# Patient Record
Sex: MALE | Race: WHITE | Employment: OTHER | URBAN - NONMETROPOLITAN AREA
[De-identification: names, ages, dates, MRNs, and addresses within clinical notes are randomized per-mention and may not be internally consistent; named-entity substitution may affect disease eponyms.]

---

## 2017-01-17 ENCOUNTER — OFFICE VISIT (OUTPATIENT)
Dept: FAMILY MEDICINE CLINIC | Age: 67
End: 2017-01-17

## 2017-01-17 VITALS
TEMPERATURE: 98 F | RESPIRATION RATE: 14 BRPM | WEIGHT: 193.2 LBS | DIASTOLIC BLOOD PRESSURE: 86 MMHG | HEART RATE: 60 BPM | BODY MASS INDEX: 28.62 KG/M2 | SYSTOLIC BLOOD PRESSURE: 150 MMHG

## 2017-01-17 DIAGNOSIS — M79.10 MYALGIA: Primary | ICD-10-CM

## 2017-01-17 DIAGNOSIS — B97.89 VIRAL SINUSITIS: ICD-10-CM

## 2017-01-17 DIAGNOSIS — D50.9 IRON DEFICIENCY ANEMIA, UNSPECIFIED IRON DEFICIENCY ANEMIA TYPE: ICD-10-CM

## 2017-01-17 DIAGNOSIS — J32.9 VIRAL SINUSITIS: ICD-10-CM

## 2017-01-17 DIAGNOSIS — M54.9 MID BACK PAIN: ICD-10-CM

## 2017-01-17 DIAGNOSIS — R53.83 OTHER FATIGUE: ICD-10-CM

## 2017-01-17 PROCEDURE — 93000 ELECTROCARDIOGRAM COMPLETE: CPT | Performed by: FAMILY MEDICINE

## 2017-01-17 PROCEDURE — G8427 DOCREV CUR MEDS BY ELIG CLIN: HCPCS | Performed by: FAMILY MEDICINE

## 2017-01-17 PROCEDURE — G8484 FLU IMMUNIZE NO ADMIN: HCPCS | Performed by: FAMILY MEDICINE

## 2017-01-17 PROCEDURE — 1036F TOBACCO NON-USER: CPT | Performed by: FAMILY MEDICINE

## 2017-01-17 PROCEDURE — G8420 CALC BMI NORM PARAMETERS: HCPCS | Performed by: FAMILY MEDICINE

## 2017-01-17 PROCEDURE — 1123F ACP DISCUSS/DSCN MKR DOCD: CPT | Performed by: FAMILY MEDICINE

## 2017-01-17 PROCEDURE — 99214 OFFICE O/P EST MOD 30 MIN: CPT | Performed by: FAMILY MEDICINE

## 2017-01-17 PROCEDURE — 4040F PNEUMOC VAC/ADMIN/RCVD: CPT | Performed by: FAMILY MEDICINE

## 2017-01-17 PROCEDURE — 3017F COLORECTAL CA SCREEN DOC REV: CPT | Performed by: FAMILY MEDICINE

## 2017-01-17 ASSESSMENT — PATIENT HEALTH QUESTIONNAIRE - PHQ9
SUM OF ALL RESPONSES TO PHQ9 QUESTIONS 1 & 2: 0
SUM OF ALL RESPONSES TO PHQ QUESTIONS 1-9: 0
1. LITTLE INTEREST OR PLEASURE IN DOING THINGS: 0
2. FEELING DOWN, DEPRESSED OR HOPELESS: 0

## 2017-01-19 ENCOUNTER — TELEPHONE (OUTPATIENT)
Dept: FAMILY MEDICINE CLINIC | Age: 67
End: 2017-01-19

## 2017-01-19 DIAGNOSIS — R19.5 HEME POSITIVE STOOL: Primary | ICD-10-CM

## 2017-01-19 DIAGNOSIS — D50.9 IRON DEFICIENCY ANEMIA, UNSPECIFIED IRON DEFICIENCY ANEMIA TYPE: ICD-10-CM

## 2017-01-19 ASSESSMENT — ENCOUNTER SYMPTOMS
ABDOMINAL PAIN: 0
NAUSEA: 0
SORE THROAT: 1
VOMITING: 0
BACK PAIN: 1
DIARRHEA: 0
RHINORRHEA: 1
SHORTNESS OF BREATH: 0
CONSTIPATION: 0
BLOOD IN STOOL: 0
STRIDOR: 0
WHEEZING: 0
COUGH: 0

## 2017-01-23 ENCOUNTER — OFFICE VISIT (OUTPATIENT)
Dept: FAMILY MEDICINE CLINIC | Age: 67
End: 2017-01-23

## 2017-01-23 VITALS
RESPIRATION RATE: 17 BRPM | BODY MASS INDEX: 28.23 KG/M2 | HEIGHT: 69 IN | OXYGEN SATURATION: 94 % | DIASTOLIC BLOOD PRESSURE: 72 MMHG | WEIGHT: 190.6 LBS | HEART RATE: 64 BPM | SYSTOLIC BLOOD PRESSURE: 129 MMHG

## 2017-01-23 DIAGNOSIS — D50.9 IRON DEFICIENCY ANEMIA, UNSPECIFIED IRON DEFICIENCY ANEMIA TYPE: Primary | ICD-10-CM

## 2017-01-23 DIAGNOSIS — R19.5 HEME + STOOL: ICD-10-CM

## 2017-01-23 DIAGNOSIS — E83.52 HYPERCALCEMIA: ICD-10-CM

## 2017-01-23 PROCEDURE — 1123F ACP DISCUSS/DSCN MKR DOCD: CPT | Performed by: FAMILY MEDICINE

## 2017-01-23 PROCEDURE — 3045F PR MOST RECENT HEMOGLOBIN A1C LEVEL 7.0-9.0%: CPT | Performed by: FAMILY MEDICINE

## 2017-01-23 PROCEDURE — 99214 OFFICE O/P EST MOD 30 MIN: CPT | Performed by: FAMILY MEDICINE

## 2017-01-23 PROCEDURE — G8484 FLU IMMUNIZE NO ADMIN: HCPCS | Performed by: FAMILY MEDICINE

## 2017-01-23 PROCEDURE — 1036F TOBACCO NON-USER: CPT | Performed by: FAMILY MEDICINE

## 2017-01-23 PROCEDURE — 3017F COLORECTAL CA SCREEN DOC REV: CPT | Performed by: FAMILY MEDICINE

## 2017-01-23 PROCEDURE — G8420 CALC BMI NORM PARAMETERS: HCPCS | Performed by: FAMILY MEDICINE

## 2017-01-23 PROCEDURE — G8427 DOCREV CUR MEDS BY ELIG CLIN: HCPCS | Performed by: FAMILY MEDICINE

## 2017-01-23 PROCEDURE — 4040F PNEUMOC VAC/ADMIN/RCVD: CPT | Performed by: FAMILY MEDICINE

## 2017-01-26 ENCOUNTER — TELEPHONE (OUTPATIENT)
Dept: FAMILY MEDICINE CLINIC | Age: 67
End: 2017-01-26

## 2017-01-26 RX ORDER — GLIMEPIRIDE 2 MG/1
2 TABLET ORAL EVERY MORNING
Qty: 30 TABLET | Refills: 3 | Status: SHIPPED | OUTPATIENT
Start: 2017-01-26 | End: 2017-01-27 | Stop reason: SDUPTHER

## 2017-01-26 ASSESSMENT — ENCOUNTER SYMPTOMS
ABDOMINAL PAIN: 0
STRIDOR: 0
SHORTNESS OF BREATH: 0
NAUSEA: 0
COUGH: 0
WHEEZING: 0
DIARRHEA: 0
CONSTIPATION: 0
BLOOD IN STOOL: 0
VOMITING: 0

## 2017-01-27 RX ORDER — GLIMEPIRIDE 2 MG/1
2 TABLET ORAL 2 TIMES DAILY
Qty: 180 TABLET | Refills: 2 | Status: SHIPPED | OUTPATIENT
Start: 2017-01-27 | End: 2017-10-31 | Stop reason: SDUPTHER

## 2017-02-02 ENCOUNTER — OFFICE VISIT (OUTPATIENT)
Dept: FAMILY MEDICINE CLINIC | Age: 67
End: 2017-02-02

## 2017-02-02 VITALS
HEART RATE: 80 BPM | SYSTOLIC BLOOD PRESSURE: 114 MMHG | OXYGEN SATURATION: 96 % | WEIGHT: 190 LBS | BODY MASS INDEX: 28.14 KG/M2 | DIASTOLIC BLOOD PRESSURE: 56 MMHG | RESPIRATION RATE: 8 BRPM | TEMPERATURE: 97.6 F

## 2017-02-02 DIAGNOSIS — J01.10 ACUTE NON-RECURRENT FRONTAL SINUSITIS: ICD-10-CM

## 2017-02-02 DIAGNOSIS — J02.0 STREP THROAT: Primary | ICD-10-CM

## 2017-02-02 LAB — S PYO AG THROAT QL: POSITIVE

## 2017-02-02 PROCEDURE — 1036F TOBACCO NON-USER: CPT | Performed by: FAMILY MEDICINE

## 2017-02-02 PROCEDURE — 1123F ACP DISCUSS/DSCN MKR DOCD: CPT | Performed by: FAMILY MEDICINE

## 2017-02-02 PROCEDURE — 4040F PNEUMOC VAC/ADMIN/RCVD: CPT | Performed by: FAMILY MEDICINE

## 2017-02-02 PROCEDURE — 87880 STREP A ASSAY W/OPTIC: CPT | Performed by: FAMILY MEDICINE

## 2017-02-02 PROCEDURE — 99213 OFFICE O/P EST LOW 20 MIN: CPT | Performed by: FAMILY MEDICINE

## 2017-02-02 PROCEDURE — G8484 FLU IMMUNIZE NO ADMIN: HCPCS | Performed by: FAMILY MEDICINE

## 2017-02-02 PROCEDURE — 3017F COLORECTAL CA SCREEN DOC REV: CPT | Performed by: FAMILY MEDICINE

## 2017-02-02 PROCEDURE — G8427 DOCREV CUR MEDS BY ELIG CLIN: HCPCS | Performed by: FAMILY MEDICINE

## 2017-02-02 PROCEDURE — G8420 CALC BMI NORM PARAMETERS: HCPCS | Performed by: FAMILY MEDICINE

## 2017-02-02 RX ORDER — AMOXICILLIN AND CLAVULANATE POTASSIUM 875; 125 MG/1; MG/1
1 TABLET, FILM COATED ORAL 2 TIMES DAILY
Qty: 20 TABLET | Refills: 0 | Status: SHIPPED | OUTPATIENT
Start: 2017-02-02 | End: 2017-02-12

## 2017-02-02 ASSESSMENT — ENCOUNTER SYMPTOMS
NAUSEA: 0
SORE THROAT: 1
DIARRHEA: 0
STRIDOR: 0
BLOOD IN STOOL: 0
VOMITING: 0
ABDOMINAL PAIN: 0
SHORTNESS OF BREATH: 0
WHEEZING: 0
RHINORRHEA: 1
SINUS PRESSURE: 1
CONSTIPATION: 0
COUGH: 1

## 2017-02-23 ENCOUNTER — TELEPHONE (OUTPATIENT)
Dept: FAMILY MEDICINE CLINIC | Age: 67
End: 2017-02-23

## 2017-04-03 ENCOUNTER — OFFICE VISIT (OUTPATIENT)
Dept: ENDOCRINOLOGY | Age: 67
End: 2017-04-03

## 2017-04-03 VITALS
RESPIRATION RATE: 16 BRPM | WEIGHT: 194 LBS | SYSTOLIC BLOOD PRESSURE: 136 MMHG | HEART RATE: 67 BPM | BODY MASS INDEX: 28.73 KG/M2 | DIASTOLIC BLOOD PRESSURE: 76 MMHG | HEIGHT: 69 IN

## 2017-04-03 DIAGNOSIS — I10 ESSENTIAL HYPERTENSION: ICD-10-CM

## 2017-04-03 DIAGNOSIS — E83.52 HYPERCALCEMIA: ICD-10-CM

## 2017-04-03 DIAGNOSIS — E78.00 PURE HYPERCHOLESTEROLEMIA: ICD-10-CM

## 2017-04-03 PROCEDURE — 1123F ACP DISCUSS/DSCN MKR DOCD: CPT | Performed by: INTERNAL MEDICINE

## 2017-04-03 PROCEDURE — 4040F PNEUMOC VAC/ADMIN/RCVD: CPT | Performed by: INTERNAL MEDICINE

## 2017-04-03 PROCEDURE — G8420 CALC BMI NORM PARAMETERS: HCPCS | Performed by: INTERNAL MEDICINE

## 2017-04-03 PROCEDURE — 3017F COLORECTAL CA SCREEN DOC REV: CPT | Performed by: INTERNAL MEDICINE

## 2017-04-03 PROCEDURE — 3045F PR MOST RECENT HEMOGLOBIN A1C LEVEL 7.0-9.0%: CPT | Performed by: INTERNAL MEDICINE

## 2017-04-03 PROCEDURE — G8427 DOCREV CUR MEDS BY ELIG CLIN: HCPCS | Performed by: INTERNAL MEDICINE

## 2017-04-03 PROCEDURE — 1036F TOBACCO NON-USER: CPT | Performed by: INTERNAL MEDICINE

## 2017-04-03 PROCEDURE — 99214 OFFICE O/P EST MOD 30 MIN: CPT | Performed by: INTERNAL MEDICINE

## 2017-04-03 RX ORDER — CARVEDILOL 12.5 MG/1
12.5 TABLET ORAL 2 TIMES DAILY
Qty: 180 TABLET | Refills: 2 | Status: SHIPPED | OUTPATIENT
Start: 2017-04-03 | End: 2017-12-04

## 2017-04-03 RX ORDER — RANITIDINE 150 MG/1
150 CAPSULE ORAL EVERY EVENING
COMMUNITY
End: 2019-10-31 | Stop reason: SDUPTHER

## 2017-04-24 ENCOUNTER — OFFICE VISIT (OUTPATIENT)
Dept: FAMILY MEDICINE CLINIC | Age: 67
End: 2017-04-24

## 2017-04-24 VITALS
DIASTOLIC BLOOD PRESSURE: 58 MMHG | SYSTOLIC BLOOD PRESSURE: 122 MMHG | HEIGHT: 69 IN | RESPIRATION RATE: 16 BRPM | WEIGHT: 196.8 LBS | HEART RATE: 64 BPM | BODY MASS INDEX: 29.15 KG/M2

## 2017-04-24 DIAGNOSIS — Z99.89 OBSTRUCTIVE SLEEP APNEA ON CPAP: ICD-10-CM

## 2017-04-24 DIAGNOSIS — I10 ESSENTIAL HYPERTENSION: ICD-10-CM

## 2017-04-24 DIAGNOSIS — D64.9 NORMOCYTIC ANEMIA: Primary | ICD-10-CM

## 2017-04-24 DIAGNOSIS — E78.00 PURE HYPERCHOLESTEROLEMIA: ICD-10-CM

## 2017-04-24 DIAGNOSIS — L82.1 SEBORRHEIC KERATOSES: ICD-10-CM

## 2017-04-24 DIAGNOSIS — R60.0 BILATERAL LEG EDEMA: ICD-10-CM

## 2017-04-24 DIAGNOSIS — G47.33 OBSTRUCTIVE SLEEP APNEA ON CPAP: ICD-10-CM

## 2017-04-24 PROCEDURE — 1036F TOBACCO NON-USER: CPT | Performed by: FAMILY MEDICINE

## 2017-04-24 PROCEDURE — 3017F COLORECTAL CA SCREEN DOC REV: CPT | Performed by: FAMILY MEDICINE

## 2017-04-24 PROCEDURE — 4040F PNEUMOC VAC/ADMIN/RCVD: CPT | Performed by: FAMILY MEDICINE

## 2017-04-24 PROCEDURE — G8420 CALC BMI NORM PARAMETERS: HCPCS | Performed by: FAMILY MEDICINE

## 2017-04-24 PROCEDURE — 1123F ACP DISCUSS/DSCN MKR DOCD: CPT | Performed by: FAMILY MEDICINE

## 2017-04-24 PROCEDURE — G8427 DOCREV CUR MEDS BY ELIG CLIN: HCPCS | Performed by: FAMILY MEDICINE

## 2017-04-24 PROCEDURE — 99214 OFFICE O/P EST MOD 30 MIN: CPT | Performed by: FAMILY MEDICINE

## 2017-04-24 ASSESSMENT — ENCOUNTER SYMPTOMS
ABDOMINAL PAIN: 0
STRIDOR: 0
BLOOD IN STOOL: 0
WHEEZING: 0
COUGH: 0
DIARRHEA: 0
SHORTNESS OF BREATH: 0
VOMITING: 0
CONSTIPATION: 0
NAUSEA: 0

## 2017-08-14 ENCOUNTER — HOSPITAL ENCOUNTER (OUTPATIENT)
Age: 67
Discharge: HOME | End: 2017-08-14
Payer: MEDICARE

## 2017-08-14 ENCOUNTER — OFFICE VISIT (OUTPATIENT)
Dept: ENDOCRINOLOGY | Age: 67
End: 2017-08-14
Payer: MEDICARE

## 2017-08-14 VITALS
DIASTOLIC BLOOD PRESSURE: 72 MMHG | RESPIRATION RATE: 16 BRPM | HEART RATE: 63 BPM | SYSTOLIC BLOOD PRESSURE: 138 MMHG | HEIGHT: 69 IN | WEIGHT: 194.5 LBS | BODY MASS INDEX: 28.81 KG/M2

## 2017-08-14 DIAGNOSIS — E21.3 HYPERPARATHYROIDISM (HCC): ICD-10-CM

## 2017-08-14 DIAGNOSIS — I10 ESSENTIAL HYPERTENSION: ICD-10-CM

## 2017-08-14 DIAGNOSIS — E78.00 PURE HYPERCHOLESTEROLEMIA: ICD-10-CM

## 2017-08-14 LAB
ALBUMIN SERPL-MCNC: 4.2 G/DL (ref 3.5–5.1)
ALP BLD-CCNC: 67 U/L (ref 38–126)
ALT SERPL-CCNC: 18 U/L (ref 11–66)
ANION GAP SERPL CALCULATED.3IONS-SCNC: 11 MEQ/L (ref 8–16)
AST SERPL-CCNC: 14 U/L (ref 5–40)
AVERAGE GLUCOSE: 153 MG/DL (ref 70–126)
BILIRUB SERPL-MCNC: 0.4 MG/DL (ref 0.3–1.2)
BUN BLDV-MCNC: 12 MG/DL (ref 7–22)
CALCIUM SERPL-MCNC: 10.9 MG/DL (ref 8.5–10.5)
CHLORIDE BLD-SCNC: 102 MEQ/L (ref 98–111)
CO2: 29 MEQ/L (ref 23–33)
CREAT SERPL-MCNC: 0.8 MG/DL (ref 0.4–1.2)
GFR SERPL CREATININE-BSD FRML MDRD: > 90 ML/MIN/1.73M2
GLUCOSE BLD-MCNC: 98 MG/DL (ref 70–108)
HBA1C MFR BLD: 7.1 % (ref 4.4–6.4)
MAGNESIUM: 1.3 MG/DL (ref 1.6–2.4)
POTASSIUM SERPL-SCNC: 4.3 MEQ/L (ref 3.5–5.2)
PTH INTACT: 55.8 PG/ML (ref 15–65)
SODIUM BLD-SCNC: 142 MEQ/L (ref 135–145)
TOTAL PROTEIN: 6.8 G/DL (ref 6.1–8)
VITAMIN D 25-HYDROXY: 20 NG/ML (ref 30–100)

## 2017-08-14 PROCEDURE — 83735 ASSAY OF MAGNESIUM: CPT

## 2017-08-14 PROCEDURE — 3017F COLORECTAL CA SCREEN DOC REV: CPT | Performed by: INTERNAL MEDICINE

## 2017-08-14 PROCEDURE — 83970 ASSAY OF PARATHORMONE: CPT

## 2017-08-14 PROCEDURE — 36415 COLL VENOUS BLD VENIPUNCTURE: CPT

## 2017-08-14 PROCEDURE — 82306 VITAMIN D 25 HYDROXY: CPT

## 2017-08-14 PROCEDURE — 83036 HEMOGLOBIN GLYCOSYLATED A1C: CPT

## 2017-08-14 PROCEDURE — 99214 OFFICE O/P EST MOD 30 MIN: CPT | Performed by: INTERNAL MEDICINE

## 2017-08-14 PROCEDURE — 1036F TOBACCO NON-USER: CPT | Performed by: INTERNAL MEDICINE

## 2017-08-14 PROCEDURE — 1123F ACP DISCUSS/DSCN MKR DOCD: CPT | Performed by: INTERNAL MEDICINE

## 2017-08-14 PROCEDURE — 3046F HEMOGLOBIN A1C LEVEL >9.0%: CPT | Performed by: INTERNAL MEDICINE

## 2017-08-14 PROCEDURE — G8419 CALC BMI OUT NRM PARAM NOF/U: HCPCS | Performed by: INTERNAL MEDICINE

## 2017-08-14 PROCEDURE — 80053 COMPREHEN METABOLIC PANEL: CPT

## 2017-08-14 PROCEDURE — 82330 ASSAY OF CALCIUM: CPT

## 2017-08-14 PROCEDURE — 4040F PNEUMOC VAC/ADMIN/RCVD: CPT | Performed by: INTERNAL MEDICINE

## 2017-08-14 PROCEDURE — G8427 DOCREV CUR MEDS BY ELIG CLIN: HCPCS | Performed by: INTERNAL MEDICINE

## 2017-08-14 PROCEDURE — 82652 VIT D 1 25-DIHYDROXY: CPT

## 2017-08-14 RX ORDER — EZETIMIBE 10 MG/1
10 TABLET ORAL DAILY
Qty: 30 TABLET | Refills: 3 | Status: SHIPPED | OUTPATIENT
Start: 2017-08-14 | End: 2018-01-25 | Stop reason: ALTCHOICE

## 2017-08-16 LAB
CALCIUM IONIZED: NORMAL
VITAMIN D 1,25-DIHYDROXY: 38.9 PG/ML (ref 19.9–79.3)

## 2017-08-24 DIAGNOSIS — N52.9 ERECTILE DYSFUNCTION, UNSPECIFIED ERECTILE DYSFUNCTION TYPE: ICD-10-CM

## 2017-08-24 RX ORDER — TADALAFIL 5 MG/1
5 TABLET ORAL PRN
Qty: 90 TABLET | Refills: 3 | Status: SHIPPED | OUTPATIENT
Start: 2017-08-24 | End: 2017-08-28 | Stop reason: SDUPTHER

## 2017-08-28 ENCOUNTER — TELEPHONE (OUTPATIENT)
Dept: FAMILY MEDICINE CLINIC | Age: 67
End: 2017-08-28

## 2017-08-28 DIAGNOSIS — N52.9 ERECTILE DYSFUNCTION, UNSPECIFIED ERECTILE DYSFUNCTION TYPE: ICD-10-CM

## 2017-08-28 RX ORDER — TADALAFIL 5 MG/1
5 TABLET ORAL PRN
Qty: 20 TABLET | Refills: 11 | Status: SHIPPED | OUTPATIENT
Start: 2017-08-28 | End: 2017-08-28 | Stop reason: SDUPTHER

## 2017-08-28 RX ORDER — TADALAFIL 5 MG/1
5 TABLET ORAL PRN
Qty: 60 TABLET | Refills: 3 | Status: SHIPPED | OUTPATIENT
Start: 2017-08-28 | End: 2017-12-04 | Stop reason: ALTCHOICE

## 2017-09-15 ENCOUNTER — HOSPITAL ENCOUNTER (OUTPATIENT)
Age: 67
Discharge: HOME | End: 2017-09-15
Payer: MEDICARE

## 2017-09-15 LAB
ALBUMIN SERPL-MCNC: 4 G/DL (ref 3.5–5.1)
ALP BLD-CCNC: 61 U/L (ref 38–126)
ALT SERPL-CCNC: 23 U/L (ref 11–66)
AST SERPL-CCNC: 16 U/L (ref 5–40)
BILIRUB SERPL-MCNC: 0.3 MG/DL (ref 0.3–1.2)
BILIRUBIN DIRECT: < 0.2 MG/DL (ref 0–0.3)
CHOLESTEROL, TOTAL: 142 MG/DL (ref 100–199)
HDLC SERPL-MCNC: 31 MG/DL
LDL CHOLESTEROL CALCULATED: 81 MG/DL
TOTAL PROTEIN: 6.4 G/DL (ref 6.1–8)
TRIGL SERPL-MCNC: 151 MG/DL (ref 0–199)

## 2017-09-15 PROCEDURE — 36415 COLL VENOUS BLD VENIPUNCTURE: CPT

## 2017-09-15 PROCEDURE — 80076 HEPATIC FUNCTION PANEL: CPT

## 2017-09-15 PROCEDURE — 80061 LIPID PANEL: CPT

## 2017-09-26 ENCOUNTER — OFFICE VISIT (OUTPATIENT)
Dept: FAMILY MEDICINE CLINIC | Age: 67
End: 2017-09-26
Payer: MEDICARE

## 2017-09-26 VITALS
TEMPERATURE: 97.6 F | HEART RATE: 86 BPM | OXYGEN SATURATION: 98 % | WEIGHT: 197.6 LBS | RESPIRATION RATE: 16 BRPM | BODY MASS INDEX: 29.27 KG/M2 | DIASTOLIC BLOOD PRESSURE: 70 MMHG | SYSTOLIC BLOOD PRESSURE: 124 MMHG

## 2017-09-26 DIAGNOSIS — I10 ESSENTIAL HYPERTENSION: ICD-10-CM

## 2017-09-26 DIAGNOSIS — H65.01 RIGHT ACUTE SEROUS OTITIS MEDIA, RECURRENCE NOT SPECIFIED: ICD-10-CM
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
## 2017-09-26 DIAGNOSIS — L57.0 ACTINIC KERATOSIS OF LEFT TEMPLE: ICD-10-CM

## 2017-09-26 DIAGNOSIS — H93.8X1 EAR FULLNESS, RIGHT: Primary | ICD-10-CM

## 2017-09-26 PROCEDURE — 99214 OFFICE O/P EST MOD 30 MIN: CPT | Performed by: FAMILY MEDICINE

## 2017-09-26 PROCEDURE — 4040F PNEUMOC VAC/ADMIN/RCVD: CPT | Performed by: FAMILY MEDICINE

## 2017-09-26 PROCEDURE — G8427 DOCREV CUR MEDS BY ELIG CLIN: HCPCS | Performed by: FAMILY MEDICINE

## 2017-09-26 PROCEDURE — G8417 CALC BMI ABV UP PARAM F/U: HCPCS | Performed by: FAMILY MEDICINE

## 2017-09-26 PROCEDURE — 1036F TOBACCO NON-USER: CPT | Performed by: FAMILY MEDICINE

## 2017-09-26 PROCEDURE — 1123F ACP DISCUSS/DSCN MKR DOCD: CPT | Performed by: FAMILY MEDICINE

## 2017-09-26 PROCEDURE — 3017F COLORECTAL CA SCREEN DOC REV: CPT | Performed by: FAMILY MEDICINE

## 2017-09-26 RX ORDER — AMOXICILLIN 875 MG/1
875 TABLET, COATED ORAL 2 TIMES DAILY
Qty: 20 TABLET | Refills: 0 | Status: SHIPPED | OUTPATIENT
Start: 2017-09-26 | End: 2017-10-06

## 2017-09-26 RX ORDER — CARVEDILOL 25 MG/1
25 TABLET ORAL 2 TIMES DAILY
Qty: 60 TABLET | Refills: 3 | Status: CANCELLED | OUTPATIENT
Start: 2017-09-26

## 2017-09-26 ASSESSMENT — ENCOUNTER SYMPTOMS
BLOOD IN STOOL: 0
WHEEZING: 0
COUGH: 0
ABDOMINAL PAIN: 0
SHORTNESS OF BREATH: 0
VOMITING: 0
CONSTIPATION: 0
NAUSEA: 0
STRIDOR: 0
SORE THROAT: 0
DIARRHEA: 0
SINUS PRESSURE: 0

## 2017-09-27 ENCOUNTER — TELEPHONE (OUTPATIENT)
Dept: ENDOCRINOLOGY | Age: 67
End: 2017-09-27

## 2017-09-27 DIAGNOSIS — E21.3 HYPERPARATHYROIDISM (HCC): Primary | ICD-10-CM

## 2017-09-27 DIAGNOSIS — E83.52 HYPERCALCEMIA: ICD-10-CM

## 2017-10-31 RX ORDER — GLIMEPIRIDE 2 MG/1
TABLET ORAL
Qty: 180 TABLET | Refills: 2 | Status: SHIPPED | OUTPATIENT
Start: 2017-10-31 | End: 2018-05-21

## 2017-11-27 ENCOUNTER — OFFICE VISIT (OUTPATIENT)
Dept: FAMILY MEDICINE CLINIC | Age: 67
End: 2017-11-27
Payer: MEDICARE

## 2017-11-27 VITALS
DIASTOLIC BLOOD PRESSURE: 84 MMHG | RESPIRATION RATE: 8 BRPM | BODY MASS INDEX: 28.97 KG/M2 | WEIGHT: 195.6 LBS | HEART RATE: 84 BPM | SYSTOLIC BLOOD PRESSURE: 148 MMHG

## 2017-11-27 DIAGNOSIS — N52.1 ERECTILE DYSFUNCTION DUE TO DISEASES CLASSIFIED ELSEWHERE: ICD-10-CM

## 2017-11-27 DIAGNOSIS — I10 ESSENTIAL HYPERTENSION: ICD-10-CM

## 2017-11-27 DIAGNOSIS — Z13.6 SCREENING FOR AAA (ABDOMINAL AORTIC ANEURYSM): ICD-10-CM

## 2017-11-27 DIAGNOSIS — E78.00 PURE HYPERCHOLESTEROLEMIA: ICD-10-CM

## 2017-11-27 LAB
HBA1C MFR BLD: 7.2 % (ref 4.3–5.7)
MICROALB/CREAT RATIO POC: < 30 MG/G
MICROALBUMIN/CREAT UR-RTO: 10 MG/L
POC CREATININE: 50 MG/DL

## 2017-11-27 PROCEDURE — G8427 DOCREV CUR MEDS BY ELIG CLIN: HCPCS | Performed by: FAMILY MEDICINE

## 2017-11-27 PROCEDURE — 1036F TOBACCO NON-USER: CPT | Performed by: FAMILY MEDICINE

## 2017-11-27 PROCEDURE — 3045F PR MOST RECENT HEMOGLOBIN A1C LEVEL 7.0-9.0%: CPT | Performed by: FAMILY MEDICINE

## 2017-11-27 PROCEDURE — 83036 HEMOGLOBIN GLYCOSYLATED A1C: CPT | Performed by: FAMILY MEDICINE

## 2017-11-27 PROCEDURE — 4040F PNEUMOC VAC/ADMIN/RCVD: CPT | Performed by: FAMILY MEDICINE

## 2017-11-27 PROCEDURE — 3017F COLORECTAL CA SCREEN DOC REV: CPT | Performed by: FAMILY MEDICINE

## 2017-11-27 PROCEDURE — G8417 CALC BMI ABV UP PARAM F/U: HCPCS | Performed by: FAMILY MEDICINE

## 2017-11-27 PROCEDURE — 1123F ACP DISCUSS/DSCN MKR DOCD: CPT | Performed by: FAMILY MEDICINE

## 2017-11-27 PROCEDURE — 99214 OFFICE O/P EST MOD 30 MIN: CPT | Performed by: FAMILY MEDICINE

## 2017-11-27 PROCEDURE — 82044 UR ALBUMIN SEMIQUANTITATIVE: CPT | Performed by: FAMILY MEDICINE

## 2017-11-27 PROCEDURE — G8484 FLU IMMUNIZE NO ADMIN: HCPCS | Performed by: FAMILY MEDICINE

## 2017-11-27 RX ORDER — PRAVASTATIN SODIUM 20 MG
20 TABLET ORAL EVERY EVENING
Qty: 30 TABLET | Refills: 5 | Status: SHIPPED | OUTPATIENT
Start: 2017-11-27 | End: 2018-05-27 | Stop reason: SDUPTHER

## 2017-11-27 RX ORDER — SILDENAFIL CITRATE 20 MG/1
TABLET ORAL
Qty: 60 TABLET | Refills: 3 | Status: SHIPPED | OUTPATIENT
Start: 2017-11-27 | End: 2017-11-27

## 2017-11-27 RX ORDER — GLIMEPIRIDE 1 MG/1
1 TABLET ORAL EVERY EVENING
Qty: 90 TABLET | Refills: 3 | Status: SHIPPED | OUTPATIENT
Start: 2017-11-27 | End: 2018-05-21

## 2017-11-27 NOTE — PROGRESS NOTES
Nichole Hurtado is a 78 y.o. male who presents today for:   Chief Complaint   Patient presents with    Follow-up     HTN, DM, anemia         HPI:     HPI     HTN- no cp/sob. Tolerating meds well. occ checks usually 120-130s/70s. DMII-  fastings- more around 160s more recently. No log brought  Before supper-<110, usually 80-90s. Snack at night is usually nuts. Lab Results   Component Value Date    LABA1C 7.2 (H) 11/27/2017     No results found for: EAG      Hypercalcemia- endo referred to [facility]. Hasn't heard about appt yet. Trouble with erections not being hard enough. cialis helped in past but too expensive. HLD- Zetia- expensive. Crestor and lipitor caused muscle aches. H/o smoking in past      Patient's medications, allergies, past medical, surgical, social, and family histories were reviewed and updated as appropriate. Outpatient Medications Prior to Visit   Medication Sig Dispense Refill    glimepiride (AMARYL) 2 MG tablet TAKE ONE TABLET BY MOUTH TWICE DAILY 180 tablet 2    metFORMIN (GLUCOPHAGE) 1000 MG tablet TAKE ONE TABLET BY MOUTH TWICE DAILY WITH MEALS 180 tablet 3    tadalafil (CIALIS) 5 MG tablet Take 1 tablet by mouth as needed for Erectile Dysfunction 60 tablet 3    ezetimibe (ZETIA) 10 MG tablet Take 1 tablet by mouth daily 30 tablet 3    irbesartan (AVAPRO) 300 MG tablet TAKE ONE TABLET BY MOUTH ONCE DAILY 90 tablet 3    ranitidine (ZANTAC) 150 MG capsule Take 150 mg by mouth every evening      carvedilol (COREG) 12.5 MG tablet Take 1 tablet by mouth 2 times daily 180 tablet 2    SITagliptin (JANUVIA) 100 MG tablet Take 1 tablet by mouth daily 90 tablet 3    ONE TOUCH ULTRA TEST STRP       Cinnamon 500 MG TABS Take 1 capsule by mouth daily      aspirin 81 MG tablet Take 81 mg by mouth daily      cyanocobalamin ([employer] VITAMIN B12) 1000 MCG tablet Take 1 tablet by mouth daily 30 tablet 3     No facility-administered medications prior to visit. Subjective:      Review of Systems   Constitutional: Negative for activity change, appetite change, chills, diaphoresis, fatigue, fever and unexpected weight change. Respiratory: Negative for cough, shortness of breath, wheezing and stridor. Cardiovascular: Negative for chest pain, palpitations and leg swelling. Gastrointestinal: Negative for abdominal pain, blood in stool, constipation, diarrhea, nausea and vomiting. Genitourinary: Negative for dysuria, penile swelling, scrotal swelling and testicular pain. Neurological: Negative for headaches. Objective:     Vitals:    11/27/17 1306 11/27/17 1319   BP: (!) 142/82 (!) 148/84   Site: Left Arm Left Arm   Position: Sitting Sitting   Cuff Size: Large Adult Large Adult   Pulse: 64 84   Resp: 8    Weight: 195 lb 9.6 oz (88.7 kg)      Wt Readings from Last 3 Encounters:   11/27/17 195 lb 9.6 oz (88.7 kg)   09/26/17 197 lb 9.6 oz (89.6 kg)   08/14/17 194 lb 8 oz (88.2 kg)     Physical Exam   Constitutional: He is oriented to person, place, and time. He appears well-developed and well-nourished. No distress. HENT:   Head: Normocephalic and atraumatic. Neck: Neck supple. No thyromegaly present. Cardiovascular: Normal rate, regular rhythm and normal heart sounds. Exam reveals no gallop and no friction rub. No murmur heard. Pulmonary/Chest: Effort normal and breath sounds normal. No stridor. No respiratory distress. He has no wheezes. He has no rales. He exhibits no tenderness. Lymphadenopathy:     He has no cervical adenopathy. Neurological: He is alert and oriented to person, place, and time. Skin: He is not diaphoretic. Psychiatric: He has a normal mood and affect. His behavior is normal. Judgment and thought content normal.   Nursing note and vitals reviewed. Assessment/Plan:     1.  Uncontrolled type 2 diabetes mellitus without complication, without long-term current use of insulin (HCC)  POCT microalbumin    POCT glycosylated hemoglobin (Hb A1C)    glimepiride (AMARYL) 1 MG tablet    Lipid Panel    Comprehensive Metabolic Panel    CBC With Auto Differential    Hemoglobin A1C   2. Essential hypertension  Comprehensive Metabolic Panel    CBC With Auto Differential   3. Erectile dysfunction due to diseases classified elsewhere  DISCONTINUED: sildenafil (REVATIO) 20 MG tablet   4. Pure hypercholesterolemia  pravastatin (PRAVACHOL) 20 MG tablet   5. Screening for AAA (abdominal aortic aneurysm)  US ABDOMINAL AORTA LIMITED     Discussed options. Pt concerned about cost.  Increase amaryl a bit- cont 2mg in morning and increase evening to 3mg since fastings high. Advised to watch for lows, ramesh during evening    Revatio also too expensive for ED. Pt will call back if wants to try anything else. HLD- will try pravastatin. Start low dose to see if tolerates and if so, increase. Discussed another alternate to try is simvastatin and livalo. Declined BP med change now as feels is due to cataract steroid drops despite advising likely isn't d/t this. Not wanting to increase meds now. RTC for nurse visit in 2-3 wks for BP recheck. Advised if BP still up or if is still up when sees endo, should increase BP meds. Patient OK with this plan. Discussed use, benefit, and side effects of prescribed medications. All patient questions answered. Pt voiced understanding. Reviewed health maintenance. Discussed medications, diet and exercise. Patient agreed with treatment plan. Follow up as directed.      Return in about 3 months (around 2/27/2018) for DMII, HTN.      (Please note that portions of this note were completed with a voice recognition program. Efforts were made to edit the dictations but occasionally words are mis-transcribed.)

## 2017-11-30 ASSESSMENT — ENCOUNTER SYMPTOMS
WHEEZING: 0
ABDOMINAL PAIN: 0
COUGH: 0
BLOOD IN STOOL: 0
VOMITING: 0
CONSTIPATION: 0
DIARRHEA: 0
SHORTNESS OF BREATH: 0
STRIDOR: 0
NAUSEA: 0

## 2017-12-04 ENCOUNTER — TELEPHONE (OUTPATIENT)
Dept: FAMILY MEDICINE CLINIC | Age: 67
End: 2017-12-04

## 2017-12-04 ENCOUNTER — TELEPHONE (OUTPATIENT)
Dept: ENDOCRINOLOGY | Age: 67
End: 2017-12-04

## 2017-12-04 ENCOUNTER — OFFICE VISIT (OUTPATIENT)
Dept: ENDOCRINOLOGY | Age: 67
End: 2017-12-04
Payer: MEDICARE

## 2017-12-04 ENCOUNTER — HOSPITAL ENCOUNTER (OUTPATIENT)
Age: 67
Discharge: HOME | End: 2017-12-04
Payer: MEDICARE

## 2017-12-04 VITALS
RESPIRATION RATE: 16 BRPM | BODY MASS INDEX: 28.66 KG/M2 | HEART RATE: 66 BPM | WEIGHT: 193.5 LBS | SYSTOLIC BLOOD PRESSURE: 154 MMHG | DIASTOLIC BLOOD PRESSURE: 88 MMHG | HEIGHT: 69 IN

## 2017-12-04 DIAGNOSIS — I10 ESSENTIAL HYPERTENSION: ICD-10-CM

## 2017-12-04 DIAGNOSIS — E21.3 HYPERPARATHYROIDISM (HCC): ICD-10-CM

## 2017-12-04 DIAGNOSIS — E78.00 PURE HYPERCHOLESTEROLEMIA: ICD-10-CM

## 2017-12-04 DIAGNOSIS — I10 ESSENTIAL HYPERTENSION: Primary | ICD-10-CM

## 2017-12-04 LAB
ALBUMIN SERPL-MCNC: 4.2 G/DL (ref 3.5–5.1)
ALP BLD-CCNC: 60 U/L (ref 38–126)
ALT SERPL-CCNC: 17 U/L (ref 11–66)
ANION GAP SERPL CALCULATED.3IONS-SCNC: 12 MEQ/L (ref 8–16)
AST SERPL-CCNC: 15 U/L (ref 5–40)
BILIRUB SERPL-MCNC: 0.6 MG/DL (ref 0.3–1.2)
BUN BLDV-MCNC: 14 MG/DL (ref 7–22)
CALCIUM SERPL-MCNC: 10.8 MG/DL (ref 8.5–10.5)
CHLORIDE BLD-SCNC: 101 MEQ/L (ref 98–111)
CO2: 31 MEQ/L (ref 23–33)
CREAT SERPL-MCNC: 0.9 MG/DL (ref 0.4–1.2)
GFR SERPL CREATININE-BSD FRML MDRD: 84 ML/MIN/1.73M2
GLUCOSE BLD-MCNC: 246 MG/DL (ref 70–108)
MAGNESIUM: 1.3 MG/DL (ref 1.6–2.4)
POTASSIUM SERPL-SCNC: 4.8 MEQ/L (ref 3.5–5.2)
PTH INTACT: 67.3 PG/ML (ref 15–65)
SODIUM BLD-SCNC: 144 MEQ/L (ref 135–145)
TOTAL PROTEIN: 6.8 G/DL (ref 6.1–8)
VITAMIN D 25-HYDROXY: 14 NG/ML (ref 30–100)

## 2017-12-04 PROCEDURE — 4040F PNEUMOC VAC/ADMIN/RCVD: CPT | Performed by: INTERNAL MEDICINE

## 2017-12-04 PROCEDURE — 36415 COLL VENOUS BLD VENIPUNCTURE: CPT

## 2017-12-04 PROCEDURE — 82330 ASSAY OF CALCIUM: CPT

## 2017-12-04 PROCEDURE — 82652 VIT D 1 25-DIHYDROXY: CPT

## 2017-12-04 PROCEDURE — 1036F TOBACCO NON-USER: CPT | Performed by: INTERNAL MEDICINE

## 2017-12-04 PROCEDURE — 80053 COMPREHEN METABOLIC PANEL: CPT

## 2017-12-04 PROCEDURE — 3045F PR MOST RECENT HEMOGLOBIN A1C LEVEL 7.0-9.0%: CPT | Performed by: INTERNAL MEDICINE

## 2017-12-04 PROCEDURE — 82306 VITAMIN D 25 HYDROXY: CPT

## 2017-12-04 PROCEDURE — 1123F ACP DISCUSS/DSCN MKR DOCD: CPT | Performed by: INTERNAL MEDICINE

## 2017-12-04 PROCEDURE — 3017F COLORECTAL CA SCREEN DOC REV: CPT | Performed by: INTERNAL MEDICINE

## 2017-12-04 PROCEDURE — G8427 DOCREV CUR MEDS BY ELIG CLIN: HCPCS | Performed by: INTERNAL MEDICINE

## 2017-12-04 PROCEDURE — 99214 OFFICE O/P EST MOD 30 MIN: CPT | Performed by: INTERNAL MEDICINE

## 2017-12-04 PROCEDURE — G8417 CALC BMI ABV UP PARAM F/U: HCPCS | Performed by: INTERNAL MEDICINE

## 2017-12-04 PROCEDURE — G8484 FLU IMMUNIZE NO ADMIN: HCPCS | Performed by: INTERNAL MEDICINE

## 2017-12-04 PROCEDURE — 83970 ASSAY OF PARATHORMONE: CPT

## 2017-12-04 PROCEDURE — 83735 ASSAY OF MAGNESIUM: CPT

## 2017-12-04 RX ORDER — CARVEDILOL 25 MG/1
25 TABLET ORAL 2 TIMES DAILY
Qty: 60 TABLET | Refills: 3 | Status: SHIPPED | OUTPATIENT
Start: 2017-12-04 | End: 2018-04-10 | Stop reason: SDUPTHER

## 2017-12-04 ASSESSMENT — ENCOUNTER SYMPTOMS: BLURRED VISION: 0

## 2017-12-04 NOTE — PROGRESS NOTES
a day.  His blood pressure was elevated today but he denies headaches, chest pain and shortness of breath.   Past Medical History:   Diagnosis Date    Erectile dysfunction     Hyperlipidemia     Hypertension     LEW treated with BiPAP     Type 2 diabetes mellitus ([facility] [address] 2000      Past Surgical History:   Procedure Laterality Date    APPENDECTOMY  as teenager    BACK SURGERY  1988    herniated disc- L5-S1    CATARACT REMOVAL Bilateral     COLONOSCOPY  07/2016    ENDOSCOPIC ULTRASOUND (UPPER)  02/22/2017    EYE SURGERY Right 2005    TONSILLECTOMY      as teenager    UPPER GASTROINTESTINAL ENDOSCOPY         Family History   Problem Relation Age of Onset    Diabetes Mother     Cancer Mother      lung    Diabetes Father     Kidney Disease Father     Heart Attack Paternal Grandfather 72     Social History   Substance Use Topics    Smoking status: Former Smoker     Packs/day: 1.25     Years: 20.00     Quit date: 1/1/1995    Smokeless tobacco: Former User     Types: Chew     Quit date: 1/1/1990    Alcohol use No      Current Outpatient Prescriptions   Medication Sig Dispense Refill    SITagliptin (JANUVIA) 100 MG tablet Take 1 tablet by mouth daily 90 tablet 3    glimepiride (AMARYL) 1 MG tablet Take 1 tablet by mouth every evening Take along with 2mg tablet at supper for total of 3mg (Patient taking differently: Take 1 mg by mouth every evening Take along with 2mg tablet at breakfast and 3mg in the evening) 90 tablet 3    pravastatin (PRAVACHOL) 20 MG tablet Take 1 tablet by mouth every evening For cholesterol 30 tablet 5    glimepiride (AMARYL) 2 MG tablet TAKE ONE TABLET BY MOUTH TWICE DAILY 180 tablet 2    metFORMIN (GLUCOPHAGE) 1000 MG tablet TAKE ONE TABLET BY MOUTH TWICE DAILY WITH MEALS 180 tablet 3    ezetimibe (ZETIA) 10 MG tablet Take 1 tablet by mouth daily 30 tablet 3    irbesartan (AVAPRO) 300 MG tablet TAKE ONE TABLET BY MOUTH ONCE DAILY 90 tablet 3    ranitidine (ZANTAC) 150 MG capsule Take 150 mg by mouth every evening      ONE TOUCH ULTRA TEST STRP       Cinnamon 500 MG TABS Take 1 capsule by mouth daily      aspirin 81 MG tablet Take 81 mg by mouth daily      carvedilol (COREG) 25 MG tablet Take 1 tablet by mouth 2 times daily 60 tablet 3     No current facility-administered medications for this visit.       Allergies   Allergen Reactions    Crestor [Rosuvastatin]      Muscle aches    Lipitor [Atorvastatin]      Back ache     Health Maintenance   Topic Date Due    DTaP/Tdap/Td vaccine (1 - Tdap) 03/16/1970    Zostavax vaccine  03/16/2011    Pneumococcal low/med risk (2 of 2 - PPSV23) 06/21/2017    Diabetic foot exam  08/14/2018    Lipid screen  09/15/2018    Diabetic retinal exam  09/26/2018    Diabetic hemoglobin A1C test  11/27/2018    Diabetic microalbuminuria test  11/27/2018    Colon cancer screen colonoscopy  07/26/2021    Flu vaccine  Completed    AAA screen  Completed    Hepatitis C screen  Completed       Labs  Lab Results   Component Value Date    LABA1C 7.2 (H) 11/27/2017     No results found for: EAG  Lab Results   Component Value Date    TSH 3.730 01/17/2017     Lab Results   Component Value Date    CHOL 142 09/15/2017    CHOL 172 05/02/2017    CHOL 130 06/22/2016     Lab Results   Component Value Date    TRIG 151 09/15/2017    TRIG 154 05/02/2017    TRIG 140 06/22/2016     Lab Results   Component Value Date    HDL 31 09/15/2017    HDL 29 05/02/2017    HDL 31 06/22/2016     Lab Results   Component Value Date    LDLCALC 81 09/15/2017    LDLCALC 112 05/02/2017    LDLCALC 71 06/22/2016     No results found for: LABVLDL, VLDL  No results found for: CHOLHDLRATIO      Review of Systems  Constitutional: negative for chills, fatigue and fevers  Eyes: negative for irritation, redness and visual disturbance  Respiratory: negative for cough, shortness of breath and wheezing  Cardiovascular: negative for chest pain, irregular heart beat and palpitations    The remainder of systems were reviewed and negative. Objective:   BP (!) 154/88   Pulse 66   Resp 16   Ht 5' 8.9\" (1.75 m)   Wt 193 lb 8 oz (87.8 kg)   BMI 28.66 kg/m²     General:  alert, appears stated age, cooperative and no distress   Oropharynx: normal findings: lips normal without lesions, buccal mucosa normal, gums healthy and tongue midline and normal    Eyes:  negative findings: lids and lashes normal, conjunctivae and sclerae normal, corneas clear and pupils equal, round, reactive to light and accomodation       Neck: no adenopathy, no carotid bruit, no JVD, supple, symmetrical, trachea midline and thyroid not enlarged, symmetric, no tenderness/mass/nodules   Thyroid:  no palpable nodule   Lung: clear to auscultation bilaterally   Heart:  regular rate and rhythm, S1, S2 normal, no murmur, click, rub or gallop and normal apical impulse   Abdomen: soft, non-tender; bowel sounds normal; no masses,  no organomegaly   Extremities: extremities normal, atraumatic, no cyanosis or edema and Homans sign is negative, no sign of DVT   Pulses: 2+ and symmetric   Skin: warm and dry, no hyperpigmentation, vitiligo, or suspicious lesions   Neuro: normal without focal findings, mental status, speech normal, alert and oriented x3, ARISTIDES, cranial nerves 2-12 intact, muscle tone and strength normal and symmetric. LNS: no cervical/supraclavicular or submental adenopathy  Psych: awake, alert and oriented, with good eye contact and normal behavior. Affect is normal with no anxious or depressed mood. Insight is appropriate with no hallucinations/delusions or SI  M/S: no muscular hypertrophy or tenderness. There is no joint swelling/tenderness/redness  Assessment/Plan:     1. Uncontrolled type 2 diabetes mellitus without complication, without long-term current use of insulin ([address]): Uncontrolled with mild hyperglycemia. The patient was instructed to adjust his nutrition accordingly.   I will not change medication at this

## 2017-12-04 NOTE — TELEPHONE ENCOUNTER
Patient had appt w dr Jeff Navas today and his bp mig689/85 then he took his bp meds, saw   bp then 154/88. They advised patient to f/u w dr Gabrielle Stout.

## 2017-12-04 NOTE — TELEPHONE ENCOUNTER
Called Dr. Mahendra Bird office and spoke with [address] Per Dr. Malgorzata Su request, let her know that Bahman's bp was 187/85 and when was rechecked after some time and he taking his bp meds, it was still 154/88. We advised him to recheck this afternoon and call Dr. Mahendra Bird office for them to follow up.

## 2017-12-04 NOTE — LETTER
this patient is also treated for hyperlipidemia and hypertension. She takes a combination of Pravachol and Zetia for cholesterol, with no musculoskeletal complaints. Blood pressure she is taking 300 mg of Avapro daily with Coreg 25 mg twice a day. His blood pressure was elevated today but he denies headaches, chest pain and shortness of breath.   Past Medical History:   Diagnosis Date    Erectile dysfunction     Hyperlipidemia     Hypertension     LEW treated with BiPAP     Type 2 diabetes mellitus ([facility] [address] 2000      Past Surgical History:   Procedure Laterality Date    APPENDECTOMY  as teenager    BACK SURGERY  1988    herniated disc- L5-S1    CATARACT REMOVAL Bilateral     COLONOSCOPY  07/2016    ENDOSCOPIC ULTRASOUND (UPPER)  02/22/2017    EYE SURGERY Right 2005    TONSILLECTOMY      as teenager    UPPER GASTROINTESTINAL ENDOSCOPY         Family History   Problem Relation Age of Onset    Diabetes Mother     Cancer Mother      lung    Diabetes Father     Kidney Disease Father     Heart Attack Paternal Grandfather 72     Social History   Substance Use Topics    Smoking status: Former Smoker     Packs/day: 1.25     Years: 20.00     Quit date: 1/1/1995    Smokeless tobacco: Former User     Types: Chew     Quit date: 1/1/1990    Alcohol use No      Current Outpatient Prescriptions   Medication Sig Dispense Refill    SITagliptin (JANUVIA) 100 MG tablet Take 1 tablet by mouth daily 90 tablet 3    glimepiride (AMARYL) 1 MG tablet Take 1 tablet by mouth every evening Take along with 2mg tablet at supper for total of 3mg (Patient taking differently: Take 1 mg by mouth every evening Take along with 2mg tablet at breakfast and 3mg in the evening) 90 tablet 3    pravastatin (PRAVACHOL) 20 MG tablet Take 1 tablet by mouth every evening For cholesterol 30 tablet 5    glimepiride (AMARYL) 2 MG tablet TAKE ONE TABLET BY MOUTH TWICE DAILY 180 tablet 2  metFORMIN (GLUCOPHAGE) 1000 MG tablet TAKE ONE TABLET BY MOUTH TWICE DAILY WITH MEALS 180 tablet 3    ezetimibe (ZETIA) 10 MG tablet Take 1 tablet by mouth daily 30 tablet 3    irbesartan (AVAPRO) 300 MG tablet TAKE ONE TABLET BY MOUTH ONCE DAILY 90 tablet 3    ranitidine (ZANTAC) 150 MG capsule Take 150 mg by mouth every evening      ONE TOUCH ULTRA TEST STRP       Cinnamon 500 MG TABS Take 1 capsule by mouth daily      aspirin 81 MG tablet Take 81 mg by mouth daily      carvedilol (COREG) 25 MG tablet Take 1 tablet by mouth 2 times daily 60 tablet 3     No current facility-administered medications for this visit.       Allergies   Allergen Reactions    Crestor [Rosuvastatin]      Muscle aches    Lipitor [Atorvastatin]      Back ache     Health Maintenance   Topic Date Due    DTaP/Tdap/Td vaccine (1 - Tdap) 03/16/1970    Zostavax vaccine  03/16/2011    Pneumococcal low/med risk (2 of 2 - PPSV23) 06/21/2017    Diabetic foot exam  08/14/2018    Lipid screen  09/15/2018    Diabetic retinal exam  09/26/2018    Diabetic hemoglobin A1C test  11/27/2018    Diabetic microalbuminuria test  11/27/2018    Colon cancer screen colonoscopy  07/26/2021    Flu vaccine  Completed    AAA screen  Completed    Hepatitis C screen  Completed       Labs  Lab Results   Component Value Date    LABA1C 7.2 (H) 11/27/2017     No results found for: EAG  Lab Results   Component Value Date    TSH 3.730 01/17/2017     Lab Results   Component Value Date    CHOL 142 09/15/2017    CHOL 172 05/02/2017    CHOL 130 06/22/2016     Lab Results   Component Value Date    TRIG 151 09/15/2017    TRIG 154 05/02/2017    TRIG 140 06/22/2016     Lab Results   Component Value Date    HDL 31 09/15/2017    HDL 29 05/02/2017    HDL 31 06/22/2016     Lab Results   Component Value Date    LDLCALC 81 09/15/2017    LDLCALC 112 05/02/2017    LDLCALC 71 06/22/2016     No results found for: LABVLDL, VLDL  No results found for: CHOLHDLRATIO Review of Systems  Constitutional: negative for chills, fatigue and fevers  Eyes: negative for irritation, redness and visual disturbance  Respiratory: negative for cough, shortness of breath and wheezing  Cardiovascular: negative for chest pain, irregular heart beat and palpitations    The remainder of systems were reviewed and negative. Objective:   BP (!) 154/88   Pulse 66   Resp 16   Ht 5' 8.9\" (1.75 m)   Wt 193 lb 8 oz (87.8 kg)   BMI 28.66 kg/m²      General:  alert, appears stated age, cooperative and no distress   Oropharynx: normal findings: lips normal without lesions, buccal mucosa normal, gums healthy and tongue midline and normal    Eyes:  negative findings: lids and lashes normal, conjunctivae and sclerae normal, corneas clear and pupils equal, round, reactive to light and accomodation       Neck: no adenopathy, no carotid bruit, no JVD, supple, symmetrical, trachea midline and thyroid not enlarged, symmetric, no tenderness/mass/nodules   Thyroid:  no palpable nodule   Lung: clear to auscultation bilaterally   Heart:  regular rate and rhythm, S1, S2 normal, no murmur, click, rub or gallop and normal apical impulse   Abdomen: soft, non-tender; bowel sounds normal; no masses,  no organomegaly   Extremities: extremities normal, atraumatic, no cyanosis or edema and Homans sign is negative, no sign of DVT   Pulses: 2+ and symmetric   Skin: warm and dry, no hyperpigmentation, vitiligo, or suspicious lesions   Neuro: normal without focal findings, mental status, speech normal, alert and oriented x3, ARISTIDES, cranial nerves 2-12 intact, muscle tone and strength normal and symmetric. LNS: no cervical/supraclavicular or submental adenopathy  Psych: awake, alert and oriented, with good eye contact and normal behavior. Affect is normal with no anxious or depressed mood. Insight is appropriate with no hallucinations/delusions or SI  M/S: no muscular hypertrophy or tenderness.  There is no joint swelling/tenderness/redness  Assessment/Plan:     1. Uncontrolled type 2 diabetes mellitus without complication, without long-term current use of insulin ([address]): Uncontrolled with mild hyperglycemia. The patient was instructed to adjust his nutrition accordingly. I will not change medication at this point. 2. Hyperparathyroidism ([address]): Eyelids disease. He was given a referral to [facility] genetic counseling to rule out familial hypocalciuric hypercalcemia. This is pending. 3. Pure hypercholesterolemia: clinically doing well on statins with no evidence of liver or muscle damage. Goals of therapy were discussed with patient in detail. 4. Essential hypertension: Uncontrolled. He reports compliance with his medication. To be followed by PCP. Orders Placed This Encounter   Procedures    PTH, Intact     Standing Status:   Future     Number of Occurrences:   1     Standing Expiration Date:   12/4/2018    Magnesium     Standing Status:   Future     Number of Occurrences:   1     Standing Expiration Date:   12/4/2018    Calcium, Ionized     Standing Status:   Future     Number of Occurrences:   1     Standing Expiration Date:   12/4/2018    Vitamin D 25 Hydroxy     Standing Status:   Future     Number of Occurrences:   1     Standing Expiration Date:   12/4/2018    Vitamin D 1,25 Dihydroxy     Standing Status:   Future     Number of Occurrences:   1     Standing Expiration Date:   12/4/2018    Comprehensive Metabolic Panel     Standing Status:   Future     Number of Occurrences:   1     Standing Expiration Date:   12/4/2018       · The risks and benefits of my recommendations, as well as other treatment options were discussed with the patient today. Questions were answered. · Follow up: 3 months and as needed. If you have questions, please do not hesitate to call me. I look forward to following Glen along with you.     Sincerely,        Sandy Martinez MD

## 2017-12-06 LAB — CALCIUM IONIZED: NORMAL

## 2017-12-07 LAB — VITAMIN D 1,25-DIHYDROXY: 46.7 PG/ML (ref 19.9–79.3)

## 2017-12-11 ENCOUNTER — HOSPITAL ENCOUNTER (OUTPATIENT)
Dept: ULTRASOUND IMAGING | Age: 67
Discharge: HOME | End: 2017-12-11
Payer: MEDICARE

## 2017-12-11 DIAGNOSIS — Z13.6 SCREENING FOR AAA (ABDOMINAL AORTIC ANEURYSM): ICD-10-CM

## 2017-12-11 PROCEDURE — 76706 US ABDL AORTA SCREEN AAA: CPT

## 2017-12-18 ENCOUNTER — NURSE ONLY (OUTPATIENT)
Dept: FAMILY MEDICINE CLINIC | Age: 67
End: 2017-12-18
Payer: MEDICARE

## 2017-12-18 VITALS — SYSTOLIC BLOOD PRESSURE: 138 MMHG | DIASTOLIC BLOOD PRESSURE: 74 MMHG | HEART RATE: 62 BPM

## 2017-12-18 DIAGNOSIS — I10 ESSENTIAL HYPERTENSION: Primary | ICD-10-CM

## 2017-12-18 PROCEDURE — 99211 OFF/OP EST MAY X REQ PHY/QHP: CPT | Performed by: FAMILY MEDICINE

## 2017-12-18 RX ORDER — DOXAZOSIN MESYLATE 1 MG/1
1 TABLET ORAL NIGHTLY
Qty: 30 TABLET | Refills: 3 | Status: SHIPPED | OUTPATIENT
Start: 2017-12-18 | End: 2018-01-25 | Stop reason: SDUPTHER

## 2017-12-18 NOTE — PROGRESS NOTES
BP here today borderline, but have been running high at home and at endo appt recently. Start new BP med called doxazosin (cardura). Script sent  Nurse visit in 2-3 wks for BP recheck. Watch for low BPs/dizziness.     Call pt

## 2017-12-18 NOTE — PROGRESS NOTES
Patient voiced that he doesn't like to keeping adding BP medication since he feels like nothing is working patient is asking if he can change BP medication to only having to take one.     Please Marizol Ellison

## 2018-01-02 DIAGNOSIS — E55.9 VITAMIN D INSUFFICIENCY: Primary | ICD-10-CM

## 2018-01-02 DIAGNOSIS — E83.42 HYPOMAGNESEMIA: ICD-10-CM

## 2018-01-02 RX ORDER — SWAB
1 SWAB, NON-MEDICATED MISCELLANEOUS DAILY
Qty: 30 CAPSULE | Refills: 1 | Status: SHIPPED | OUTPATIENT
Start: 2018-01-02 | End: 2018-03-06 | Stop reason: SDUPTHER

## 2018-01-04 ENCOUNTER — TELEPHONE (OUTPATIENT)
Dept: ENDOCRINOLOGY | Age: 68
End: 2018-01-04

## 2018-01-04 DIAGNOSIS — E55.9 VITAMIN D INSUFFICIENCY: Primary | ICD-10-CM

## 2018-01-04 DIAGNOSIS — E83.42 HYPOMAGNESEMIA: ICD-10-CM

## 2018-01-08 ENCOUNTER — TELEPHONE (OUTPATIENT)
Dept: FAMILY MEDICINE CLINIC | Age: 68
End: 2018-01-08

## 2018-01-08 DIAGNOSIS — Z20.828 EXPOSURE TO INFLUENZA: Primary | ICD-10-CM

## 2018-01-08 RX ORDER — OSELTAMIVIR PHOSPHATE 75 MG/1
75 CAPSULE ORAL DAILY
Qty: 10 CAPSULE | Refills: 0 | Status: SHIPPED | OUTPATIENT
Start: 2018-01-08 | End: 2018-01-18

## 2018-01-08 NOTE — TELEPHONE ENCOUNTER
If has had exposure with granddaughter in past couple of days, then can take preventative tamiflu dosing of 1 tab daily x 10 days.   Also, recommend wash hands frequently, push fluids, rest.    Script sent     Call pt

## 2018-01-23 ENCOUNTER — TELEPHONE (OUTPATIENT)
Dept: FAMILY MEDICINE CLINIC | Age: 68
End: 2018-01-23

## 2018-01-23 NOTE — TELEPHONE ENCOUNTER
Patient is having issues with blood pressures. 162/92 one side then 170/84 other side. That was just a few minutes ago (1600). Has a funny feeling in chest sometimes that goes to head. This has been ongoing for about 3 to 4 weeks. It comes and goes. No chest pain, not sob. Just kind of a heaviness. Not slowing him down really, but he does say he doesn't feel well. His bp med was changed about 1 month ago.   Pharmacy is [facility] [place]  dolv 12/18/17  nabeel 2/26/18

## 2018-01-23 NOTE — TELEPHONE ENCOUNTER
Noted  I'm not in office tomorrow so can either see on of the other providers tomorrow or I'll fit him in at 4:30 on Thursday afternoon as long as symptoms don't worsen in meantime. Does he mean he's had these symptoms since starting the Cardura at night about 4 weeks ago or did it start after that?

## 2018-01-23 NOTE — TELEPHONE ENCOUNTER
Patient voiced he is not going to go to the ER tonight, he does not have this feeling all the time. Voice if Dr. Rm Chacko wants to see him in the office tomorrow he is fine with coming in for an appointment but does not see the necessity of going to the ER. Please advise.

## 2018-01-25 ENCOUNTER — OFFICE VISIT (OUTPATIENT)
Dept: FAMILY MEDICINE CLINIC | Age: 68
End: 2018-01-25
Payer: MEDICARE

## 2018-01-25 VITALS
RESPIRATION RATE: 16 BRPM | BODY MASS INDEX: 29.09 KG/M2 | HEART RATE: 56 BPM | SYSTOLIC BLOOD PRESSURE: 148 MMHG | DIASTOLIC BLOOD PRESSURE: 80 MMHG | OXYGEN SATURATION: 97 % | WEIGHT: 196.4 LBS

## 2018-01-25 DIAGNOSIS — I10 ESSENTIAL HYPERTENSION: ICD-10-CM

## 2018-01-25 DIAGNOSIS — R07.89 CHEST PRESSURE: Primary | ICD-10-CM

## 2018-01-25 PROCEDURE — G8417 CALC BMI ABV UP PARAM F/U: HCPCS | Performed by: FAMILY MEDICINE

## 2018-01-25 PROCEDURE — 3017F COLORECTAL CA SCREEN DOC REV: CPT | Performed by: FAMILY MEDICINE

## 2018-01-25 PROCEDURE — 1036F TOBACCO NON-USER: CPT | Performed by: FAMILY MEDICINE

## 2018-01-25 PROCEDURE — G8427 DOCREV CUR MEDS BY ELIG CLIN: HCPCS | Performed by: FAMILY MEDICINE

## 2018-01-25 PROCEDURE — 93000 ELECTROCARDIOGRAM COMPLETE: CPT | Performed by: FAMILY MEDICINE

## 2018-01-25 PROCEDURE — 4040F PNEUMOC VAC/ADMIN/RCVD: CPT | Performed by: FAMILY MEDICINE

## 2018-01-25 PROCEDURE — 99214 OFFICE O/P EST MOD 30 MIN: CPT | Performed by: FAMILY MEDICINE

## 2018-01-25 PROCEDURE — G8484 FLU IMMUNIZE NO ADMIN: HCPCS | Performed by: FAMILY MEDICINE

## 2018-01-25 PROCEDURE — 1123F ACP DISCUSS/DSCN MKR DOCD: CPT | Performed by: FAMILY MEDICINE

## 2018-01-25 RX ORDER — DOXAZOSIN MESYLATE 1 MG/1
2 TABLET ORAL NIGHTLY
Qty: 60 TABLET | Refills: 0
Start: 2018-01-25 | End: 2018-04-16

## 2018-01-25 NOTE — PROGRESS NOTES
Chung Burgos is a 78 y.o. male who presents today for:   Chief Complaint   Patient presents with    Discuss Medications    Other     chest tightness started a week ago getting worse          HPI:     HPI   Here to discuss chest tightness that has been going on for past few weeks. Increased gas and bloating recently and feels that this is cause of chest tightness. Started to take zantac past couple of days--Tightness is better. Worse after eating. Doesn't occur with exertion. BPs still variable and elevated. Tolerating cardura 1mg night. Thought maybe this recent med addition caused above sx, but now doesn't think it has. Patient's medications, allergies, past medical, surgical, social, and family histories were reviewed and updated as appropriate. Outpatient Medications Prior to Visit   Medication Sig Dispense Refill    magnesium oxide (MAG-OX) 400 (241.3 Mg) MG TABS tablet TAKE ONE TABLET BY MOUTH  DAILY 30 tablet 3    Vitamin D, Cholecalciferol, 400 units CAPS Take 1 capsule by mouth daily 30 capsule 1    doxazosin (CARDURA) 1 MG tablet Take 1 tablet by mouth nightly For blood pressure.  30 tablet 3    SITagliptin (JANUVIA) 100 MG tablet Take 1 tablet by mouth daily 90 tablet 3    carvedilol (COREG) 25 MG tablet Take 1 tablet by mouth 2 times daily 60 tablet 3    glimepiride (AMARYL) 1 MG tablet Take 1 tablet by mouth every evening Take along with 2mg tablet at supper for total of 3mg (Patient taking differently: Take 1 mg by mouth every evening Take along with 2mg tablet at breakfast and 3mg in the evening) 90 tablet 3    pravastatin (PRAVACHOL) 20 MG tablet Take 1 tablet by mouth every evening For cholesterol 30 tablet 5    glimepiride (AMARYL) 2 MG tablet TAKE ONE TABLET BY MOUTH TWICE DAILY 180 tablet 2    metFORMIN (GLUCOPHAGE) 1000 MG tablet TAKE ONE TABLET BY MOUTH TWICE DAILY WITH MEALS 180 tablet 3    irbesartan (AVAPRO) 300 MG tablet TAKE ONE TABLET BY MOUTH ONCE DAILY 90

## 2018-01-27 ASSESSMENT — ENCOUNTER SYMPTOMS
SHORTNESS OF BREATH: 0
CONSTIPATION: 0
BLOOD IN STOOL: 0
CHEST TIGHTNESS: 1
COUGH: 0
VOMITING: 0
NAUSEA: 0
STRIDOR: 0
ABDOMINAL PAIN: 0
DIARRHEA: 0
WHEEZING: 0

## 2018-01-31 ENCOUNTER — OFFICE VISIT (OUTPATIENT)
Dept: PULMONOLOGY | Age: 68
End: 2018-01-31
Payer: MEDICARE

## 2018-01-31 VITALS
OXYGEN SATURATION: 97 % | BODY MASS INDEX: 29.03 KG/M2 | WEIGHT: 196 LBS | HEART RATE: 56 BPM | HEIGHT: 69 IN | SYSTOLIC BLOOD PRESSURE: 138 MMHG | DIASTOLIC BLOOD PRESSURE: 80 MMHG

## 2018-01-31 DIAGNOSIS — G47.33 OSA TREATED WITH BIPAP: Primary | ICD-10-CM

## 2018-01-31 PROCEDURE — 4040F PNEUMOC VAC/ADMIN/RCVD: CPT | Performed by: PHYSICIAN ASSISTANT

## 2018-01-31 PROCEDURE — 99213 OFFICE O/P EST LOW 20 MIN: CPT | Performed by: PHYSICIAN ASSISTANT

## 2018-01-31 PROCEDURE — G8427 DOCREV CUR MEDS BY ELIG CLIN: HCPCS | Performed by: PHYSICIAN ASSISTANT

## 2018-01-31 PROCEDURE — 3017F COLORECTAL CA SCREEN DOC REV: CPT | Performed by: PHYSICIAN ASSISTANT

## 2018-01-31 PROCEDURE — G8417 CALC BMI ABV UP PARAM F/U: HCPCS | Performed by: PHYSICIAN ASSISTANT

## 2018-01-31 PROCEDURE — G8484 FLU IMMUNIZE NO ADMIN: HCPCS | Performed by: PHYSICIAN ASSISTANT

## 2018-01-31 PROCEDURE — 1123F ACP DISCUSS/DSCN MKR DOCD: CPT | Performed by: PHYSICIAN ASSISTANT

## 2018-01-31 PROCEDURE — 1036F TOBACCO NON-USER: CPT | Performed by: PHYSICIAN ASSISTANT

## 2018-01-31 RX ORDER — SILDENAFIL 25 MG/1
20 TABLET, FILM COATED ORAL PRN
COMMUNITY
End: 2019-04-30 | Stop reason: SDUPTHER

## 2018-01-31 RX ORDER — M-VIT,TX,IRON,MINS/CALC/FOLIC 27MG-0.4MG
1 TABLET ORAL DAILY
COMMUNITY

## 2018-01-31 NOTE — PROGRESS NOTES
History   Problem Relation Age of Onset    Diabetes Mother     Cancer Mother      lung    Diabetes Father     Kidney Disease Father     Heart Attack Paternal Grandfather 72        Review of Systems -   General ROS: stable / unchanged  ENT ROS: negative for - nasal congestion, oral lesions or sore throat  Hematological and Lymphatic ROS: negative  Endocrine ROS: negative  Respiratory ROS: no cough, shortness of breath, or wheezing  Cardiovascular ROS: no chest pain   Gastrointestinal ROS: no abdominal pain, change in bowel habits, or black or bloody stools  Musculoskeletal ROS: negative  Neurological ROS: negative    Physical Exam:    BMI:  Body mass index is 28.94 kg/m². Wt Readings from Last 3 Encounters:   01/31/18 196 lb (88.9 kg)   01/25/18 196 lb 6.4 oz (89.1 kg)   12/04/17 193 lb 8 oz (87.8 kg)     Vitals: /80   Pulse 56   Ht 5' 9\" (1.753 m)   Wt 196 lb (88.9 kg)   SpO2 97% Comment: RA at rest  BMI 28.94 kg/m²       General appearance: alert and oriented to person, place and time, well-developed and well-nourished, in no acute distress  Nose: Nares normal. Septum midline. Mucosa normal. No drainage or sinus tenderness. Oropharynx:  negative  Lungs: clear to auscultation bilaterally  Heart: regular rate and rhythm, S1, S2 normal, no murmur, click, rub or gallop  Extremities: extremities normal, atraumatic, no cyanosis or edema  Neurologic: Mental status: Alert, oriented, thought content appropriate      ASSESSMENT/DIAGNOSIS    1. LEW treated with BiPAP              Plan   Do you need any equipment today? Yes update supplies    - He  was advised to continue current positive airway pressure therapy with above described pressure. - He  advised to keep good compliance with current recommended pressure to get optimal results and clinical improvement  - Recommend 7-9 hours of sleep with PAP  - He was advised to call [employer] regarding supplies if needed.    - He call my office for earlier

## 2018-01-31 NOTE — PATIENT INSTRUCTIONS
Health Maintenance Due   Topic Date Due    DTaP/Tdap/Td vaccine (1 - Tdap) [birth year]/1970    Zostavax vaccine  03/16/2011    Pneumococcal low/med risk (2 of 2 - PPSV23) 06/21/2017

## 2018-02-12 ENCOUNTER — HOSPITAL ENCOUNTER (OUTPATIENT)
Age: 68
Discharge: HOME | End: 2018-02-12
Payer: MEDICARE

## 2018-02-12 ENCOUNTER — HOSPITAL ENCOUNTER (OUTPATIENT)
Age: 68
End: 2018-02-12
Payer: MEDICARE

## 2018-02-12 DIAGNOSIS — E55.9 VITAMIN D INSUFFICIENCY: ICD-10-CM

## 2018-02-12 DIAGNOSIS — E83.42 HYPOMAGNESEMIA: ICD-10-CM

## 2018-02-12 LAB
MAGNESIUM: 1.3 MG/DL (ref 1.6–2.4)
VITAMIN D 25-HYDROXY: 17 NG/ML (ref 30–100)

## 2018-02-12 PROCEDURE — 82306 VITAMIN D 25 HYDROXY: CPT

## 2018-02-12 PROCEDURE — 36415 COLL VENOUS BLD VENIPUNCTURE: CPT

## 2018-02-12 PROCEDURE — 83735 ASSAY OF MAGNESIUM: CPT

## 2018-02-22 ENCOUNTER — TELEPHONE (OUTPATIENT)
Dept: FAMILY MEDICINE CLINIC | Age: 68
End: 2018-02-22

## 2018-02-25 NOTE — PATIENT INSTRUCTIONS
prescribed by your doctor. Most people receive only one PPSV shot during their lifetime. However, people in certain age groups or with certain disease conditions that put them at risk of infection may need to receive more than one vaccine. Before receiving this vaccine, tell your doctor if you have received a pneumococcal vaccine within the past 3 to 5 years. What happens if I miss a dose? Since PPSV is usually given only one time, you will most likely not be on a dosing schedule. If you are receiving a repeat PPSV shot, be sure to tell your doctor if it has been less than 5 years since you last received a pneumococcal vaccine. What happens if I overdose? An overdose of this vaccine is not likely to occur. What should I avoid before or after receiving this vaccine ? Follow your doctor's instructions about any restrictions on food, beverages, or activity. What are the possible side effects of this vaccine? You should not receive a booster vaccine if you had a life-threatening allergic reaction after the first shot. Keep track of any and all side effects you have after receiving this vaccine. If you ever need to receive a booster dose, you will need to tell your doctor if the previous shot caused any side effects. Becoming infected with pneumococcal disease (such as pneumonia or meningitis) is much more dangerous to your health than receiving this vaccine. However, like any medicine, this vaccine can cause side effects but the risk of serious side effects is extremely low. Get emergency medical help if you have any of these signs of an allergic reaction: hives; difficulty breathing; swelling of your face, lips, tongue, or throat.   Call your doctor at once if you have a serious side effect such as:  · high fever (103 degrees or higher);  · easy bruising or bleeding;  · swollen glands with skin rash or itching, joint pain, and general ill feeling;  · pale or yellowed skin, dark colored urine, confusion or weakness;  · numbness or tingly feeling in your feet and spreading upward, severe lower back pain;  · changes in behavior, problems with vision, speech, swallowing, or bladder and bowel functions; or  · slow heart rate, trouble breathing, feeling like you might pass out. Less serious side effects are more likely to occur, such as:  · low fever (102 degrees or less), chills, tired feeling;  · swelling, pain, tenderness, or redness anywhere on your body;  · headache, nausea, vomiting;  · joint or muscle pain;  · swelling or stiffness in the arm or leg the vaccine was injected into;  · mild skin rash; or  · mild soreness, warmth, redness, swelling, or a hard lump where the shot was given. This is not a complete list of side effects and others may occur. Call your doctor for medical advice about side effects. You may report vaccine side effects to the [place] [address] and Human Services at [phone]. What other drugs will affect pneumococcal polysaccharides vaccine (PPSV)? Before receiving this vaccine, tell the doctor about all other vaccines you have recently received. Also tell the doctor if you have recently received drugs or treatments that can weaken the immune system, including:  · an oral, nasal, inhaled, or injectable steroid medicine;  · medications to treat psoriasis, rheumatoid arthritis, or other autoimmune disorders, such as azathioprine (Imuran), etanercept (Enbrel), leflunomide (Nahomy), and others; or  · medicines to treat or prevent organ transplant rejection, such as basiliximab (Simulect), cyclosporine (Sandimmune, Neoral, Gengraf), muromonab-CD3 (Orthoclone), mycophenolate mofetil (CellCept), sirolimus (Rapamune), or tacrolimus (Prograf). If you are using any of these medications, you may not be able to receive the vaccine, or may need to wait until the other treatments are finished. This list is not complete and other drugs may interact with PPSV.  Tell your doctor about all or adverse effects. If you have questions about the drugs you are taking, check with your doctor, nurse or pharmacist.  Copyright 2279-9822 [address]: 5.02. Revision date: 10/17/2012. Care instructions adapted under license by [facility] ([facility]). If you have questions about a medical condition or this instruction, always ask your healthcare professional. [place] [address] any warranty or liability for your use of this information. Patient Education        Learning About Diabetes Food Guidelines  Your Care Instructions    Meal planning is important to manage diabetes. It helps keep your blood sugar at a target level (which you set with your doctor). You don't have to eat special foods. You can eat what your family eats, including sweets once in a while. But you do have to pay attention to how often you eat and how much you eat of certain foods. You may want to work with a dietitian or a certified diabetes educator (CDE) to help you plan meals and snacks. A dietitian or CDE can also help you lose weight if that is one of your goals. What should you know about eating carbs? Managing the amount of carbohydrate (carbs) you eat is an important part of healthy meals when you have diabetes. Carbohydrate is found in many foods. · Learn which foods have carbs. And learn the amounts of carbs in different foods. ¨ Bread, cereal, pasta, and rice have about 15 grams of carbs in a serving. A serving is 1 slice of bread (1 ounce), ½ cup of cooked cereal, or 1/3 cup of cooked pasta or rice. ¨ Fruits have 15 grams of carbs in a serving. A serving is 1 small fresh fruit, such as an apple or orange; ½ of a banana; ½ cup of cooked or canned fruit; ½ cup of fruit juice; 1 cup of melon or raspberries; or 2 tablespoons of dried fruit. ¨ Milk and no-sugar-added yogurt have 15 grams of carbs in a serving. A serving is 1 cup of milk or 2/3 cup of no-sugar-added yogurt.   ¨ Starchy vegetables have 15 eat in the future. · If you eat more carbohydrate at a meal than you had planned, take a walk or do other exercise. This will help lower your blood sugar. What else should you know? · Limit saturated fat, such as the fat from meat and dairy products. This is a healthy choice because people who have diabetes are at higher risk of heart disease. So choose lean cuts of meat and nonfat or low-fat dairy products. Use olive or canola oil instead of butter or shortening when cooking. · Don't skip meals. Your blood sugar may drop too low if you skip meals and take insulin or certain medicines for diabetes. · Check with your doctor before you drink alcohol. Alcohol can cause your blood sugar to drop too low. Alcohol can also cause a bad reaction if you take certain diabetes medicines. Follow-up care is a key part of your treatment and safety. Be sure to make and go to all appointments, and call your doctor if you are having problems. It's also a good idea to know your test results and keep a list of the medicines you take. Where can you learn more? Go to https://Little Bird.JoinTV. org and sign in to your [employer] account. Enter R620 in the [facility] box to learn more about \"Learning About Diabetes Food Guidelines. \"     If you do not have an account, please click on the \"Sign Up Now\" link. Current as of: March 13, 2017  Content Version: 11.5  © 7792-7152 [employer]. Care instructions adapted under license by [facility] ([facility]). If you have questions about a medical condition or this instruction, always ask your healthcare professional. [place] [address] any warranty or liability for your use of this information. Patient Education        High Blood Pressure: Care Instructions  Your Care Instructions    If your blood pressure is usually above 140/90, you have high blood pressure, or hypertension.  That means the top number is 140 or higher or the bottom number is 90 put on weight around the waist. Losing even 10 pounds can help you lower your blood pressure. · If your doctor recommends it, get more exercise. Walking is a good choice. Bit by bit, increase the amount you walk every day. Try for at least 30 minutes on most days of the week. You also may want to swim, bike, or do other activities. · Avoid or limit alcohol. Talk to your doctor about whether you can drink any alcohol. · Try to limit how much sodium you eat to less than 2,300 milligrams (mg) a day. Your doctor may ask you to try to eat less than 1,500 mg a day. · Eat plenty of fruits (such as bananas and oranges), vegetables, legumes, whole grains, and low-fat dairy products. · Lower the amount of saturated fat in your diet. Saturated fat is found in animal products such as milk, cheese, and meat. Limiting these foods may help you lose weight and also lower your risk for heart disease. · Do not smoke. Smoking increases your risk for heart attack and stroke. If you need help quitting, talk to your doctor about stop-smoking programs and medicines. These can increase your chances of quitting for good. When should you call for help? Call 911 anytime you think you may need emergency care. This may mean having symptoms that suggest that your blood pressure is causing a serious heart or blood vessel problem. Your blood pressure may be over 180/110. ? For example, call 911 if:  ? · You have symptoms of a heart attack. These may include:  ¨ Chest pain or pressure, or a strange feeling in the chest.  ¨ Sweating. ¨ Shortness of breath. ¨ Nausea or vomiting. ¨ Pain, pressure, or a strange feeling in the back, neck, jaw, or upper belly or in one or both shoulders or arms. ¨ Lightheadedness or sudden weakness. ¨ A fast or irregular heartbeat. ? · You have symptoms of a stroke.  These may include:  ¨ Sudden numbness, tingling, weakness, or loss of movement in your face, arm, or leg, especially on only one side of your body.  ¨ Sudden vision changes. ¨ Sudden trouble speaking. ¨ Sudden confusion or trouble understanding simple statements. ¨ Sudden problems with walking or balance. ¨ A sudden, severe headache that is different from past headaches. ? · You have severe back or belly pain. ?Do not wait until your blood pressure comes down on its own. Get help right away. ?Call your doctor now or seek immediate care if:  ? · Your blood pressure is much higher than normal (such as 180/110 or higher), but you don't have symptoms. ? · You think high blood pressure is causing symptoms, such as:  ¨ Severe headache. ¨ Blurry vision. ? Watch closely for changes in your health, and be sure to contact your doctor if:  ? · Your blood pressure measures 140/90 or higher at least 2 times. That means the top number is 140 or higher or the bottom number is 90 or higher, or both. ? · You think you may be having side effects from your blood pressure medicine. ? · Your blood pressure is usually normal, but it goes above normal at least 2 times. Where can you learn more? Go to https://[employer]Tech Communications.quickhuddle. org and sign in to your [employer] account. Enter M095 in the [employer] box to learn more about \"High Blood Pressure: Care Instructions. \"     If you do not have an account, please click on the \"Sign Up Now\" link. Current as of: Ayaka 10, 2017  Content Version: 11.5  © 0814-1483 [employer]. Care instructions adapted under license by [facility] ([facility]). If you have questions about a medical condition or this instruction, always ask your healthcare professional. [place] [address] any warranty or liability for your use of this information. Patient Education        Back Strain: Care Instructions  Your Care Instructions    Back strain happens when you overstretch, or pull, a muscle in your back. You may hurt your back in an accident or when you exercise or lift something.   Most back pain [employer] account. Enter Z514 in the [facility] box to learn more about \"Back Strain: Care Instructions. \"     If you do not have an account, please click on the \"Sign Up Now\" link. Current as of: March 21, 2017  Content Version: 11.5  © 1191-3028 [employer]Learning. Care instructions adapted under license by [facility] ([facility]). If you have questions about a medical condition or this instruction, always ask your healthcare professional. [address] any warranty or liability for your use of this information. Patient Education        Low Back Pain: Exercises  Your Care Instructions  Here are some examples of typical rehabilitation exercises for your condition. Start each exercise slowly. Ease off the exercise if you start to have pain. Your doctor or physical therapist will tell you when you can start these exercises and which ones will work best for you. How to do the exercises  Press-up    1. Lie on your stomach, supporting your body with your forearms. 2. Press your elbows down into the floor to raise your upper back. As you do this, relax your stomach muscles and allow your back to arch without using your back muscles. As your press up, do not let your hips or pelvis come off the floor. 3. Hold for 15 to 30 seconds, then relax. 4. Repeat 2 to 4 times. Alternate arm and leg (bird dog) exercise    Do this exercise slowly. Try to keep your body straight at all times, and do not let one hip drop lower than the other. 1. Start on the floor, on your hands and knees. 2. Tighten your belly muscles. 3. Raise one leg off the floor, and hold it straight out behind you. Be careful not to let your hip drop down, because that will twist your trunk. 4. Hold for about 6 seconds, then lower your leg and switch to the other leg. 5. Repeat 8 to 12 times on each leg. 6. Over time, work up to holding for 10 to 30 seconds each time.   7. If you feel stable and secure with your leg are all in a straight line. 3. Hold for about 6 seconds as you continue to breathe normally, and then slowly lower your hips back down to the floor and rest for up to 10 seconds. 4. Do 8 to 12 repetitions. Hamstring stretch in doorway    1. Lie on your back in a doorway, with one leg through the open door. 2. Slide your leg up the wall to straighten your knee. You should feel a gentle stretch down the back of your leg. 3. Hold the stretch for at least 15 to 30 seconds. Do not arch your back, point your toes, or bend either knee. Keep one heel touching the floor and the other heel touching the wall. 4. Repeat with your other leg. 5. Do 2 to 4 times for each leg. Hip flexor stretch    1. Kneel on the floor with one knee bent and one leg behind you. Place your forward knee over your foot. Keep your other knee touching the floor. 2. Slowly push your hips forward until you feel a stretch in the upper thigh of your rear leg. 3. Hold the stretch for at least 15 to 30 seconds. Repeat with your other leg. 4. Do 2 to 4 times on each side. Wall sit    1. Stand with your back 10 to 12 inches away from a wall. 2. Lean into the wall until your back is flat against it. 3. Slowly slide down until your knees are slightly bent, pressing your lower back into the wall. 4. Hold for about 6 seconds, then slide back up the wall. 5. Repeat 8 to 12 times. Follow-up care is a key part of your treatment and safety. Be sure to make and go to all appointments, and call your doctor if you are having problems. It's also a good idea to know your test results and keep a list of the medicines you take. Where can you learn more? Go to https://Visual ThreatpeFitLinxx.Flooved. org and sign in to your [employer] account. Enter F150 in the [employer] box to learn more about \"Low Back Pain: Exercises. \"     If you do not have an account, please click on the \"Sign Up Now\" link.   Current as of: March 21, 2017  Content Version: 11.5  © 5163-4433 [employer]. Care instructions adapted under license by [facility] ([facility]). If you have questions about a medical condition or this instruction, always ask your healthcare professional. [address] any warranty or liability for your use of this information.

## 2018-02-26 ENCOUNTER — OFFICE VISIT (OUTPATIENT)
Dept: FAMILY MEDICINE CLINIC | Age: 68
End: 2018-02-26
Payer: MEDICARE

## 2018-02-26 VITALS
WEIGHT: 196.6 LBS | HEART RATE: 68 BPM | SYSTOLIC BLOOD PRESSURE: 132 MMHG | BODY MASS INDEX: 29.12 KG/M2 | HEIGHT: 69 IN | RESPIRATION RATE: 16 BRPM | DIASTOLIC BLOOD PRESSURE: 70 MMHG

## 2018-02-26 DIAGNOSIS — L57.0 ACTINIC KERATOSES: ICD-10-CM

## 2018-02-26 DIAGNOSIS — M54.42 ACUTE LEFT-SIDED LOW BACK PAIN WITH LEFT-SIDED SCIATICA: ICD-10-CM

## 2018-02-26 DIAGNOSIS — I10 ESSENTIAL HYPERTENSION: ICD-10-CM

## 2018-02-26 LAB — HBA1C MFR BLD: 7.4 %

## 2018-02-26 PROCEDURE — 83036 HEMOGLOBIN GLYCOSYLATED A1C: CPT | Performed by: FAMILY MEDICINE

## 2018-02-26 PROCEDURE — G8427 DOCREV CUR MEDS BY ELIG CLIN: HCPCS | Performed by: FAMILY MEDICINE

## 2018-02-26 PROCEDURE — 1036F TOBACCO NON-USER: CPT | Performed by: FAMILY MEDICINE

## 2018-02-26 PROCEDURE — 17000 DESTRUCT PREMALG LESION: CPT | Performed by: FAMILY MEDICINE

## 2018-02-26 PROCEDURE — 1123F ACP DISCUSS/DSCN MKR DOCD: CPT | Performed by: FAMILY MEDICINE

## 2018-02-26 PROCEDURE — 4040F PNEUMOC VAC/ADMIN/RCVD: CPT | Performed by: FAMILY MEDICINE

## 2018-02-26 PROCEDURE — 99214 OFFICE O/P EST MOD 30 MIN: CPT | Performed by: FAMILY MEDICINE

## 2018-02-26 PROCEDURE — G8484 FLU IMMUNIZE NO ADMIN: HCPCS | Performed by: FAMILY MEDICINE

## 2018-02-26 PROCEDURE — 3017F COLORECTAL CA SCREEN DOC REV: CPT | Performed by: FAMILY MEDICINE

## 2018-02-26 PROCEDURE — 17003 DESTRUCT PREMALG LES 2-14: CPT | Performed by: FAMILY MEDICINE

## 2018-02-26 PROCEDURE — 3045F PR MOST RECENT HEMOGLOBIN A1C LEVEL 7.0-9.0%: CPT | Performed by: FAMILY MEDICINE

## 2018-02-26 PROCEDURE — G8417 CALC BMI ABV UP PARAM F/U: HCPCS | Performed by: FAMILY MEDICINE

## 2018-02-26 RX ORDER — GLIMEPIRIDE 2 MG/1
2 TABLET ORAL
Qty: 30 TABLET | Refills: 3 | Status: CANCELLED | OUTPATIENT
Start: 2018-02-26

## 2018-02-26 ASSESSMENT — PATIENT HEALTH QUESTIONNAIRE - PHQ9
1. LITTLE INTEREST OR PLEASURE IN DOING THINGS: 0
2. FEELING DOWN, DEPRESSED OR HOPELESS: 0
SUM OF ALL RESPONSES TO PHQ9 QUESTIONS 1 & 2: 0
SUM OF ALL RESPONSES TO PHQ QUESTIONS 1-9: 0

## 2018-02-26 NOTE — PROGRESS NOTES
Tiffanie Vinson is a 78 y.o. male who presents today for:   Chief Complaint   Patient presents with    3 Month Follow-Up     HTN, DMII         HPI:     HPI     Low back pain x 3 weeks. Chiropractor no help. Ibuprofen has helped significantly, but didn't relieve completely. Taking 2 tabs 2-3 times per day. Slowly getting better. Worse with sitting at times and mid-way between bending and standing. Mainly L side and radiates down L leg. No numbness/weakness. No ray/bladder change. 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 Pain is gradually better past few days. HTN- controlled today. No cp/sob/ha. Tolerating meds OK. No dizziness. DMII- states is eating too much, ramesh in evenings. Thinks had 1-2 low sugars in past few mos. Had sx and resolved with eating. .  Usually if eats light breakfast and light lunch, and works hard, more apt to have low. Lows usually in 70-80s. Resolves with eating. Wants to gets off of Januvia, due to cost, and interested in getting off some of other meds  Lab Results   Component Value Date    LABA1C 7.4 02/26/2018     No results found for: EAG    Scattered rough areas on top of head and R side of face. Has had AK's in past    Patient's medications, allergies, past medical, surgical, social, and family histories were reviewed and updated as appropriate. Outpatient Medications Prior to Visit   Medication Sig Dispense Refill    sildenafil (VIAGRA) 25 MG tablet Take 20 mg by mouth as needed for Erectile Dysfunction      Multiple Vitamins-Minerals (THERAPEUTIC MULTIVITAMIN-MINERALS) tablet Take 1 tablet by mouth daily      doxazosin (CARDURA) 1 MG tablet Take 2 tablets by mouth nightly For blood pressure.  60 tablet 0    magnesium oxide (MAG-OX) 400 (241.3 Mg) MG TABS tablet TAKE ONE TABLET BY MOUTH  DAILY 30 tablet 3    Vitamin D, Cholecalciferol, 400 units CAPS Take 1 capsule by mouth daily 30 capsule 1    SITagliptin (Dodie Zacarias) 100 MG tablet Take 1 tablet by mouth daily 90 tablet 3    carvedilol (COREG) 25 MG tablet Take 1 tablet by mouth 2 times daily 60 tablet 3    glimepiride (AMARYL) 1 MG tablet Take 1 tablet by mouth every evening Take along with 2mg tablet at supper for total of 3mg (Patient taking differently: Take 1 mg by mouth every evening Take along with 2mg tablet at breakfast and 3mg in the evening) 90 tablet 3    pravastatin (PRAVACHOL) 20 MG tablet Take 1 tablet by mouth every evening For cholesterol 30 tablet 5    glimepiride (AMARYL) 2 MG tablet TAKE ONE TABLET BY MOUTH TWICE DAILY 180 tablet 2    metFORMIN (GLUCOPHAGE) 1000 MG tablet TAKE ONE TABLET BY MOUTH TWICE DAILY WITH MEALS 180 tablet 3    irbesartan (AVAPRO) 300 MG tablet TAKE ONE TABLET BY MOUTH ONCE DAILY 90 tablet 3    ranitidine (ZANTAC) 150 MG capsule Take 150 mg by mouth every evening      ONE TOUCH ULTRA TEST STRP       Cinnamon 500 MG TABS Take 1,000 mg by mouth daily       aspirin 81 MG tablet Take 81 mg by mouth daily       No facility-administered medications prior to visit. Subjective:      Review of Systems   Constitutional: Negative for activity change, appetite change, chills, diaphoresis, fatigue, fever and unexpected weight change. Respiratory: Negative for cough, shortness of breath, wheezing and stridor. Cardiovascular: Negative for chest pain, palpitations and leg swelling. Gastrointestinal: Negative for abdominal pain, blood in stool, constipation, diarrhea, nausea and vomiting. Genitourinary: Negative. Musculoskeletal: Positive for back pain. Neurological: Negative for headaches.        Objective:     Vitals:    02/26/18 0901   BP: 132/70   Site: Left Arm   Position: Sitting   Cuff Size: Medium Adult   Pulse: 68   Resp: 16   Weight: 196 lb 9.6 oz (89.2 kg)   Height: 5' 9.02\" (1.753 m)     Wt Readings from Last 3 Encounters:   02/26/18 196 lb 9.6 oz (89.2 kg)   01/31/18 196 lb (88.9 kg)   01/25/18 196 lb 6.4 oz (89.1 kg)     Physical Exam   Constitutional: He is oriented to person, place, and time. He appears well-developed and well-nourished. No distress. HENT:   Head: Normocephalic and atraumatic. Neck: Neck supple. No thyromegaly present. Cardiovascular: Normal rate, regular rhythm and normal heart sounds. Exam reveals no gallop and no friction rub. No murmur heard. Pulmonary/Chest: Effort normal and breath sounds normal. No stridor. No respiratory distress. He has no wheezes. He has no rales. He exhibits no tenderness. Musculoskeletal: He exhibits no edema. Right shoulder: He exhibits decreased range of motion, tenderness, pain and spasm. He exhibits no bony tenderness, no swelling, no effusion, no crepitus, no deformity, no laceration and normal strength. Left hip: Normal.        Left knee: Normal.        Arms:  Neg SLR b/l   Lymphadenopathy:     He has no cervical adenopathy. Neurological: He is alert and oriented to person, place, and time. Skin: He is not diaphoretic. Psychiatric: He has a normal mood and affect. His behavior is normal. Judgment and thought content normal.   Nursing note and vitals reviewed. Treatment of AK on R cheek, 3 on top of head-Liquid nitrogen was applied for 10-12 seconds to the skin lesions and the expected blistering or scabbing reaction explained. Do not pick at the areas. Patient reminded to expect hypopigmented scars from the procedure. Return if lesions fail to fully resolve. Assessment/Plan:     1. Uncontrolled type 2 diabetes mellitus without complication, without long-term current use of insulin ([facility])  POCT glycosylated hemoglobin (Hb A1C)    [facility] - Dietitian    CANCELED: External Referral To Diabetic Education   2. Essential hypertension  [facility]. [facility] - Dietitian    CANCELED: External Referral To Diabetic Education   3.  Acute left-sided low back pain with left-sided

## 2018-02-27 ASSESSMENT — ENCOUNTER SYMPTOMS
SHORTNESS OF BREATH: 0
DIARRHEA: 0
COUGH: 0
ABDOMINAL PAIN: 0
BACK PAIN: 1
NAUSEA: 0
WHEEZING: 0
STRIDOR: 0
VOMITING: 0
CONSTIPATION: 0
BLOOD IN STOOL: 0

## 2018-03-05 ENCOUNTER — TELEPHONE (OUTPATIENT)
Dept: ENDOCRINOLOGY | Age: 68
End: 2018-03-05

## 2018-03-05 ENCOUNTER — OFFICE VISIT (OUTPATIENT)
Dept: ENDOCRINOLOGY | Age: 68
End: 2018-03-05
Payer: MEDICARE

## 2018-03-05 ENCOUNTER — TELEPHONE (OUTPATIENT)
Dept: FAMILY MEDICINE CLINIC | Age: 68
End: 2018-03-05

## 2018-03-05 ENCOUNTER — HOSPITAL ENCOUNTER (OUTPATIENT)
Age: 68
Discharge: HOME | End: 2018-03-05
Payer: MEDICARE

## 2018-03-05 VITALS
WEIGHT: 197.5 LBS | HEART RATE: 66 BPM | HEIGHT: 69 IN | DIASTOLIC BLOOD PRESSURE: 80 MMHG | SYSTOLIC BLOOD PRESSURE: 150 MMHG | RESPIRATION RATE: 16 BRPM | BODY MASS INDEX: 29.25 KG/M2

## 2018-03-05 DIAGNOSIS — I10 ESSENTIAL HYPERTENSION: ICD-10-CM

## 2018-03-05 DIAGNOSIS — E83.52 HYPERCALCEMIA: ICD-10-CM

## 2018-03-05 DIAGNOSIS — E83.42 HYPOMAGNESEMIA: ICD-10-CM

## 2018-03-05 DIAGNOSIS — I10 ESSENTIAL HYPERTENSION: Primary | ICD-10-CM

## 2018-03-05 DIAGNOSIS — E11.9 TYPE 2 DIABETES MELLITUS WITHOUT COMPLICATION, WITHOUT LONG-TERM CURRENT USE OF INSULIN (HCC): Primary | ICD-10-CM

## 2018-03-05 DIAGNOSIS — E55.9 HYPOVITAMINOSIS D: ICD-10-CM

## 2018-03-05 LAB
ALBUMIN SERPL-MCNC: 4.1 G/DL (ref 3.5–5.1)
ALP BLD-CCNC: 58 U/L (ref 38–126)
ALT SERPL-CCNC: 20 U/L (ref 11–66)
ANION GAP SERPL CALCULATED.3IONS-SCNC: 11 MEQ/L (ref 8–16)
AST SERPL-CCNC: 14 U/L (ref 5–40)
BASOPHILS # BLD: 0.9 %
BASOPHILS ABSOLUTE: 0.1 THOU/MM3 (ref 0–0.1)
BILIRUB SERPL-MCNC: 0.4 MG/DL (ref 0.3–1.2)
BUN BLDV-MCNC: 17 MG/DL (ref 7–22)
CALCIUM SERPL-MCNC: 10.9 MG/DL (ref 8.5–10.5)
CHLORIDE BLD-SCNC: 103 MEQ/L (ref 98–111)
CHOLESTEROL, TOTAL: 135 MG/DL (ref 100–199)
CO2: 30 MEQ/L (ref 23–33)
CREAT SERPL-MCNC: 0.8 MG/DL (ref 0.4–1.2)
EOSINOPHIL # BLD: 1.8 %
EOSINOPHILS ABSOLUTE: 0.1 THOU/MM3 (ref 0–0.4)
GFR SERPL CREATININE-BSD FRML MDRD: > 90 ML/MIN/1.73M2
GLUCOSE BLD-MCNC: 170 MG/DL (ref 70–108)
HCT VFR BLD CALC: 39.9 % (ref 42–52)
HDLC SERPL-MCNC: 35 MG/DL
HEMOGLOBIN: 13.1 GM/DL (ref 14–18)
LDL CHOLESTEROL CALCULATED: 76 MG/DL
LYMPHOCYTES # BLD: 31.7 %
LYMPHOCYTES ABSOLUTE: 2.1 THOU/MM3 (ref 1–4.8)
MCH RBC QN AUTO: 28.6 PG (ref 27–31)
MCHC RBC AUTO-ENTMCNC: 32.8 GM/DL (ref 33–37)
MCV RBC AUTO: 87.3 FL (ref 80–94)
MONOCYTES # BLD: 9.5 %
MONOCYTES ABSOLUTE: 0.6 THOU/MM3 (ref 0.4–1.3)
NUCLEATED RED BLOOD CELLS: 0 /100 WBC
PDW BLD-RTO: 14.1 % (ref 11.5–14.5)
PLATELET # BLD: 225 THOU/MM3 (ref 130–400)
PMV BLD AUTO: 9.7 FL (ref 7.4–10.4)
POTASSIUM SERPL-SCNC: 5.4 MEQ/L (ref 3.5–5.2)
RBC # BLD: 4.57 MILL/MM3 (ref 4.7–6.1)
SEG NEUTROPHILS: 56.1 %
SEGMENTED NEUTROPHILS ABSOLUTE COUNT: 3.8 THOU/MM3 (ref 1.8–7.7)
SODIUM BLD-SCNC: 144 MEQ/L (ref 135–145)
TOTAL PROTEIN: 6.4 G/DL (ref 6.1–8)
TRIGL SERPL-MCNC: 119 MG/DL (ref 0–199)
WBC # BLD: 6.7 THOU/MM3 (ref 4.8–10.8)

## 2018-03-05 PROCEDURE — 36415 COLL VENOUS BLD VENIPUNCTURE: CPT

## 2018-03-05 PROCEDURE — G8484 FLU IMMUNIZE NO ADMIN: HCPCS | Performed by: CLINICAL NURSE SPECIALIST

## 2018-03-05 PROCEDURE — 85025 COMPLETE CBC W/AUTO DIFF WBC: CPT

## 2018-03-05 PROCEDURE — 1123F ACP DISCUSS/DSCN MKR DOCD: CPT | Performed by: CLINICAL NURSE SPECIALIST

## 2018-03-05 PROCEDURE — 80053 COMPREHEN METABOLIC PANEL: CPT

## 2018-03-05 PROCEDURE — 80061 LIPID PANEL: CPT

## 2018-03-05 PROCEDURE — G8427 DOCREV CUR MEDS BY ELIG CLIN: HCPCS | Performed by: CLINICAL NURSE SPECIALIST

## 2018-03-05 PROCEDURE — 3045F PR MOST RECENT HEMOGLOBIN A1C LEVEL 7.0-9.0%: CPT | Performed by: CLINICAL NURSE SPECIALIST

## 2018-03-05 PROCEDURE — 1036F TOBACCO NON-USER: CPT | Performed by: CLINICAL NURSE SPECIALIST

## 2018-03-05 PROCEDURE — 3017F COLORECTAL CA SCREEN DOC REV: CPT | Performed by: CLINICAL NURSE SPECIALIST

## 2018-03-05 PROCEDURE — 99214 OFFICE O/P EST MOD 30 MIN: CPT | Performed by: CLINICAL NURSE SPECIALIST

## 2018-03-05 PROCEDURE — G8417 CALC BMI ABV UP PARAM F/U: HCPCS | Performed by: CLINICAL NURSE SPECIALIST

## 2018-03-05 PROCEDURE — 4040F PNEUMOC VAC/ADMIN/RCVD: CPT | Performed by: CLINICAL NURSE SPECIALIST

## 2018-03-05 RX ORDER — HYDROCHLOROTHIAZIDE 12.5 MG/1
12.5 TABLET ORAL DAILY
Qty: 30 TABLET | Refills: 3 | Status: SHIPPED | OUTPATIENT
Start: 2018-03-05 | End: 2018-06-04

## 2018-03-05 ASSESSMENT — ENCOUNTER SYMPTOMS
SHORTNESS OF BREATH: 0
EYE REDNESS: 0
NAUSEA: 0
CHEST TIGHTNESS: 0
ABDOMINAL PAIN: 0
VOICE CHANGE: 0
CONSTIPATION: 0
WHEEZING: 0
DIARRHEA: 0
COUGH: 0

## 2018-03-05 NOTE — PROGRESS NOTES
[facility] PROFESSIONAL SERVS  ENDOCRINE DIABETES METABOLISM [facility]  [address]  [address]  [address] [address]  Dept: [phone]  Dept Fax: [phone]  Loc: Mariam Meadows is a 78 y.o. male who presents today for follow up evaluation for type 2 diabetes diagnosed 1999. Last visit 12/2017 with Dr. Bereket Holman. Patient is maintained on metformin, amaryl, [place] and [place]. Three blood sugar readings on download fasting 142, 164, pre lunch 116. A1c 7.4% last month. Hyperparathyroidism - patient completed [facility] referral for genetic counseling to rule out familial hypoclciuric hypercalcemia. Results in chart - will refer patient to endocrinologist for assessment/management as discussed with patient. 3/2018 calcium 10.9, Sestamibi scan 6/2015 - No definite evidence of parathyroid adenoma is identified. No history of kidney stones, no polydipsia, polyuria, no muscle pain.     Chief Complaint   Patient presents with    Diabetes     type 2    Foot Problem     denies any    Eye Exam     10/2017    Other     hyperparathyroidism     Other     hypercholesterolemia    Discuss Labs     completed 02/12/18,03/05/18       HPI:     HPI    Past Medical History:   Diagnosis Date    Erectile dysfunction     Hyperlipidemia     Hypertension     LEW treated with BiPAP     Type 2 diabetes mellitus ([address] 2000      Past Surgical History:   Procedure Laterality Date    APPENDECTOMY  as teenager    BACK SURGERY  1988    herniated disc- L5-S1    CATARACT REMOVAL Bilateral     COLONOSCOPY  07/2016    ENDOSCOPIC ULTRASOUND (UPPER)  02/22/2017    EYE SURGERY Right 2005    TONSILLECTOMY      as teenager    UPPER GASTROINTESTINAL ENDOSCOPY       Family History   Problem Relation Age of Onset    Diabetes Mother     Cancer Mother      lung    Diabetes Father     Kidney Disease Father     Heart Attack Paternal Grandfather 72     Social History   Substance Use Topics    Smoking status: Former Smoker     Packs/day: 1.25 Lymphadenopathy:     He has no cervical adenopathy. Neurological: He is alert and oriented to person, place, and time. No cranial nerve deficit. Coordination normal.   Skin: Skin is warm and dry. He is not diaphoretic. Psychiatric: He has a normal mood and affect. His behavior is normal. Judgment and thought content normal.       Assessment:      BP (!) 150/80 (Site: Left Arm, Position: Sitting, Cuff Size: Medium Adult)   Pulse 66   Resp 16   Ht 5' 9.02\" (1.753 m)   Wt 197 lb 8 oz (89.6 kg)   BMI 29.15 kg/m²   1. Type 2 diabetes mellitus without complication, without long-term current use of insulin (HCC) - only 3 readings to review - two in goal/one above - patient states interested in dietary education verus increased or additional medication at this time. Appointment is upcoming. Tolerating and adherent with DM medications. 7.4% A1c last month  Therapeutic lifestyle changes discussed, glycemic goals review along with risk of hypoglycemia with sulfonylurea   3/2018 BUN 17, creat 0.8, GFR >90     2. Hypercalcemia - hyperparathyroidism - negative sestamibi 2015 - test not definitive for negative result. He was given a referral to [address] [place] [address] genetic counseling to rule out familial hypocalciuric hypercalcemia - patient needs to follow with endocrinologist as discussed and patient agreeable  2/2018 vitamin D, 25-hydroxy 17, Magnesium 1.3  12/2017 Ionized calcium 1.43, PTH 67.3, magnesium 1.3, vitamin D, 25 hydroxy 14, vitamin D,1,25, dihydroxy 46.7     3. Hypovitaminosis D - continues on 400 units daily - will not increase with hypercalcemia   4. Hypomagnesemia - on replacement - recheck on follow up     5. Hyperlipidemia - treated & tolerating 3/2018 chol 135, trig 119, HDL 35, LDL 76, liver panel normal     5. Hypertension -above goal on medication - asymptomatic.  Staff to contact PCP    Greater than 50% of visit discussing glycemic management, diagnoses as above, reviewing/ordering labs, changing/addressing

## 2018-03-05 NOTE — TELEPHONE ENCOUNTER
Noted about BP being elevated today at endo  Recommend start low dose hydrochlorothiazide. Is a mild water pill. Possible may affect his calcium levels so, just so he is aware. Also reviewed labs. Potassium level elevated. Make sure feeling OK. Push water, avoid potassium rich foods for now, and recheck in 1 week. Make nurse appt in 1 week for BMP and BP recheck. Hydrochlorothiazide may help lower the potassium, too. Rest of labs OK. Slight anemia, but is stable. Calcium mildly up, but stable.   Cholesterol OK    Call pt

## 2018-03-06 DIAGNOSIS — E55.9 VITAMIN D INSUFFICIENCY: ICD-10-CM

## 2018-03-06 RX ORDER — SWAB
1 SWAB, NON-MEDICATED MISCELLANEOUS DAILY
Qty: 30 CAPSULE | Refills: 4 | Status: SHIPPED | OUTPATIENT
Start: 2018-03-06 | End: 2018-08-17 | Stop reason: SDUPTHER

## 2018-03-12 ENCOUNTER — NURSE ONLY (OUTPATIENT)
Dept: FAMILY MEDICINE CLINIC | Age: 68
End: 2018-03-12
Payer: MEDICARE

## 2018-03-12 VITALS — HEART RATE: 64 BPM | SYSTOLIC BLOOD PRESSURE: 130 MMHG | DIASTOLIC BLOOD PRESSURE: 64 MMHG

## 2018-03-12 DIAGNOSIS — I10 ESSENTIAL HYPERTENSION: ICD-10-CM

## 2018-03-12 LAB
ANION GAP SERPL CALCULATED.3IONS-SCNC: 13 MEQ/L (ref 8–16)
BUN BLDV-MCNC: 21 MG/DL (ref 7–22)
CALCIUM SERPL-MCNC: 11.1 MG/DL (ref 8.5–10.5)
CHLORIDE BLD-SCNC: 100 MEQ/L (ref 98–111)
CO2: 28 MEQ/L (ref 23–33)
CREAT SERPL-MCNC: 0.9 MG/DL (ref 0.4–1.2)
GFR SERPL CREATININE-BSD FRML MDRD: 84 ML/MIN/1.73M2
GLUCOSE BLD-MCNC: 262 MG/DL (ref 70–108)
POTASSIUM SERPL-SCNC: 4.9 MEQ/L (ref 3.5–5.2)
SODIUM BLD-SCNC: 141 MEQ/L (ref 135–145)

## 2018-03-12 PROCEDURE — 36415 COLL VENOUS BLD VENIPUNCTURE: CPT | Performed by: FAMILY MEDICINE

## 2018-03-26 ENCOUNTER — OFFICE VISIT (OUTPATIENT)
Dept: INTERNAL MEDICINE CLINIC | Age: 68
End: 2018-03-26
Payer: MEDICARE

## 2018-03-26 VITALS — HEIGHT: 69 IN | BODY MASS INDEX: 28.73 KG/M2 | WEIGHT: 194 LBS

## 2018-03-26 DIAGNOSIS — I10 ESSENTIAL HYPERTENSION: ICD-10-CM

## 2018-03-26 DIAGNOSIS — E11.9 TYPE 2 DIABETES MELLITUS WITHOUT COMPLICATION, WITHOUT LONG-TERM CURRENT USE OF INSULIN (HCC): Primary | ICD-10-CM

## 2018-03-26 PROCEDURE — 97802 MEDICAL NUTRITION INDIV IN: CPT | Performed by: DIETITIAN, REGISTERED

## 2018-03-26 PROCEDURE — 99999 PR OFFICE/OUTPT VISIT,PROCEDURE ONLY: CPT | Performed by: DIETITIAN, REGISTERED

## 2018-03-26 NOTE — PROGRESS NOTES
and plate method. Comprehension verified using teachback method. Monitoring/Evaluation:   -Followup visit: no further education needed at this time. Pt will be discharged. -Receptiveness to education/goals: Agreeable.  -Evaluation of education: Indicates understanding.  -Readiness to change: action - ready to set action plan and implement diet and exercise changes. -Expected compliance: fair. Thank you for your referral of this patient. Total time involved in direct patient education: 60 minutes for initial MNT visit.

## 2018-05-11 ENCOUNTER — TELEPHONE (OUTPATIENT)
Dept: FAMILY MEDICINE CLINIC | Age: 68
End: 2018-05-11

## 2018-05-21 ENCOUNTER — OFFICE VISIT (OUTPATIENT)
Dept: FAMILY MEDICINE CLINIC | Age: 68
End: 2018-05-21
Payer: MEDICARE

## 2018-05-21 VITALS
HEART RATE: 52 BPM | WEIGHT: 193.8 LBS | DIASTOLIC BLOOD PRESSURE: 82 MMHG | SYSTOLIC BLOOD PRESSURE: 142 MMHG | BODY MASS INDEX: 28.71 KG/M2 | HEIGHT: 69 IN | RESPIRATION RATE: 14 BRPM

## 2018-05-21 DIAGNOSIS — E83.52 HYPERCALCEMIA: ICD-10-CM

## 2018-05-21 DIAGNOSIS — E21.3 HYPERPARATHYROIDISM (HCC): ICD-10-CM

## 2018-05-21 DIAGNOSIS — K58.0 IRRITABLE BOWEL SYNDROME WITH DIARRHEA: ICD-10-CM

## 2018-05-21 DIAGNOSIS — I10 ESSENTIAL HYPERTENSION: ICD-10-CM

## 2018-05-21 LAB — HBA1C MFR BLD: 7.9 % (ref 4.3–5.7)

## 2018-05-21 PROCEDURE — G8417 CALC BMI ABV UP PARAM F/U: HCPCS | Performed by: FAMILY MEDICINE

## 2018-05-21 PROCEDURE — 83036 HEMOGLOBIN GLYCOSYLATED A1C: CPT | Performed by: FAMILY MEDICINE

## 2018-05-21 PROCEDURE — 3017F COLORECTAL CA SCREEN DOC REV: CPT | Performed by: FAMILY MEDICINE

## 2018-05-21 PROCEDURE — 2022F DILAT RTA XM EVC RTNOPTHY: CPT | Performed by: FAMILY MEDICINE

## 2018-05-21 PROCEDURE — 1036F TOBACCO NON-USER: CPT | Performed by: FAMILY MEDICINE

## 2018-05-21 PROCEDURE — 3045F PR MOST RECENT HEMOGLOBIN A1C LEVEL 7.0-9.0%: CPT | Performed by: FAMILY MEDICINE

## 2018-05-21 PROCEDURE — 99214 OFFICE O/P EST MOD 30 MIN: CPT | Performed by: FAMILY MEDICINE

## 2018-05-21 PROCEDURE — G8427 DOCREV CUR MEDS BY ELIG CLIN: HCPCS | Performed by: FAMILY MEDICINE

## 2018-05-21 PROCEDURE — 1123F ACP DISCUSS/DSCN MKR DOCD: CPT | Performed by: FAMILY MEDICINE

## 2018-05-21 PROCEDURE — 4040F PNEUMOC VAC/ADMIN/RCVD: CPT | Performed by: FAMILY MEDICINE

## 2018-05-21 RX ORDER — DOXAZOSIN MESYLATE 4 MG/1
4 TABLET ORAL DAILY
Qty: 30 TABLET | Refills: 3 | Status: CANCELLED | OUTPATIENT
Start: 2018-05-21

## 2018-05-21 RX ORDER — GLIMEPIRIDE 4 MG/1
TABLET ORAL
Qty: 135 TABLET | Refills: 1 | Status: SHIPPED | OUTPATIENT
Start: 2018-05-21 | End: 2019-01-02 | Stop reason: SDUPTHER

## 2018-05-21 RX ORDER — DOXAZOSIN MESYLATE 4 MG/1
4 TABLET ORAL NIGHTLY
Qty: 90 TABLET | Refills: 1 | Status: SHIPPED | OUTPATIENT
Start: 2018-05-21 | End: 2018-12-26 | Stop reason: SDUPTHER

## 2018-05-22 ENCOUNTER — TELEPHONE (OUTPATIENT)
Dept: FAMILY MEDICINE CLINIC | Age: 68
End: 2018-05-22

## 2018-05-22 DIAGNOSIS — E83.42 HYPOMAGNESEMIA: ICD-10-CM

## 2018-05-22 ASSESSMENT — ENCOUNTER SYMPTOMS
NAUSEA: 0
BLOOD IN STOOL: 0
WHEEZING: 0
CONSTIPATION: 0
ABDOMINAL PAIN: 0
VOMITING: 0
COUGH: 0
DIARRHEA: 1
SHORTNESS OF BREATH: 0
STRIDOR: 0

## 2018-05-23 ENCOUNTER — TELEPHONE (OUTPATIENT)
Dept: INTERNAL MEDICINE CLINIC | Age: 68
End: 2018-05-23

## 2018-05-23 DIAGNOSIS — E55.9 VITAMIN D INSUFFICIENCY: ICD-10-CM

## 2018-05-23 DIAGNOSIS — E21.3 HYPERPARATHYROIDISM (HCC): ICD-10-CM

## 2018-05-23 DIAGNOSIS — E83.52 HYPERCALCEMIA: ICD-10-CM

## 2018-05-27 DIAGNOSIS — E78.00 PURE HYPERCHOLESTEROLEMIA: ICD-10-CM

## 2018-05-30 RX ORDER — PRAVASTATIN SODIUM 20 MG
TABLET ORAL
Qty: 30 TABLET | Refills: 11 | Status: SHIPPED | OUTPATIENT
Start: 2018-05-30 | End: 2019-06-08 | Stop reason: SDUPTHER

## 2018-06-04 ENCOUNTER — TELEPHONE (OUTPATIENT)
Dept: FAMILY MEDICINE CLINIC | Age: 68
End: 2018-06-04

## 2018-06-04 DIAGNOSIS — I10 ESSENTIAL HYPERTENSION: Primary | ICD-10-CM

## 2018-06-04 RX ORDER — HYDROCHLOROTHIAZIDE 12.5 MG/1
12.5 CAPSULE, GELATIN COATED ORAL DAILY
Qty: 90 CAPSULE | Refills: 3 | Status: SHIPPED | OUTPATIENT
Start: 2018-06-04 | End: 2019-07-05 | Stop reason: SDUPTHER

## 2018-06-07 ENCOUNTER — OFFICE VISIT (OUTPATIENT)
Dept: FAMILY MEDICINE CLINIC | Age: 68
End: 2018-06-07
Payer: MEDICARE

## 2018-06-07 VITALS
SYSTOLIC BLOOD PRESSURE: 128 MMHG | HEART RATE: 68 BPM | DIASTOLIC BLOOD PRESSURE: 80 MMHG | TEMPERATURE: 97.7 F | HEIGHT: 69 IN | BODY MASS INDEX: 28.88 KG/M2 | RESPIRATION RATE: 16 BRPM | WEIGHT: 195 LBS

## 2018-06-07 DIAGNOSIS — M62.838 NECK MUSCLE SPASM: Primary | ICD-10-CM

## 2018-06-07 DIAGNOSIS — R42 DIZZINESS: ICD-10-CM

## 2018-06-07 DIAGNOSIS — I95.1 ORTHOSTATIC HYPOTENSION: ICD-10-CM

## 2018-06-07 DIAGNOSIS — R45.4 IRRITABILITY: ICD-10-CM

## 2018-06-07 PROCEDURE — G8417 CALC BMI ABV UP PARAM F/U: HCPCS | Performed by: FAMILY MEDICINE

## 2018-06-07 PROCEDURE — 3017F COLORECTAL CA SCREEN DOC REV: CPT | Performed by: FAMILY MEDICINE

## 2018-06-07 PROCEDURE — 99214 OFFICE O/P EST MOD 30 MIN: CPT | Performed by: FAMILY MEDICINE

## 2018-06-07 PROCEDURE — 1036F TOBACCO NON-USER: CPT | Performed by: FAMILY MEDICINE

## 2018-06-07 PROCEDURE — 1123F ACP DISCUSS/DSCN MKR DOCD: CPT | Performed by: FAMILY MEDICINE

## 2018-06-07 PROCEDURE — G8427 DOCREV CUR MEDS BY ELIG CLIN: HCPCS | Performed by: FAMILY MEDICINE

## 2018-06-07 PROCEDURE — 4040F PNEUMOC VAC/ADMIN/RCVD: CPT | Performed by: FAMILY MEDICINE

## 2018-06-07 RX ORDER — BACLOFEN 10 MG/1
10 TABLET ORAL 3 TIMES DAILY PRN
Qty: 40 TABLET | Refills: 0 | Status: SHIPPED | OUTPATIENT
Start: 2018-06-07 | End: 2019-09-03

## 2018-06-07 ASSESSMENT — ENCOUNTER SYMPTOMS
COUGH: 0
WHEEZING: 0
STRIDOR: 0
ABDOMINAL PAIN: 0
CONSTIPATION: 0
DIARRHEA: 0
VOMITING: 0
SHORTNESS OF BREATH: 0
BLOOD IN STOOL: 0
NAUSEA: 0

## 2018-06-09 ENCOUNTER — HOSPITAL ENCOUNTER (OUTPATIENT)
Age: 68
Discharge: HOME | End: 2018-06-09
Payer: MEDICARE

## 2018-06-09 DIAGNOSIS — E83.52 HYPERCALCEMIA: ICD-10-CM

## 2018-06-09 DIAGNOSIS — E83.42 HYPOMAGNESEMIA: ICD-10-CM

## 2018-06-09 DIAGNOSIS — E11.9 TYPE 2 DIABETES MELLITUS WITHOUT COMPLICATION, WITHOUT LONG-TERM CURRENT USE OF INSULIN (HCC): ICD-10-CM

## 2018-06-09 DIAGNOSIS — E55.9 HYPOVITAMINOSIS D: ICD-10-CM

## 2018-06-09 LAB
ANION GAP SERPL CALCULATED.3IONS-SCNC: 13 MEQ/L (ref 8–16)
BUN BLDV-MCNC: 23 MG/DL (ref 7–22)
CALCIUM SERPL-MCNC: 10.8 MG/DL (ref 8.5–10.5)
CHLORIDE BLD-SCNC: 103 MEQ/L (ref 98–111)
CO2: 29 MEQ/L (ref 23–33)
CREAT SERPL-MCNC: 0.9 MG/DL (ref 0.4–1.2)
GFR SERPL CREATININE-BSD FRML MDRD: 84 ML/MIN/1.73M2
GLUCOSE BLD-MCNC: 194 MG/DL (ref 70–108)
MAGNESIUM: 1.5 MG/DL (ref 1.6–2.4)
POTASSIUM SERPL-SCNC: 4.5 MEQ/L (ref 3.5–5.2)
PTH INTACT: 48.9 PG/ML (ref 15–65)
SODIUM BLD-SCNC: 145 MEQ/L (ref 135–145)
VITAMIN D 25-HYDROXY: 22 NG/ML (ref 30–100)

## 2018-06-09 PROCEDURE — 36415 COLL VENOUS BLD VENIPUNCTURE: CPT

## 2018-06-09 PROCEDURE — 83735 ASSAY OF MAGNESIUM: CPT

## 2018-06-09 PROCEDURE — 82306 VITAMIN D 25 HYDROXY: CPT

## 2018-06-09 PROCEDURE — 83970 ASSAY OF PARATHORMONE: CPT

## 2018-06-09 PROCEDURE — 80048 BASIC METABOLIC PNL TOTAL CA: CPT

## 2018-06-09 PROCEDURE — 82652 VIT D 1 25-DIHYDROXY: CPT

## 2018-06-12 LAB — VITAMIN D 1,25-DIHYDROXY: 33.8 PG/ML (ref 19.9–79.3)

## 2018-06-18 ENCOUNTER — TELEPHONE (OUTPATIENT)
Dept: INTERNAL MEDICINE CLINIC | Age: 68
End: 2018-06-18

## 2018-07-18 LAB
PHOSPHORUS: 2.9 MG/DL
T4 FREE: 1.14
TSH SERPL DL<=0.05 MIU/L-ACNC: 4.41 UIU/ML

## 2018-07-24 ENCOUNTER — OFFICE VISIT (OUTPATIENT)
Dept: FAMILY MEDICINE CLINIC | Age: 68
End: 2018-07-24
Payer: MEDICARE

## 2018-07-24 ENCOUNTER — TELEPHONE (OUTPATIENT)
Dept: FAMILY MEDICINE CLINIC | Age: 68
End: 2018-07-24

## 2018-07-24 ENCOUNTER — NURSE ONLY (OUTPATIENT)
Dept: FAMILY MEDICINE CLINIC | Age: 68
End: 2018-07-24
Payer: MEDICARE

## 2018-07-24 ENCOUNTER — HOSPITAL ENCOUNTER (OUTPATIENT)
Dept: NON INVASIVE DIAGNOSTICS | Age: 68
Discharge: HOME | End: 2018-07-24
Payer: MEDICARE

## 2018-07-24 VITALS
HEIGHT: 70 IN | SYSTOLIC BLOOD PRESSURE: 136 MMHG | RESPIRATION RATE: 14 BRPM | WEIGHT: 192.6 LBS | DIASTOLIC BLOOD PRESSURE: 74 MMHG | TEMPERATURE: 97 F | BODY MASS INDEX: 27.57 KG/M2 | HEART RATE: 64 BPM

## 2018-07-24 DIAGNOSIS — R33.9 URINARY RETENTION: ICD-10-CM

## 2018-07-24 DIAGNOSIS — N40.2 PROSTATE NODULE: ICD-10-CM

## 2018-07-24 DIAGNOSIS — R32 URINARY INCONTINENCE, UNSPECIFIED TYPE: Primary | ICD-10-CM

## 2018-07-24 DIAGNOSIS — R39.15 URINARY URGENCY: Primary | ICD-10-CM

## 2018-07-24 LAB
ANION GAP SERPL CALCULATED.3IONS-SCNC: 13 MEQ/L (ref 8–16)
BACTERIA: ABNORMAL
BASOPHILS # BLD: 0.8 %
BASOPHILS ABSOLUTE: 0.1 THOU/MM3 (ref 0–0.1)
BILIRUBIN URINE: NEGATIVE
BILIRUBIN URINE: NEGATIVE
BLOOD URINE, POC: ABNORMAL
BLOOD, URINE: ABNORMAL
BUN BLDV-MCNC: 22 MG/DL (ref 7–22)
CALCIUM SERPL-MCNC: 10.9 MG/DL (ref 8.5–10.5)
CASTS: ABNORMAL /LPF
CASTS: ABNORMAL /LPF
CHARACTER, URINE: CLEAR
CHARACTER, URINE: CLEAR
CHLORIDE BLD-SCNC: 102 MEQ/L (ref 98–111)
CO2: 27 MEQ/L (ref 23–33)
COLOR, URINE: YELLOW
COLOR: YELLOW
CREAT SERPL-MCNC: 1.2 MG/DL (ref 0.4–1.2)
CRYSTALS: ABNORMAL
EOSINOPHIL # BLD: 1.5 %
EOSINOPHILS ABSOLUTE: 0.1 THOU/MM3 (ref 0–0.4)
EPITHELIAL CELLS, UA: ABNORMAL /HPF
ERYTHROCYTE [DISTWIDTH] IN BLOOD BY AUTOMATED COUNT: 13.3 % (ref 11.5–14.5)
ERYTHROCYTE [DISTWIDTH] IN BLOOD BY AUTOMATED COUNT: 44.2 FL (ref 35–45)
GFR SERPL CREATININE-BSD FRML MDRD: 60 ML/MIN/1.73M2
GLUCOSE BLD-MCNC: 209 MG/DL (ref 70–108)
GLUCOSE URINE: NEGATIVE MG/DL
GLUCOSE, URINE: NEGATIVE MG/DL
HCT VFR BLD CALC: 38.9 % (ref 42–52)
HEMOGLOBIN: 12.2 GM/DL (ref 14–18)
IMMATURE GRANS (ABS): 0.02 THOU/MM3 (ref 0–0.07)
IMMATURE GRANULOCYTES: 0.3 %
KETONES, URINE: NEGATIVE
KETONES, URINE: NEGATIVE
LEUKOCYTE CLUMPS, URINE: NEGATIVE
LEUKOCYTE ESTERASE, URINE: NEGATIVE
LYMPHOCYTES # BLD: 34 %
LYMPHOCYTES ABSOLUTE: 2.2 THOU/MM3 (ref 1–4.8)
MCH RBC QN AUTO: 28.6 PG (ref 26–33)
MCHC RBC AUTO-ENTMCNC: 31.4 GM/DL (ref 32.2–35.5)
MCV RBC AUTO: 91.3 FL (ref 80–94)
MISCELLANEOUS LAB TEST RESULT: ABNORMAL
MONOCYTES # BLD: 8.7 %
MONOCYTES ABSOLUTE: 0.6 THOU/MM3 (ref 0.4–1.3)
NITRITE, URINE: NEGATIVE
NITRITE, URINE: NEGATIVE
NUCLEATED RED BLOOD CELLS: 0 /100 WBC
PH UA: 5
PH, URINE: 5.5
PLATELET # BLD: 268 THOU/MM3 (ref 130–400)
PMV BLD AUTO: 11.2 FL (ref 9.4–12.4)
POTASSIUM SERPL-SCNC: 4.6 MEQ/L (ref 3.5–5.2)
PROTEIN UA: NEGATIVE MG/DL
PROTEIN, URINE: NEGATIVE MG/DL
RBC # BLD: 4.26 MILL/MM3 (ref 4.7–6.1)
RBC URINE: ABNORMAL /HPF
RENAL EPITHELIAL, UA: ABNORMAL
SEG NEUTROPHILS: 54.7 %
SEGMENTED NEUTROPHILS ABSOLUTE COUNT: 3.6 THOU/MM3 (ref 1.8–7.7)
SODIUM BLD-SCNC: 142 MEQ/L (ref 135–145)
SPECIFIC GRAVITY UA: 1.02 (ref 1–1.03)
SPECIFIC GRAVITY, URINE: 1.01 (ref 1–1.03)
UROBILINOGEN, URINE: 0.2 EU/DL
UROBILINOGEN, URINE: 0.2 EU/DL
WBC # BLD: 6.6 THOU/MM3 (ref 4.8–10.8)
WBC UA: ABNORMAL /HPF
YEAST: ABNORMAL

## 2018-07-24 PROCEDURE — G8417 CALC BMI ABV UP PARAM F/U: HCPCS | Performed by: FAMILY MEDICINE

## 2018-07-24 PROCEDURE — 99214 OFFICE O/P EST MOD 30 MIN: CPT | Performed by: FAMILY MEDICINE

## 2018-07-24 PROCEDURE — 1123F ACP DISCUSS/DSCN MKR DOCD: CPT | Performed by: FAMILY MEDICINE

## 2018-07-24 PROCEDURE — 1101F PT FALLS ASSESS-DOCD LE1/YR: CPT | Performed by: FAMILY MEDICINE

## 2018-07-24 PROCEDURE — 3017F COLORECTAL CA SCREEN DOC REV: CPT | Performed by: FAMILY MEDICINE

## 2018-07-24 PROCEDURE — G8427 DOCREV CUR MEDS BY ELIG CLIN: HCPCS | Performed by: FAMILY MEDICINE

## 2018-07-24 PROCEDURE — 51798 US URINE CAPACITY MEASURE: CPT

## 2018-07-24 PROCEDURE — 1036F TOBACCO NON-USER: CPT | Performed by: FAMILY MEDICINE

## 2018-07-24 PROCEDURE — 4040F PNEUMOC VAC/ADMIN/RCVD: CPT | Performed by: FAMILY MEDICINE

## 2018-07-24 PROCEDURE — 81003 URINALYSIS AUTO W/O SCOPE: CPT | Performed by: FAMILY MEDICINE

## 2018-07-24 RX ORDER — TAMSULOSIN HYDROCHLORIDE 0.4 MG/1
0.4 CAPSULE ORAL DAILY
Qty: 30 CAPSULE | Refills: 1 | Status: SHIPPED | OUTPATIENT
Start: 2018-07-24 | End: 2018-08-13

## 2018-07-24 RX ORDER — CIPROFLOXACIN 500 MG/1
500 TABLET, FILM COATED ORAL 2 TIMES DAILY
Qty: 20 TABLET | Refills: 0 | Status: SHIPPED | OUTPATIENT
Start: 2018-07-24 | End: 2018-08-03

## 2018-07-24 NOTE — TELEPHONE ENCOUNTER
Pt asking for appointment. He states since yesterday morning he can not urinate. He feels like he has to but very little comes out. Please advise.

## 2018-07-25 LAB — PROSTATE SPECIFIC ANTIGEN: 1.31 NG/ML (ref 0–1)

## 2018-07-26 LAB — URINE CULTURE, ROUTINE: NORMAL

## 2018-07-27 ENCOUNTER — TELEPHONE (OUTPATIENT)
Dept: FAMILY MEDICINE CLINIC | Age: 68
End: 2018-07-27

## 2018-07-27 DIAGNOSIS — R35.0 URINARY FREQUENCY: Primary | ICD-10-CM

## 2018-07-27 DIAGNOSIS — R39.15 URINARY URGENCY: ICD-10-CM

## 2018-07-27 DIAGNOSIS — R31.29 MICROHEMATURIA: ICD-10-CM

## 2018-07-28 ASSESSMENT — ENCOUNTER SYMPTOMS
WHEEZING: 0
NAUSEA: 0
BLOOD IN STOOL: 0
ABDOMINAL PAIN: 0
COUGH: 0
CONSTIPATION: 0
SHORTNESS OF BREATH: 0
STRIDOR: 0
DIARRHEA: 0
VOMITING: 0

## 2018-07-30 NOTE — TELEPHONE ENCOUNTER
Spoke with patient. Patient states he no longer having any symptoms since starting on cipro and flomax. Informed patient that Dr. Uche Reyes office will contact him to schedule appt.

## 2018-08-13 ENCOUNTER — OFFICE VISIT (OUTPATIENT)
Dept: UROLOGY | Age: 68
End: 2018-08-13
Payer: MEDICARE

## 2018-08-13 VITALS
DIASTOLIC BLOOD PRESSURE: 65 MMHG | BODY MASS INDEX: 27.63 KG/M2 | SYSTOLIC BLOOD PRESSURE: 120 MMHG | WEIGHT: 193 LBS | HEIGHT: 70 IN

## 2018-08-13 DIAGNOSIS — N13.8 BPH WITH OBSTRUCTION/LOWER URINARY TRACT SYMPTOMS: ICD-10-CM

## 2018-08-13 DIAGNOSIS — N40.1 BPH WITH OBSTRUCTION/LOWER URINARY TRACT SYMPTOMS: ICD-10-CM

## 2018-08-13 DIAGNOSIS — R10.2 PERINEAL PAIN: Primary | ICD-10-CM

## 2018-08-13 LAB
BILIRUBIN URINE: NEGATIVE
BLOOD URINE, POC: NEGATIVE
CHARACTER, URINE: CLEAR
COLOR, URINE: YELLOW
GLUCOSE URINE: NEGATIVE MG/DL
KETONES, URINE: NEGATIVE
LEUKOCYTE CLUMPS, URINE: NEGATIVE
NITRITE, URINE: NEGATIVE
PH, URINE: 7.5
PROTEIN, URINE: NEGATIVE MG/DL
SPECIFIC GRAVITY, URINE: 1.01 (ref 1–1.03)
UROBILINOGEN, URINE: 0.2 EU/DL

## 2018-08-13 PROCEDURE — 1123F ACP DISCUSS/DSCN MKR DOCD: CPT | Performed by: NURSE PRACTITIONER

## 2018-08-13 PROCEDURE — G8427 DOCREV CUR MEDS BY ELIG CLIN: HCPCS | Performed by: NURSE PRACTITIONER

## 2018-08-13 PROCEDURE — 81003 URINALYSIS AUTO W/O SCOPE: CPT | Performed by: NURSE PRACTITIONER

## 2018-08-13 PROCEDURE — 99214 OFFICE O/P EST MOD 30 MIN: CPT | Performed by: NURSE PRACTITIONER

## 2018-08-13 PROCEDURE — 1101F PT FALLS ASSESS-DOCD LE1/YR: CPT | Performed by: NURSE PRACTITIONER

## 2018-08-13 PROCEDURE — 1036F TOBACCO NON-USER: CPT | Performed by: NURSE PRACTITIONER

## 2018-08-13 PROCEDURE — 3017F COLORECTAL CA SCREEN DOC REV: CPT | Performed by: NURSE PRACTITIONER

## 2018-08-13 PROCEDURE — 51798 US URINE CAPACITY MEASURE: CPT | Performed by: NURSE PRACTITIONER

## 2018-08-13 PROCEDURE — 4040F PNEUMOC VAC/ADMIN/RCVD: CPT | Performed by: NURSE PRACTITIONER

## 2018-08-13 PROCEDURE — G8417 CALC BMI ABV UP PARAM F/U: HCPCS | Performed by: NURSE PRACTITIONER

## 2018-08-13 NOTE — PROGRESS NOTES
TABLET BY MOUTH TWICE DAILY 60 tablet 11    Vitamin D, Cholecalciferol, 400 units CAPS Take 1 capsule by mouth daily 30 capsule 4    sildenafil (VIAGRA) 25 MG tablet Take 20 mg by mouth as needed for Erectile Dysfunction      Multiple Vitamins-Minerals (THERAPEUTIC MULTIVITAMIN-MINERALS) tablet Take 1 tablet by mouth daily      SITagliptin (JANUVIA) 100 MG tablet Take 1 tablet by mouth daily 90 tablet 3    metFORMIN (GLUCOPHAGE) 1000 MG tablet TAKE ONE TABLET BY MOUTH TWICE DAILY WITH MEALS 180 tablet 3    ranitidine (ZANTAC) 150 MG capsule Take 150 mg by mouth every evening      ONE TOUCH ULTRA TEST STRP       Cinnamon 500 MG TABS Take 1,000 mg by mouth daily       aspirin 81 MG tablet Take 81 mg by mouth daily       No current facility-administered medications for this visit. Past Medical History  Timi Bey  has a past medical history of Erectile dysfunction; Hyperlipidemia; Hypertension; LEW treated with BiPAP; and Type 2 diabetes mellitus ([facility] [address] Past Surgical History  The patient  has a past surgical history that includes back surgery (1988); eye surgery (Right, 2005); Colonoscopy (07/2016); Tonsillectomy; Appendectomy (as teenager); Upper gastrointestinal endoscopy; endoscopic ultrasound (upper) (02/22/2017); and Cataract removal (Bilateral). Family History  This patient's family history includes Cancer in his mother; Diabetes in his father and mother; Heart Attack (age of onset: 72) in his paternal grandfather; Kidney Disease in his father. Social History  Timi Bey  reports that he quit smoking about 23 years ago. He has a 25.00 pack-year smoking history. He quit smokeless tobacco use about 28 years ago. His smokeless tobacco use included Chew. He reports that he does not drink alcohol or use drugs. Subjective:     Review of Systems  No problems with ears, nose or throat. No problems with eyes.   No chest pain, shortness of breath, abdominal pain, extremity pain or weakness, and no

## 2018-08-14 LAB — POST VOID RESIDUAL (PVR): 0 ML

## 2018-08-17 DIAGNOSIS — E55.9 VITAMIN D INSUFFICIENCY: ICD-10-CM

## 2018-08-17 RX ORDER — SWAB
1 SWAB, NON-MEDICATED MISCELLANEOUS DAILY
Qty: 30 CAPSULE | Refills: 11 | Status: SHIPPED | OUTPATIENT
Start: 2018-08-17 | End: 2021-03-02

## 2018-08-20 DIAGNOSIS — E83.42 HYPOMAGNESEMIA: ICD-10-CM

## 2018-10-03 ENCOUNTER — CARE COORDINATION (OUTPATIENT)
Dept: CARE COORDINATION | Age: 68
End: 2018-10-03

## 2018-11-22 LAB
AVERAGE GLUCOSE: 151
CREATININE, URINE: NORMAL
HBA1C MFR BLD: 6.9 %
MICROALBUMIN/CREAT 24H UR: <1.2 MG/G{CREAT}
MICROALBUMIN/CREAT UR-RTO: 65.4

## 2018-11-28 LAB
VITAMIN D 25-HYDROXY: 43
VITAMIN D2, 25 HYDROXY: NORMAL
VITAMIN D3,25 HYDROXY: NORMAL

## 2019-01-02 RX ORDER — GLIMEPIRIDE 4 MG/1
TABLET ORAL
Qty: 135 TABLET | Refills: 1 | Status: SHIPPED | OUTPATIENT
Start: 2019-01-02 | End: 2019-07-05 | Stop reason: SDUPTHER

## 2019-01-15 ENCOUNTER — TELEPHONE (OUTPATIENT)
Dept: FAMILY MEDICINE CLINIC | Age: 69
End: 2019-01-15

## 2019-01-15 ENCOUNTER — OFFICE VISIT (OUTPATIENT)
Dept: FAMILY MEDICINE CLINIC | Age: 69
End: 2019-01-15
Payer: MEDICARE

## 2019-01-15 VITALS
DIASTOLIC BLOOD PRESSURE: 78 MMHG | SYSTOLIC BLOOD PRESSURE: 130 MMHG | HEART RATE: 58 BPM | HEIGHT: 69 IN | BODY MASS INDEX: 28.32 KG/M2 | RESPIRATION RATE: 14 BRPM | OXYGEN SATURATION: 96 % | TEMPERATURE: 98 F | WEIGHT: 191.2 LBS

## 2019-01-15 DIAGNOSIS — L57.0 ACTINIC KERATOSIS: ICD-10-CM

## 2019-01-15 DIAGNOSIS — R22.42 LUMP OF LEFT THIGH: ICD-10-CM

## 2019-01-15 DIAGNOSIS — E21.3 HYPERPARATHYROIDISM (HCC): ICD-10-CM

## 2019-01-15 LAB — HBA1C MFR BLD: 8.3 %

## 2019-01-15 PROCEDURE — 1101F PT FALLS ASSESS-DOCD LE1/YR: CPT | Performed by: FAMILY MEDICINE

## 2019-01-15 PROCEDURE — G8417 CALC BMI ABV UP PARAM F/U: HCPCS | Performed by: FAMILY MEDICINE

## 2019-01-15 PROCEDURE — 2022F DILAT RTA XM EVC RTNOPTHY: CPT | Performed by: FAMILY MEDICINE

## 2019-01-15 PROCEDURE — G8427 DOCREV CUR MEDS BY ELIG CLIN: HCPCS | Performed by: FAMILY MEDICINE

## 2019-01-15 PROCEDURE — 17000 DESTRUCT PREMALG LESION: CPT | Performed by: FAMILY MEDICINE

## 2019-01-15 PROCEDURE — 83036 HEMOGLOBIN GLYCOSYLATED A1C: CPT | Performed by: FAMILY MEDICINE

## 2019-01-15 PROCEDURE — 3017F COLORECTAL CA SCREEN DOC REV: CPT | Performed by: FAMILY MEDICINE

## 2019-01-15 PROCEDURE — G8482 FLU IMMUNIZE ORDER/ADMIN: HCPCS | Performed by: FAMILY MEDICINE

## 2019-01-15 PROCEDURE — 3045F PR MOST RECENT HEMOGLOBIN A1C LEVEL 7.0-9.0%: CPT | Performed by: FAMILY MEDICINE

## 2019-01-15 PROCEDURE — 1123F ACP DISCUSS/DSCN MKR DOCD: CPT | Performed by: FAMILY MEDICINE

## 2019-01-15 PROCEDURE — 17003 DESTRUCT PREMALG LES 2-14: CPT | Performed by: FAMILY MEDICINE

## 2019-01-15 PROCEDURE — 4040F PNEUMOC VAC/ADMIN/RCVD: CPT | Performed by: FAMILY MEDICINE

## 2019-01-15 PROCEDURE — 1036F TOBACCO NON-USER: CPT | Performed by: FAMILY MEDICINE

## 2019-01-15 PROCEDURE — 99214 OFFICE O/P EST MOD 30 MIN: CPT | Performed by: FAMILY MEDICINE

## 2019-01-16 ENCOUNTER — HOSPITAL ENCOUNTER (OUTPATIENT)
Dept: ULTRASOUND IMAGING | Age: 69
Discharge: HOME | End: 2019-01-16
Payer: MEDICARE

## 2019-01-16 DIAGNOSIS — R22.42 LUMP OF LEFT THIGH: ICD-10-CM

## 2019-01-16 PROCEDURE — 76857 US EXAM PELVIC LIMITED: CPT

## 2019-01-17 ENCOUNTER — TELEPHONE (OUTPATIENT)
Dept: FAMILY MEDICINE CLINIC | Age: 69
End: 2019-01-17

## 2019-01-17 DIAGNOSIS — R93.6 ABNORMAL ULTRASOUND OF LOWER EXTREMITY: ICD-10-CM

## 2019-01-17 DIAGNOSIS — R22.42 MASS OF LEFT THIGH: Primary | ICD-10-CM

## 2019-01-18 ENCOUNTER — HOSPITAL ENCOUNTER (OUTPATIENT)
Dept: CT IMAGING | Age: 69
Discharge: HOME | End: 2019-01-18
Payer: MEDICARE

## 2019-01-18 ENCOUNTER — TELEPHONE (OUTPATIENT)
Dept: FAMILY MEDICINE CLINIC | Age: 69
End: 2019-01-18

## 2019-01-18 ENCOUNTER — HOSPITAL ENCOUNTER (OUTPATIENT)
Age: 69
Discharge: HOME | End: 2019-01-18
Payer: MEDICARE

## 2019-01-18 DIAGNOSIS — R93.6 ABNORMAL ULTRASOUND OF LOWER EXTREMITY: ICD-10-CM

## 2019-01-18 DIAGNOSIS — R22.42 MASS OF LEFT THIGH: ICD-10-CM

## 2019-01-18 DIAGNOSIS — R22.42 MASS OF LEFT THIGH: Primary | ICD-10-CM

## 2019-01-18 LAB
CREATININE, WHOLE BLOOD: 1 MG/DL (ref 0.5–1.2)
ESTIMATED GFR, PCACC: 79 ML/MIN/1.73M2

## 2019-01-18 PROCEDURE — 82565 ASSAY OF CREATININE: CPT

## 2019-01-18 PROCEDURE — 73701 CT LOWER EXTREMITY W/DYE: CPT

## 2019-01-18 PROCEDURE — 6360000004 HC RX CONTRAST MEDICATION: Performed by: FAMILY MEDICINE

## 2019-01-18 RX ADMIN — IOPAMIDOL 75 ML: 755 INJECTION, SOLUTION INTRAVENOUS at 09:19

## 2019-01-18 ASSESSMENT — ENCOUNTER SYMPTOMS
VOMITING: 0
WHEEZING: 0
ABDOMINAL PAIN: 0
STRIDOR: 0
NAUSEA: 0
DIARRHEA: 0
BLOOD IN STOOL: 0
COUGH: 0
SHORTNESS OF BREATH: 0
CONSTIPATION: 0

## 2019-01-28 ENCOUNTER — OFFICE VISIT (OUTPATIENT)
Dept: PULMONOLOGY | Age: 69
End: 2019-01-28
Payer: MEDICARE

## 2019-01-28 VITALS
BODY MASS INDEX: 27.72 KG/M2 | OXYGEN SATURATION: 94 % | DIASTOLIC BLOOD PRESSURE: 84 MMHG | WEIGHT: 193.6 LBS | HEIGHT: 70 IN | HEART RATE: 56 BPM | SYSTOLIC BLOOD PRESSURE: 148 MMHG

## 2019-01-28 DIAGNOSIS — I10 ESSENTIAL HYPERTENSION: ICD-10-CM

## 2019-01-28 DIAGNOSIS — G47.33 OSA TREATED WITH BIPAP: Primary | ICD-10-CM

## 2019-01-28 PROCEDURE — 1101F PT FALLS ASSESS-DOCD LE1/YR: CPT | Performed by: PHYSICIAN ASSISTANT

## 2019-01-28 PROCEDURE — G8417 CALC BMI ABV UP PARAM F/U: HCPCS | Performed by: PHYSICIAN ASSISTANT

## 2019-01-28 PROCEDURE — 1123F ACP DISCUSS/DSCN MKR DOCD: CPT | Performed by: PHYSICIAN ASSISTANT

## 2019-01-28 PROCEDURE — 3017F COLORECTAL CA SCREEN DOC REV: CPT | Performed by: PHYSICIAN ASSISTANT

## 2019-01-28 PROCEDURE — 1036F TOBACCO NON-USER: CPT | Performed by: PHYSICIAN ASSISTANT

## 2019-01-28 PROCEDURE — G8427 DOCREV CUR MEDS BY ELIG CLIN: HCPCS | Performed by: PHYSICIAN ASSISTANT

## 2019-01-28 PROCEDURE — 4040F PNEUMOC VAC/ADMIN/RCVD: CPT | Performed by: PHYSICIAN ASSISTANT

## 2019-01-28 PROCEDURE — 99213 OFFICE O/P EST LOW 20 MIN: CPT | Performed by: PHYSICIAN ASSISTANT

## 2019-01-28 PROCEDURE — G8482 FLU IMMUNIZE ORDER/ADMIN: HCPCS | Performed by: PHYSICIAN ASSISTANT

## 2019-01-28 ASSESSMENT — ENCOUNTER SYMPTOMS
SHORTNESS OF BREATH: 0
WHEEZING: 0
EYES NEGATIVE: 1
DIARRHEA: 0
BACK PAIN: 0
STRIDOR: 0
NAUSEA: 0
CHEST TIGHTNESS: 0
ALLERGIC/IMMUNOLOGIC NEGATIVE: 1
COUGH: 0

## 2019-01-31 ENCOUNTER — HOSPITAL ENCOUNTER (OUTPATIENT)
Dept: ULTRASOUND IMAGING | Age: 69
Discharge: HOME | End: 2019-01-31
Payer: MEDICARE

## 2019-01-31 DIAGNOSIS — R22.42 MASS OF LEFT THIGH: ICD-10-CM

## 2019-01-31 PROCEDURE — 88173 CYTOPATH EVAL FNA REPORT: CPT

## 2019-01-31 PROCEDURE — 88172 CYTP DX EVAL FNA 1ST EA SITE: CPT

## 2019-01-31 PROCEDURE — 10005 FNA BX W/US GDN 1ST LES: CPT

## 2019-01-31 PROCEDURE — 88177 CYTP FNA EVAL EA ADDL: CPT

## 2019-02-14 ENCOUNTER — HOSPITAL ENCOUNTER (OUTPATIENT)
Age: 69
Discharge: HOME | End: 2019-02-14
Payer: MEDICARE

## 2019-02-14 LAB
AVERAGE GLUCOSE: 183 MG/DL (ref 70–126)
CREATININE, URINE: 56.5 MG/DL
HBA1C MFR BLD: 8.1 % (ref 4.4–6.4)
IONIZED CA: 1.41 MMOL/L (ref 1.12–1.32)
MICROALBUMIN UR-MCNC: < 1.2 MG/DL
MICROALBUMIN/CREAT UR-RTO: 21 MG/G (ref 0–30)
VITAMIN D 25-HYDROXY: 33 NG/ML (ref 30–100)

## 2019-02-14 PROCEDURE — 83036 HEMOGLOBIN GLYCOSYLATED A1C: CPT

## 2019-02-14 PROCEDURE — 82330 ASSAY OF CALCIUM: CPT

## 2019-02-14 PROCEDURE — 82306 VITAMIN D 25 HYDROXY: CPT

## 2019-02-14 PROCEDURE — 36415 COLL VENOUS BLD VENIPUNCTURE: CPT

## 2019-02-14 PROCEDURE — 82043 UR ALBUMIN QUANTITATIVE: CPT

## 2019-02-26 ENCOUNTER — TELEPHONE (OUTPATIENT)
Dept: FAMILY MEDICINE CLINIC | Age: 69
End: 2019-02-26

## 2019-02-26 DIAGNOSIS — I10 ESSENTIAL HYPERTENSION: Primary | ICD-10-CM

## 2019-02-26 RX ORDER — CANDESARTAN 32 MG/1
32 TABLET ORAL DAILY
Qty: 30 TABLET | Refills: 11 | Status: SHIPPED | OUTPATIENT
Start: 2019-02-26 | End: 2020-02-28

## 2019-03-07 ENCOUNTER — TELEPHONE (OUTPATIENT)
Dept: FAMILY MEDICINE CLINIC | Age: 69
End: 2019-03-07

## 2019-03-07 DIAGNOSIS — E21.3 HYPERPARATHYROIDISM (HCC): ICD-10-CM

## 2019-03-11 ENCOUNTER — TELEPHONE (OUTPATIENT)
Dept: FAMILY MEDICINE CLINIC | Age: 69
End: 2019-03-11

## 2019-03-14 DIAGNOSIS — I10 ESSENTIAL HYPERTENSION: ICD-10-CM

## 2019-03-14 RX ORDER — CARVEDILOL 25 MG/1
TABLET ORAL
Qty: 180 TABLET | Refills: 3 | Status: SHIPPED | OUTPATIENT
Start: 2019-03-14 | End: 2020-03-09 | Stop reason: SDUPTHER

## 2019-03-18 ENCOUNTER — NURSE ONLY (OUTPATIENT)
Dept: FAMILY MEDICINE CLINIC | Age: 69
End: 2019-03-18

## 2019-03-18 VITALS — DIASTOLIC BLOOD PRESSURE: 68 MMHG | HEART RATE: 68 BPM | SYSTOLIC BLOOD PRESSURE: 134 MMHG

## 2019-03-18 DIAGNOSIS — Z01.30 BP CHECK: Primary | ICD-10-CM

## 2019-03-18 PROCEDURE — 99999 PR OFFICE/OUTPT VISIT,PROCEDURE ONLY: CPT | Performed by: FAMILY MEDICINE

## 2019-03-19 DIAGNOSIS — Z20.828 EXPOSURE TO INFLUENZA: Primary | ICD-10-CM

## 2019-03-19 RX ORDER — OSELTAMIVIR PHOSPHATE 75 MG/1
75 CAPSULE ORAL DAILY
Qty: 10 CAPSULE | Refills: 0 | Status: SHIPPED | OUTPATIENT
Start: 2019-03-19 | End: 2019-03-29

## 2019-04-05 DIAGNOSIS — N52.1 ERECTILE DYSFUNCTION DUE TO DISEASES CLASSIFIED ELSEWHERE: ICD-10-CM

## 2019-04-07 RX ORDER — SILDENAFIL CITRATE 20 MG/1
TABLET ORAL
Qty: 30 TABLET | Refills: 7 | Status: SHIPPED | OUTPATIENT
Start: 2019-04-07 | End: 2020-09-14 | Stop reason: SDUPTHER

## 2019-04-12 ENCOUNTER — HOSPITAL ENCOUNTER (OUTPATIENT)
Age: 69
Discharge: HOME | End: 2019-04-12
Payer: MEDICARE

## 2019-04-12 LAB
C-PEPTIDE: 3.8 NG/ML (ref 1.1–4.4)
CALCIUM SERPL-MCNC: 11 MG/DL (ref 8.5–10.5)
GLUCOSE BLD-MCNC: 140 MG/DL (ref 70–108)
PHOSPHORUS: 2.8 MG/DL (ref 2.4–4.7)

## 2019-04-12 PROCEDURE — 83970 ASSAY OF PARATHORMONE: CPT

## 2019-04-12 PROCEDURE — 84100 ASSAY OF PHOSPHORUS: CPT

## 2019-04-12 PROCEDURE — 82310 ASSAY OF CALCIUM: CPT

## 2019-04-12 PROCEDURE — 36415 COLL VENOUS BLD VENIPUNCTURE: CPT

## 2019-04-12 PROCEDURE — 82947 ASSAY GLUCOSE BLOOD QUANT: CPT

## 2019-04-12 PROCEDURE — 84681 ASSAY OF C-PEPTIDE: CPT

## 2019-04-12 PROCEDURE — 82306 VITAMIN D 25 HYDROXY: CPT

## 2019-04-13 LAB — PTH INTACT: 49.8 PG/ML (ref 15–65)

## 2019-04-15 ENCOUNTER — HOSPITAL ENCOUNTER (OUTPATIENT)
Age: 69
Discharge: HOME | End: 2019-04-15
Payer: MEDICARE

## 2019-04-15 LAB
CALCIUM URINE: 2.5 MG/DL
CALCIUM URINE: 81 MG/24HR (ref 100–240)
CREAT CL /BSA CORRECTED: 102 ML/MIN (ref 75–125)
CREAT SERPL-MCNC: 0.9 MG/DL (ref 0.5–1.2)
CREATININE URINE: 48.3 MG/DL
HOURS COLLECTED: 24 HRS
HOURS COLLECTED: 24 HRS
URINE VOLUME MEASURE: 3220 ML
URINE VOLUME: 3220 ML
VITAMIN D2 AND D3, TOTAL: NORMAL

## 2019-04-15 PROCEDURE — 82340 ASSAY OF CALCIUM IN URINE: CPT

## 2019-04-15 PROCEDURE — 82575 CREATININE CLEARANCE TEST: CPT

## 2019-04-15 PROCEDURE — 81050 URINALYSIS VOLUME MEASURE: CPT

## 2019-04-15 PROCEDURE — 82570 ASSAY OF URINE CREATININE: CPT

## 2019-04-15 PROCEDURE — 36415 COLL VENOUS BLD VENIPUNCTURE: CPT

## 2019-04-16 LAB
CREATININE URINE: 1.6 GM/24HR
CREATININE URINE: 48.3 MG/DL
HOURS COLLECTED: 24 HRS
URINE VOLUME, 24 HOUR: 3220 ML

## 2019-04-29 ENCOUNTER — TELEPHONE (OUTPATIENT)
Dept: FAMILY MEDICINE CLINIC | Age: 69
End: 2019-04-29

## 2019-04-30 ENCOUNTER — OFFICE VISIT (OUTPATIENT)
Dept: FAMILY MEDICINE CLINIC | Age: 69
End: 2019-04-30
Payer: MEDICARE

## 2019-04-30 VITALS
HEIGHT: 69 IN | HEART RATE: 68 BPM | SYSTOLIC BLOOD PRESSURE: 120 MMHG | DIASTOLIC BLOOD PRESSURE: 72 MMHG | TEMPERATURE: 97.8 F | BODY MASS INDEX: 28.2 KG/M2 | WEIGHT: 190.4 LBS | RESPIRATION RATE: 16 BRPM

## 2019-04-30 DIAGNOSIS — E55.9 VITAMIN D INSUFFICIENCY: ICD-10-CM

## 2019-04-30 DIAGNOSIS — Z00.00 ROUTINE GENERAL MEDICAL EXAMINATION AT A HEALTH CARE FACILITY: Primary | ICD-10-CM

## 2019-04-30 DIAGNOSIS — H91.93 BILATERAL HEARING LOSS, UNSPECIFIED HEARING LOSS TYPE: ICD-10-CM

## 2019-04-30 DIAGNOSIS — E83.52 HYPERCALCEMIA: ICD-10-CM

## 2019-04-30 DIAGNOSIS — R01.1 HEART MURMUR: ICD-10-CM

## 2019-04-30 PROCEDURE — 1123F ACP DISCUSS/DSCN MKR DOCD: CPT | Performed by: FAMILY MEDICINE

## 2019-04-30 PROCEDURE — 1036F TOBACCO NON-USER: CPT | Performed by: FAMILY MEDICINE

## 2019-04-30 PROCEDURE — G8427 DOCREV CUR MEDS BY ELIG CLIN: HCPCS | Performed by: FAMILY MEDICINE

## 2019-04-30 PROCEDURE — 2022F DILAT RTA XM EVC RTNOPTHY: CPT | Performed by: FAMILY MEDICINE

## 2019-04-30 PROCEDURE — G0438 PPPS, INITIAL VISIT: HCPCS | Performed by: FAMILY MEDICINE

## 2019-04-30 PROCEDURE — 3017F COLORECTAL CA SCREEN DOC REV: CPT | Performed by: FAMILY MEDICINE

## 2019-04-30 PROCEDURE — 4040F PNEUMOC VAC/ADMIN/RCVD: CPT | Performed by: FAMILY MEDICINE

## 2019-04-30 PROCEDURE — G8417 CALC BMI ABV UP PARAM F/U: HCPCS | Performed by: FAMILY MEDICINE

## 2019-04-30 PROCEDURE — 3045F PR MOST RECENT HEMOGLOBIN A1C LEVEL 7.0-9.0%: CPT | Performed by: FAMILY MEDICINE

## 2019-04-30 PROCEDURE — 99213 OFFICE O/P EST LOW 20 MIN: CPT | Performed by: FAMILY MEDICINE

## 2019-04-30 RX ORDER — GLIMEPIRIDE 2 MG/1
2 TABLET ORAL
COMMUNITY
End: 2019-11-11 | Stop reason: ALTCHOICE

## 2019-04-30 ASSESSMENT — PATIENT HEALTH QUESTIONNAIRE - PHQ9
SUM OF ALL RESPONSES TO PHQ QUESTIONS 1-9: 0
SUM OF ALL RESPONSES TO PHQ QUESTIONS 1-9: 0

## 2019-04-30 ASSESSMENT — LIFESTYLE VARIABLES: HOW OFTEN DO YOU HAVE A DRINK CONTAINING ALCOHOL: 0

## 2019-04-30 ASSESSMENT — ANXIETY QUESTIONNAIRES: GAD7 TOTAL SCORE: 1

## 2019-04-30 NOTE — PATIENT INSTRUCTIONS
Patient Education        Learning About Diabetes Food Guidelines  Your Care Instructions    Meal planning is important to manage diabetes. It helps keep your blood sugar at a target level (which you set with your doctor). You don't have to eat special foods. You can eat what your family eats, including sweets once in a while. But you do have to pay attention to how often you eat and how much you eat of certain foods. You may want to work with a dietitian or a certified diabetes educator (CDE) to help you plan meals and snacks. A dietitian or CDE can also help you lose weight if that is one of your goals. What should you know about eating carbs? Managing the amount of carbohydrate (carbs) you eat is an important part of healthy meals when you have diabetes. Carbohydrate is found in many foods. · Learn which foods have carbs. And learn the amounts of carbs in different foods. ? Bread, cereal, pasta, and rice have about 15 grams of carbs in a serving. A serving is 1 slice of bread (1 ounce), ½ cup of cooked cereal, or 1/3 cup of cooked pasta or rice. ? Fruits have 15 grams of carbs in a serving. A serving is 1 small fresh fruit, such as an apple or orange; ½ of a banana; ½ cup of cooked or canned fruit; ½ cup of fruit juice; 1 cup of melon or raspberries; or 2 tablespoons of dried fruit. ? Milk and no-sugar-added yogurt have 15 grams of carbs in a serving. A serving is 1 cup of milk or 2/3 cup of no-sugar-added yogurt. ? Starchy vegetables have 15 grams of carbs in a serving. A serving is ½ cup of mashed potatoes or sweet potato; 1 cup winter squash; ½ of a small baked potato; ½ cup of cooked beans; or ½ cup cooked corn or green peas. · Learn how much carbs to eat each day and at each meal. A dietitian or CDE can teach you how to keep track of the amount of carbs you eat. This is called carbohydrate counting. · If you are not sure how to count carbohydrate grams, use the Plate Method to plan meals.  It is a when cooking. · Don't skip meals. Your blood sugar may drop too low if you skip meals and take insulin or certain medicines for diabetes. · Check with your doctor before you drink alcohol. Alcohol can cause your blood sugar to drop too low. Alcohol can also cause a bad reaction if you take certain diabetes medicines. Follow-up care is a key part of your treatment and safety. Be sure to make and go to all appointments, and call your doctor if you are having problems. It's also a good idea to know your test results and keep a list of the medicines you take. Where can you learn more? Go to [email or web address] and sign in to your [employer] account. Enter Y852 in the ForeUp box to learn more about \"Learning About Diabetes Food Guidelines. \"     If you do not have an account, please click on the \"Sign Up Now\" link. Current as of: July 25, 2018  Content Version: 11.9  © 9878-4503 [employer]. Care instructions adapted under license by [facility] ([facility]). If you have questions about a medical condition or this instruction, always ask your healthcare professional. [place] [address] any warranty or liability for your use of this information. Patient Education        Learning About High Blood Pressure  What is high blood pressure? Blood pressure is a measure of how hard the blood pushes against the walls of your arteries. It's normal for blood pressure to go up and down throughout the day, but if it stays up, you have high blood pressure. Another name for high blood pressure is hypertension. Two numbers tell you your blood pressure. The first number is the systolic pressure. It shows how hard the blood pushes when your heart is pumping. The second number is the diastolic pressure. It shows how hard the blood pushes between heartbeats, when your heart is relaxed and filling with blood. Your doctor will give you a goal for your blood pressure.  Your goal will be based on your health and your age. High blood pressure (hypertension) means that the top number stays high, or the bottom number stays high, or both. High blood pressure increases the risk of stroke, heart attack, and other problems. You and your doctor will talk about your risks of these problems based on your blood pressure. What happens when you have high blood pressure? · Blood flows through your arteries with too much force. Over time, this damages the walls of your arteries. But you can't feel it. High blood pressure usually doesn't cause symptoms. · Fat and calcium start to build up in your arteries. This buildup is called plaque. Plaque makes your arteries narrower and stiffer. Blood can't flow through them as easily. · This lack of good blood flow starts to damage some of the organs in your body. This can lead to problems such as coronary artery disease and heart attack, heart failure, stroke, kidney failure, and eye damage. How can you prevent high blood pressure? · Stay at a healthy weight. · Try to limit how much sodium you eat to less than 2,300 milligrams (mg) a day. If you limit your sodium to 1,500 mg a day, you can lower your blood pressure even more. ? Buy foods that are labeled \"unsalted,\" \"sodium-free,\" or \"low-sodium. \" Foods labeled \"reduced-sodium\" and \"light sodium\" may still have too much sodium. ? Flavor your food with garlic, lemon juice, onion, vinegar, herbs, and spices instead of salt. Do not use soy sauce, steak sauce, onion salt, garlic salt, mustard, or ketchup on your food. ? Use less salt (or none) when recipes call for it. You can often use half the salt a recipe calls for without losing flavor. · Be physically active. Get at least 30 minutes of exercise on most days of the week. Walking is a good choice. You also may want to do other activities, such as running, swimming, cycling, or playing tennis or team sports.   · Limit alcohol to 2 drinks a day for men immunizations, labs, imaging, and/or referrals for you. A list of these orders (if applicable) as well as your Preventive Care list are included within your After Visit Summary for your review. Other Preventive Recommendations:    · A preventive eye exam performed by an eye specialist is recommended every 1-2 years to screen for glaucoma; cataracts, macular degeneration, and other eye disorders. · A preventive dental visit is recommended every 6 months. · Try to get at least 150 minutes of exercise per week or 10,000 steps per day on a pedometer . · Order or download the FREE \"Exercise & Physical Activity: Your Everyday Guide\" from [employer]. Call 0-188.478.9248 or search [employer] online. · You need 5600-8993 mg of calcium and 2711-3598 IU of vitamin D per day. It is possible to meet your calcium requirement with diet alone, but a vitamin D supplement is usually necessary to meet this goal.  · When exposed to the sun, use a sunscreen that protects against both UVA and UVB radiation with an SPF of 30 or greater. Reapply every 2 to 3 hours or after sweating, drying off with a towel, or swimming. · Always wear a seat belt when traveling in a car. Always wear a helmet when riding a bicycle or motorcycle.

## 2019-04-30 NOTE — PROGRESS NOTES
Cholecalciferol, 400 units CAPS Take 1 capsule by mouth daily Yes Carolyn Cordoba MD   hydrochlorothiazide (MICROZIDE) 12.5 MG capsule Take 1 capsule by mouth daily Yes Carolyn Cordoba MD   pravastatin (PRAVACHOL) 20 MG tablet TAKE ONE TABLET BY MOUTH ONCE DAILY IN THE EVENING FOR  CHOLESTEROL Yes Carolyn Cordoba MD   Multiple Vitamins-Minerals (THERAPEUTIC MULTIVITAMIN-MINERALS) tablet Take 1 tablet by mouth daily Yes Historical Provider, MD   ranitidine (ZANTAC) 150 MG capsule Take 150 mg by mouth every evening Yes Historical Provider, MD   [address] [place] [address]  Yes Historical Provider, MD   Cinnamon 500 MG TABS Take 1,000 mg by mouth daily  Yes Historical Provider, MD   aspirin 81 MG tablet Take 81 mg by mouth daily Yes Historical Provider, MD   baclofen (LIORESAL) 10 MG tablet Take 1 tablet by mouth 3 times daily as needed (muscle spasms) Start with 1/2 tablet  MD jimena Richardson MD   glimepiride (AMARYL) 2 MG tablet Take 2 mg by mouth every morning (before breakfast) Yes Historical Provider, MD   sildenafil (REVATIO) 20 MG tablet TAKE TWO TABLETS BY MOUTH AS NEEDED ONE  HOUR  PRIOR  TO  INTERCOURSE Yes Carolyn Cordoba MD   metFORMIN (GLUCOPHAGE) 1000 MG tablet TAKE ONE TABLET BY MOUTH TWICE DAILY WITH MEALS Yes Carolyn Cordoba MD   carvedilol (COREG) 25 MG tablet TAKE ONE TABLET BY MOUTH TWICE DAILY Yes Carolyn Cordoba MD   metFORMIN (GLUCOPHAGE) 500 MG tablet Take 1 tablet by mouth every evening With 1000mg tab for total of 1500mg Yes Carolyn Cordoba MD   candesartan (ATACAND) 32 MG tablet Take 1 tablet by mouth daily For blood pressure Yes Carolyn Cordoba MD   glimepiride (AMARYL) 4 MG tablet TAKE 1 TABLET (4MG) BY MOUTH IN THE MORNING AND 1/2 TABLET (2 MG) IN THE EVENING WITH SUPPER Yes Carolyn Cordoba MD   doxazosin (CARDURA) 4 MG tablet TAKE 1 TABLET BY MOUTH NIGHTLY Yes Carolyn Cordoba MD   magnesium oxide (MAG-OX) 400 (241.3 Mg) MG TABS tablet TAKE ONE TABLET BY MOUTH  DAILY Yes Robbin Morrison MD   Vitamin D, Cholecalciferol, 400 units CAPS Take 1 capsule by mouth daily Yes Robbin Morrison MD   hydrochlorothiazide (MICROZIDE) 12.5 MG capsule Take 1 capsule by mouth daily Yes Robbin Morrison MD   pravastatin (PRAVACHOL) 20 MG tablet TAKE ONE TABLET BY MOUTH ONCE DAILY IN THE EVENING FOR  CHOLESTEROL Yes Robbin Morrison MD   Multiple Vitamins-Minerals (THERAPEUTIC MULTIVITAMIN-MINERALS) tablet Take 1 tablet by mouth daily Yes Historical Provider, MD   ranitidine (ZANTAC) 150 MG capsule Take 150 mg by mouth every evening Yes Historical Provider, MD   [facility] [place]  Yes Historical Provider, MD   Cinnamon 500 MG TABS Take 1,000 mg by mouth daily  Yes Historical Provider, MD   aspirin 81 MG tablet Take 81 mg by mouth daily Yes Historical Provider, MD   baclofen (LIORESAL) 10 MG tablet Take 1 tablet by mouth 3 times daily as needed (muscle spasms) Start with 1/2 tablet  Robbin Morrison MD      Diagnosis Date    Erectile dysfunction     Hyperlipidemia     Hypertension     LEW treated with BiPAP     Type 2 diabetes mellitus ([facility] [address] 2000     Past Surgical History:   Procedure Laterality Date    APPENDECTOMY  as teenager    BACK SURGERY  1988    herniated disc- L5-S1    CATARACT REMOVAL Bilateral     COLONOSCOPY  07/2016    ENDOSCOPIC ULTRASOUND (UPPER)  02/22/2017    EYE SURGERY Right 2005    TONSILLECTOMY      as teenager    UPPER GASTROINTESTINAL ENDOSCOPY         Family History   Problem Relation Age of Onset    Diabetes Mother     Cancer Mother         lung    Diabetes Father     Kidney Disease Father     Heart Attack Paternal Grandfather 72       CareTeam (Including outside providers/suppliers regularly involved in providing care):   Patient Care Team:  Robbin Morrison MD as PCP - Preet Girard MD as PCP - S Attributed Provider  Harman Rawls MD as Consulting Physician (Sleep Medicine)    Wt Readings from Last 3 Encounters:   04/30/19 190 lb 6.4 oz (86.4 kg)   01/28/19 193 lb 9.6 oz (87.8 kg)   01/15/19 191 lb 3.2 oz (86.7 kg)     Vitals:    04/30/19 0947   BP: 120/72   Site: Left Upper Arm   Position: Sitting   Cuff Size: Medium Adult   Pulse: 68   Resp: 16   Temp: 97.8 °F (36.6 °C)   TempSrc: Temporal   Weight: 190 lb 6.4 oz (86.4 kg)   Height: 5' 9.49\" (1.765 m)     Body mass index is 27.72 kg/m². Based upon direct observation of the patient, evaluation of cognition reveals recent and remote memory intact. Patient's complete Health Risk Assessment and screening values have been reviewed and are found in Flowsheets. The following problems were reviewed today and where indicated follow up appointments were made and/or referrals ordered. Positive Risk Factor Screenings with Interventions:     General Health:  General  In general, how would you say your health is?: Good  In the past 7 days, have you experienced any of the following?  New or Increased Pain, New or Increased Fatigue, Loneliness, Social Isolation, Stress or Anger?: None of These  Do you get the social and emotional support that you need?: Yes  Do you have a Living Will?: (!) No  General Health Risk Interventions:  · No Living Will: additional information provided discussed talking with     Hearing/Vision:  Hearing/Vision  Do you or your family notice any trouble with your hearing?: (!) Yes  Do you have difficulty driving, watching TV, or doing any of your daily activities because of your eyesight?: No  Have you had an eye exam within the past year?: Yes  Hearing/Vision Interventions:  · Hearing concerns:  audiology referral provided    Safety:  Safety  Do you have working smoke detectors?: Yes  Have all throw rugs been removed or fastened?: Yes  Do you have non-slip mats in all bathtubs?: (!) No(no bath tub)  Do all of your stairways have a railing or banister?: (!) No  Are your doorways, halls and stairs free of clutter?: Yes  Do you always fasten your seatbelt when you are in a car?: Yes  Safety Interventions:  · Home safety tips provided    Personalized Preventive Plan   Current Health Maintenance Status  Immunization History   Administered Date(s) Administered    Influenza, High Dose (Fluzone 65 yrs and older) 11/05/2017, 09/16/2018    Influenza, Raji Wheatleyley, 3 Years and older, IM (Fluzone 3 yrs and older or Afluria 5 yrs and older) 09/22/2016    Pneumococcal 13-valent Conjugate (Mirella Stair) 06/21/2016, 09/16/2018    Pneumococcal Conjugate 7-valent 10/06/2009        Health Maintenance   Topic Date Due    DTaP/Tdap/Td vaccine (1 - Tdap) 03/16/1970    Shingles Vaccine (1 of 2) 03/16/2001    Lipid screen  03/05/2019    Diabetic foot exam  05/21/2019    Potassium monitoring  07/24/2019    Pneumococcal 65+ years Vaccine (2 of 2 - PPSV23) 09/16/2019    A1C test (Diabetic or Prediabetic)  02/14/2020    Diabetic microalbuminuria test  02/14/2020    Diabetic retinal exam  02/20/2020    Creatinine monitoring  04/15/2020    Colon cancer screen colonoscopy  07/26/2021    Flu vaccine  Completed    AAA screen  Completed    Hepatitis C screen  Completed     Recommendations for Preventive Services Due: see orders and patient instructions/AVS.  . Recommended screening schedule for the next 5-10 years is provided to the patient in written form: see Patient Instructions/AVS.                     Simón Henry is a 77 y.o. male who presents today for:   Chief Complaint   Patient presents with    Medicare AWV         HPI:      HPI  In addition, also discussed other recent issues    DMII-  Saw  last week  Sent to dietician and saw once  Checking sugars 7 times per day x 7 days  Stopped Victoza and put on Trulicity instead b/c of diarrhea worsening. Early to say if helping  No lows. Elevated calcium-  states that discussed more thoroughly at last appt with him. ?hyperparathyroidism as cause.    Has f/u blood work. States that may need to go to [facility] if things get worse. HTN- BP controlled. No cp/sob/ha/dizziness. Tolerating meds well with no side effects. Saw derm  One skin cancer that was fully removed. F/u in year with derm    Vit D def- stable and supplemented. Notes some hearing loss. Would like hearing tested    Patient's medications, allergies, past medical, surgical, social,and family histories were reviewed and updated as appropriate.     Outpatient Medications Prior to Visit   Medication Sig Dispense Refill    Dulaglutide (TRULICITY) 1.5 PY/3.4BY SOPN Inject into the skin      glimepiride (AMARYL) 2 MG tablet Take 2 mg by mouth every morning (before breakfast)      sildenafil (REVATIO) 20 MG tablet TAKE TWO TABLETS BY MOUTH AS NEEDED ONE  HOUR  PRIOR  TO  INTERCOURSE 30 tablet 7    metFORMIN (GLUCOPHAGE) 1000 MG tablet TAKE ONE TABLET BY MOUTH TWICE DAILY WITH MEALS 180 tablet 3    carvedilol (COREG) 25 MG tablet TAKE ONE TABLET BY MOUTH TWICE DAILY 180 tablet 3    metFORMIN (GLUCOPHAGE) 500 MG tablet Take 1 tablet by mouth every evening With 1000mg tab for total of 1500mg 90 tablet 3    candesartan (ATACAND) 32 MG tablet Take 1 tablet by mouth daily For blood pressure 30 tablet 11    glimepiride (AMARYL) 4 MG tablet TAKE 1 TABLET (4MG) BY MOUTH IN THE MORNING AND 1/2 TABLET (2 MG) IN THE EVENING WITH SUPPER 135 tablet 1    doxazosin (CARDURA) 4 MG tablet TAKE 1 TABLET BY MOUTH NIGHTLY 90 tablet 3    magnesium oxide (MAG-OX) 400 (241.3 Mg) MG TABS tablet TAKE ONE TABLET BY MOUTH  DAILY 30 tablet 11    Vitamin D, Cholecalciferol, 400 units CAPS Take 1 capsule by mouth daily 30 capsule 11    hydrochlorothiazide (MICROZIDE) 12.5 MG capsule Take 1 capsule by mouth daily 90 capsule 3    pravastatin (PRAVACHOL) 20 MG tablet TAKE ONE TABLET BY MOUTH ONCE DAILY IN THE EVENING FOR  CHOLESTEROL 30 tablet 11    Multiple Vitamins-Minerals (THERAPEUTIC MULTIVITAMIN-MINERALS) tablet Take 1 tablet by mouth daily      ranitidine (ZANTAC) 150 MG capsule Take 150 mg by mouth every evening      ONE TOUCH ULTRA TEST STRP       Cinnamon 500 MG TABS Take 1,000 mg by mouth daily       aspirin 81 MG tablet Take 81 mg by mouth daily      Liraglutide (VICTOZA) 18 MG/3ML SOPN SC injection Inject 1.2 mg into the skin daily      SITagliptin (JANUVIA) 100 MG tablet Take 1 tablet by mouth daily 90 tablet 3    baclofen (LIORESAL) 10 MG tablet Take 1 tablet by mouth 3 times daily as needed (muscle spasms) Start with 1/2 tablet 40 tablet 0    sildenafil (VIAGRA) 25 MG tablet Take 20 mg by mouth as needed for Erectile Dysfunction       No facility-administered medications prior to visit. Subjective:     Review of Systems   Constitutional: Negative for activity change, appetite change, chills, diaphoresis, fatigue, fever and unexpected weight change. Respiratory: Negative for cough, shortness of breath, wheezing and stridor. Cardiovascular: Negative for chest pain, palpitations and leg swelling. Gastrointestinal: Negative for abdominal pain, blood in stool, constipation, diarrhea, nausea and vomiting. Genitourinary: Negative. Musculoskeletal: Negative. Neurological: Negative for headaches. Psychiatric/Behavioral: Negative. Objective:     Vitals:    04/30/19 0947   BP: 120/72   Site: Left Upper Arm   Position: Sitting   Cuff Size: Medium Adult   Pulse: 68   Resp: 16   Temp: 97.8 °F (36.6 °C)   TempSrc: Temporal   Weight: 190 lb 6.4 oz (86.4 kg)   Height: 5' 9.49\" (1.765 m)     Wt Readings from Last 3 Encounters:   04/30/19 190 lb 6.4 oz (86.4 kg)   01/28/19 193 lb 9.6 oz (87.8 kg)   01/15/19 191 lb 3.2 oz (86.7 kg)     Physical Exam   Constitutional: He is oriented to person, place, and time. He appears well-developed and well-nourished. No distress. HENT:   Head: Normocephalic and atraumatic.    Right Ear: Tympanic membrane, external ear and ear canal normal.   Left Ear: Tympanic membrane, external ear and ear canal normal.   Nose: Nose normal.   Mouth/Throat: Uvula is midline, oropharynx is clear and moist and mucous membranes are normal.   Eyes: Conjunctivae are normal. Right eye exhibits no discharge. Left eye exhibits no discharge. Neck: Normal range of motion. Neck supple. No tracheal deviation present. No thyromegaly present. Cardiovascular: Normal rate, regular rhythm and normal heart sounds. Exam reveals no gallop and no friction rub. No murmur heard. 3/6 NANCI RUSB   Pulmonary/Chest: Effort normal and breath sounds normal. No stridor. No respiratory distress. He has no wheezes. He has no rales. Abdominal: Soft. He exhibits no distension and no mass. There is no tenderness. There is no rebound and no guarding. Musculoskeletal: He exhibits no edema. Lymphadenopathy:     He has no cervical adenopathy. Neurological: He is alert and oriented to person, place, and time. Skin: Skin is warm and dry. No rash noted. He is not diaphoretic. Psychiatric: He has a normal mood and affect. His behavior is normal. Judgment and thought content normal.   Nursing note and vitals reviewed. Assessment/Plan:      Diagnosis Orders   1. Routine general medical examination at a health care facility     2. Bilateral hearing loss, unspecified hearing loss type  [facility] - [place]   3. Heart murmur  Echocardiogram transthoracic   4. Vitamin D insufficiency     5. Uncontrolled type 2 diabetes mellitus without complication, without long-term current use of insulin ([address]     6. Hypercalcemia         Noted murmur  Check echo    Cont same meds    F/u with endo      Patient given educational materials- see patient instructions. Discussed use, benefit, and side effects of prescribedmedications. All patient questions answered. Pt voiced understanding. Reviewedhealth maintenance. Discussed medications, diet and exercise. Patient agreed withtreatment plan. Follow up as directed. Return in 4 months (on 8/30/2019) for DMII, HTN.      (Please notethat portions of this note were completed with a voice recognition program. Effortswere made to edit the dictations but occasionally words are mis-transcribed.)

## 2019-05-02 ASSESSMENT — ENCOUNTER SYMPTOMS
COUGH: 0
ABDOMINAL PAIN: 0
SHORTNESS OF BREATH: 0
BLOOD IN STOOL: 0
NAUSEA: 0
CONSTIPATION: 0
VOMITING: 0
WHEEZING: 0
DIARRHEA: 0
STRIDOR: 0

## 2019-05-09 ENCOUNTER — HOSPITAL ENCOUNTER (OUTPATIENT)
Dept: NON INVASIVE DIAGNOSTICS | Age: 69
Discharge: HOME | End: 2019-05-09
Payer: MEDICARE

## 2019-05-09 LAB
LV EF: 60 %
LVEF MODALITY: NORMAL

## 2019-05-09 PROCEDURE — 93306 TTE W/DOPPLER COMPLETE: CPT

## 2019-05-17 ENCOUNTER — HOSPITAL ENCOUNTER (OUTPATIENT)
Dept: AUDIOLOGY | Age: 69
Discharge: HOME | End: 2019-05-17
Payer: MEDICARE

## 2019-05-17 PROCEDURE — 92567 TYMPANOMETRY: CPT | Performed by: AUDIOLOGIST

## 2019-05-17 PROCEDURE — 92557 COMPREHENSIVE HEARING TEST: CPT | Performed by: AUDIOLOGIST

## 2019-05-21 ENCOUNTER — TELEPHONE (OUTPATIENT)
Dept: AUDIOLOGY | Age: 69
End: 2019-05-21

## 2019-05-21 NOTE — TELEPHONE ENCOUNTER
Called [employer] on behalf of the patient today, as promised. His plan is a Medicare Supplement and they only pay on what Medicare pays on. I called the patient and informed him that his [employer] will not cover hearing aids because Medicare does not cover hearing aids. He is aware that it would be self pay if he decides to pursue hearing aids. He expressed understanding.

## 2019-06-08 DIAGNOSIS — E78.00 PURE HYPERCHOLESTEROLEMIA: ICD-10-CM

## 2019-06-16 RX ORDER — PRAVASTATIN SODIUM 20 MG
TABLET ORAL
Qty: 90 TABLET | Refills: 3 | Status: SHIPPED | OUTPATIENT
Start: 2019-06-16 | End: 2020-06-08

## 2019-06-17 DIAGNOSIS — E83.42 HYPOMAGNESEMIA: ICD-10-CM

## 2019-07-05 DIAGNOSIS — I10 ESSENTIAL HYPERTENSION: ICD-10-CM

## 2019-07-09 ENCOUNTER — OFFICE VISIT (OUTPATIENT)
Dept: FAMILY MEDICINE CLINIC | Age: 69
End: 2019-07-09
Payer: MEDICARE

## 2019-07-09 VITALS
WEIGHT: 190 LBS | TEMPERATURE: 98 F | HEART RATE: 78 BPM | SYSTOLIC BLOOD PRESSURE: 130 MMHG | DIASTOLIC BLOOD PRESSURE: 70 MMHG | BODY MASS INDEX: 27.67 KG/M2 | RESPIRATION RATE: 18 BRPM

## 2019-07-09 DIAGNOSIS — S39.012A STRAIN OF LUMBAR REGION, INITIAL ENCOUNTER: Primary | ICD-10-CM

## 2019-07-09 PROCEDURE — 4040F PNEUMOC VAC/ADMIN/RCVD: CPT | Performed by: NURSE PRACTITIONER

## 2019-07-09 PROCEDURE — 1123F ACP DISCUSS/DSCN MKR DOCD: CPT | Performed by: NURSE PRACTITIONER

## 2019-07-09 PROCEDURE — 99213 OFFICE O/P EST LOW 20 MIN: CPT | Performed by: NURSE PRACTITIONER

## 2019-07-09 PROCEDURE — G8427 DOCREV CUR MEDS BY ELIG CLIN: HCPCS | Performed by: NURSE PRACTITIONER

## 2019-07-09 PROCEDURE — G8417 CALC BMI ABV UP PARAM F/U: HCPCS | Performed by: NURSE PRACTITIONER

## 2019-07-09 PROCEDURE — 1036F TOBACCO NON-USER: CPT | Performed by: NURSE PRACTITIONER

## 2019-07-09 PROCEDURE — 3017F COLORECTAL CA SCREEN DOC REV: CPT | Performed by: NURSE PRACTITIONER

## 2019-07-09 RX ORDER — GLIMEPIRIDE 4 MG/1
TABLET ORAL
Qty: 135 TABLET | Refills: 1 | Status: SHIPPED | OUTPATIENT
Start: 2019-07-09 | End: 2020-01-06

## 2019-07-09 RX ORDER — HYDROCHLOROTHIAZIDE 12.5 MG/1
CAPSULE, GELATIN COATED ORAL
Qty: 90 CAPSULE | Refills: 3 | Status: SHIPPED | OUTPATIENT
Start: 2019-07-09 | End: 2020-07-06

## 2019-07-09 ASSESSMENT — ENCOUNTER SYMPTOMS: BACK PAIN: 1

## 2019-09-02 ENCOUNTER — TELEPHONE (OUTPATIENT)
Dept: FAMILY MEDICINE CLINIC | Age: 69
End: 2019-09-02

## 2019-09-03 ENCOUNTER — HOSPITAL ENCOUNTER (OUTPATIENT)
Dept: INTERVENTIONAL RADIOLOGY/VASCULAR | Age: 69
Discharge: HOME | End: 2019-09-03
Payer: MEDICARE

## 2019-09-03 ENCOUNTER — OFFICE VISIT (OUTPATIENT)
Dept: FAMILY MEDICINE CLINIC | Age: 69
End: 2019-09-03
Payer: MEDICARE

## 2019-09-03 VITALS
BODY MASS INDEX: 28.08 KG/M2 | DIASTOLIC BLOOD PRESSURE: 64 MMHG | RESPIRATION RATE: 14 BRPM | SYSTOLIC BLOOD PRESSURE: 122 MMHG | TEMPERATURE: 97.7 F | HEIGHT: 69 IN | HEART RATE: 64 BPM | WEIGHT: 189.6 LBS

## 2019-09-03 DIAGNOSIS — I80.02 THROMBOPHLEBITIS OF SUPERFICIAL VEINS OF LEFT LOWER EXTREMITY: ICD-10-CM

## 2019-09-03 DIAGNOSIS — I10 ESSENTIAL HYPERTENSION: ICD-10-CM

## 2019-09-03 DIAGNOSIS — Z23 NEED FOR INFLUENZA VACCINATION: ICD-10-CM

## 2019-09-03 LAB — HBA1C MFR BLD: 6.7 %

## 2019-09-03 PROCEDURE — 93971 EXTREMITY STUDY: CPT

## 2019-09-03 PROCEDURE — 3017F COLORECTAL CA SCREEN DOC REV: CPT | Performed by: FAMILY MEDICINE

## 2019-09-03 PROCEDURE — 83036 HEMOGLOBIN GLYCOSYLATED A1C: CPT | Performed by: FAMILY MEDICINE

## 2019-09-03 PROCEDURE — G8417 CALC BMI ABV UP PARAM F/U: HCPCS | Performed by: FAMILY MEDICINE

## 2019-09-03 PROCEDURE — 99214 OFFICE O/P EST MOD 30 MIN: CPT | Performed by: FAMILY MEDICINE

## 2019-09-03 PROCEDURE — 1036F TOBACCO NON-USER: CPT | Performed by: FAMILY MEDICINE

## 2019-09-03 PROCEDURE — 4040F PNEUMOC VAC/ADMIN/RCVD: CPT | Performed by: FAMILY MEDICINE

## 2019-09-03 PROCEDURE — G8427 DOCREV CUR MEDS BY ELIG CLIN: HCPCS | Performed by: FAMILY MEDICINE

## 2019-09-03 PROCEDURE — 1123F ACP DISCUSS/DSCN MKR DOCD: CPT | Performed by: FAMILY MEDICINE

## 2019-09-03 PROCEDURE — G0008 ADMIN INFLUENZA VIRUS VAC: HCPCS | Performed by: FAMILY MEDICINE

## 2019-09-03 PROCEDURE — 3044F HG A1C LEVEL LT 7.0%: CPT | Performed by: FAMILY MEDICINE

## 2019-09-03 PROCEDURE — 90653 IIV ADJUVANT VACCINE IM: CPT | Performed by: FAMILY MEDICINE

## 2019-09-03 PROCEDURE — 2022F DILAT RTA XM EVC RTNOPTHY: CPT | Performed by: FAMILY MEDICINE

## 2019-09-03 NOTE — PROGRESS NOTES
30 tablet 7    metFORMIN (GLUCOPHAGE) 1000 MG tablet TAKE ONE TABLET BY MOUTH TWICE DAILY WITH MEALS 180 tablet 3    carvedilol (COREG) 25 MG tablet TAKE ONE TABLET BY MOUTH TWICE DAILY 180 tablet 3    metFORMIN (GLUCOPHAGE) 500 MG tablet Take 1 tablet by mouth every evening With 1000mg tab for total of 1500mg 90 tablet 3    candesartan (ATACAND) 32 MG tablet Take 1 tablet by mouth daily For blood pressure 30 tablet 11    doxazosin (CARDURA) 4 MG tablet TAKE 1 TABLET BY MOUTH NIGHTLY 90 tablet 3    Vitamin D, Cholecalciferol, 400 units CAPS Take 1 capsule by mouth daily 30 capsule 11    Multiple Vitamins-Minerals (THERAPEUTIC MULTIVITAMIN-MINERALS) tablet Take 1 tablet by mouth daily      ranitidine (ZANTAC) 150 MG capsule Take 150 mg by mouth every evening      ONE TOUCH ULTRA TEST STRP       Cinnamon 500 MG TABS Take 1,000 mg by mouth daily       aspirin 81 MG tablet Take 81 mg by mouth daily      baclofen (LIORESAL) 10 MG tablet Take 1 tablet by mouth 3 times daily as needed (muscle spasms) Start with 1/2 tablet (Patient not taking: Reported on 9/3/2019) 40 tablet 0     No facility-administered medications prior to visit. Subjective:     Review of Systems   Constitutional: Negative for activity change, appetite change, chills, diaphoresis, fatigue, fever and unexpected weight change. HENT: Negative. Respiratory: Negative for cough, shortness of breath, wheezing and stridor. Cardiovascular: Negative for chest pain, palpitations and leg swelling. Gastrointestinal: Negative for abdominal pain, blood in stool, constipation, diarrhea, nausea and vomiting. Genitourinary: Negative. Musculoskeletal: Negative. Skin: Negative. Neurological: Negative for headaches. Psychiatric/Behavioral: Negative.         Objective:     Vitals:    09/03/19 0857   BP: 122/64   Site: Left Upper Arm   Position: Sitting   Cuff Size: Medium Adult   Pulse: 64   Resp: 14   Temp: 97.7 °F (36.5 °C) maintenance. Discussed medications, diet and exercise. Patient agreed withtreatment plan. Follow up as directed. Return in about 3 weeks (around 9/24/2019) for punch bx L temple anytime in next month.       (Please notethat portions of this note were completed with a voice recognition program. Effortswere made to edit the dictations but occasionally words are mis-transcribed.)

## 2019-09-07 ASSESSMENT — ENCOUNTER SYMPTOMS
STRIDOR: 0
ABDOMINAL PAIN: 0
BLOOD IN STOOL: 0
DIARRHEA: 0
CONSTIPATION: 0
COUGH: 0
SHORTNESS OF BREATH: 0
VOMITING: 0
NAUSEA: 0
WHEEZING: 0

## 2019-09-30 ENCOUNTER — NURSE ONLY (OUTPATIENT)
Dept: LAB | Age: 69
End: 2019-09-30

## 2019-09-30 ENCOUNTER — TELEPHONE (OUTPATIENT)
Dept: FAMILY MEDICINE CLINIC | Age: 69
End: 2019-09-30

## 2019-09-30 DIAGNOSIS — D64.9 NORMOCYTIC ANEMIA: Primary | ICD-10-CM

## 2019-09-30 LAB
ALBUMIN SERPL-MCNC: 4.3 G/DL (ref 3.5–5.1)
ALP BLD-CCNC: 53 U/L (ref 38–126)
ALT SERPL-CCNC: 17 U/L (ref 11–66)
ANION GAP SERPL CALCULATED.3IONS-SCNC: 9 MEQ/L (ref 8–16)
AST SERPL-CCNC: 15 U/L (ref 5–40)
BASOPHILS # BLD: 0.8 %
BASOPHILS ABSOLUTE: 0.1 THOU/MM3 (ref 0–0.1)
BILIRUB SERPL-MCNC: 0.3 MG/DL (ref 0.3–1.2)
BUN BLDV-MCNC: 22 MG/DL (ref 7–22)
CALCIUM SERPL-MCNC: 10.9 MG/DL (ref 8.5–10.5)
CHLORIDE BLD-SCNC: 102 MEQ/L (ref 98–111)
CHOLESTEROL, FASTING: 126 MG/DL (ref 100–199)
CO2: 32 MEQ/L (ref 23–33)
CREAT SERPL-MCNC: 1 MG/DL (ref 0.4–1.2)
CREATININE, URINE: 64.4 MG/DL
EOSINOPHIL # BLD: 2.2 %
EOSINOPHILS ABSOLUTE: 0.2 THOU/MM3 (ref 0–0.4)
ERYTHROCYTE [DISTWIDTH] IN BLOOD BY AUTOMATED COUNT: 13.6 % (ref 11.5–14.5)
ERYTHROCYTE [DISTWIDTH] IN BLOOD BY AUTOMATED COUNT: 46.3 FL (ref 35–45)
GFR SERPL CREATININE-BSD FRML MDRD: 74 ML/MIN/1.73M2
GLUCOSE FASTING: 142 MG/DL (ref 70–108)
HCT VFR BLD CALC: 39.1 % (ref 42–52)
HDLC SERPL-MCNC: 29 MG/DL
HEMOGLOBIN: 12 GM/DL (ref 14–18)
IMMATURE GRANS (ABS): 0.02 THOU/MM3 (ref 0–0.07)
IMMATURE GRANULOCYTES: 0.3 %
LDL CHOLESTEROL CALCULATED: 73 MG/DL
LYMPHOCYTES # BLD: 33.7 %
LYMPHOCYTES ABSOLUTE: 2.5 THOU/MM3 (ref 1–4.8)
MCH RBC QN AUTO: 28.5 PG (ref 26–33)
MCHC RBC AUTO-ENTMCNC: 30.7 GM/DL (ref 32.2–35.5)
MCV RBC AUTO: 92.9 FL (ref 80–94)
MICROALBUMIN UR-MCNC: < 1.2 MG/DL
MICROALBUMIN/CREAT UR-RTO: 19 MG/G (ref 0–30)
MONOCYTES # BLD: 9.8 %
MONOCYTES ABSOLUTE: 0.7 THOU/MM3 (ref 0.4–1.3)
NUCLEATED RED BLOOD CELLS: 0 /100 WBC
PHOSPHORUS: 2.8 MG/DL (ref 2.4–4.7)
PLATELET # BLD: 267 THOU/MM3 (ref 130–400)
PMV BLD AUTO: 10.6 FL (ref 9.4–12.4)
POTASSIUM SERPL-SCNC: 4.5 MEQ/L (ref 3.5–5.2)
RBC # BLD: 4.21 MILL/MM3 (ref 4.7–6.1)
SEG NEUTROPHILS: 53.2 %
SEGMENTED NEUTROPHILS ABSOLUTE COUNT: 3.9 THOU/MM3 (ref 1.8–7.7)
SODIUM BLD-SCNC: 143 MEQ/L (ref 135–145)
TOTAL PROTEIN: 6.7 G/DL (ref 6.1–8)
TRIGLYCERIDE, FASTING: 121 MG/DL (ref 0–199)
VITAMIN D 25-HYDROXY: 34 NG/ML (ref 30–100)
WBC # BLD: 7.3 THOU/MM3 (ref 4.8–10.8)

## 2019-10-01 ENCOUNTER — PROCEDURE VISIT (OUTPATIENT)
Dept: FAMILY MEDICINE CLINIC | Age: 69
End: 2019-10-01
Payer: MEDICARE

## 2019-10-01 VITALS
BODY MASS INDEX: 27.08 KG/M2 | WEIGHT: 186 LBS | SYSTOLIC BLOOD PRESSURE: 120 MMHG | HEART RATE: 72 BPM | DIASTOLIC BLOOD PRESSURE: 70 MMHG | TEMPERATURE: 97.8 F | RESPIRATION RATE: 16 BRPM

## 2019-10-01 DIAGNOSIS — J30.2 SEASONAL ALLERGIES: ICD-10-CM

## 2019-10-01 DIAGNOSIS — L98.9 SKIN LESION: Primary | ICD-10-CM

## 2019-10-01 LAB
AVERAGE GLUCOSE: 150 MG/DL (ref 70–126)
HBA1C MFR BLD: 7 % (ref 4.4–6.4)

## 2019-10-01 PROCEDURE — 11104 PUNCH BX SKIN SINGLE LESION: CPT | Performed by: FAMILY MEDICINE

## 2019-10-01 RX ORDER — FLUTICASONE PROPIONATE 50 MCG
2 SPRAY, SUSPENSION (ML) NASAL DAILY
Qty: 1 BOTTLE | Refills: 5 | Status: SHIPPED | OUTPATIENT
Start: 2019-10-01 | End: 2020-07-06

## 2019-10-01 ASSESSMENT — ENCOUNTER SYMPTOMS: RESPIRATORY NEGATIVE: 1

## 2019-10-02 LAB — ANGIOTENSIN CONVERTING ENZYME: 23 U/L (ref 9–67)

## 2019-10-04 LAB
PROTEIN ELECTROPHORESIS, SERUM: NORMAL
RETINOL (VITAMIN A): NORMAL

## 2019-10-08 ENCOUNTER — NURSE ONLY (OUTPATIENT)
Dept: FAMILY MEDICINE CLINIC | Age: 69
End: 2019-10-08

## 2019-10-08 DIAGNOSIS — Z48.02 VISIT FOR SUTURE REMOVAL: Primary | ICD-10-CM

## 2019-10-08 PROCEDURE — 99999 PR OFFICE/OUTPT VISIT,PROCEDURE ONLY: CPT | Performed by: FAMILY MEDICINE

## 2019-10-31 RX ORDER — RANITIDINE 150 MG/1
150 CAPSULE ORAL EVERY EVENING
Qty: 90 CAPSULE | Refills: 3 | Status: SHIPPED | OUTPATIENT
Start: 2019-10-31 | End: 2019-12-11

## 2019-11-05 ENCOUNTER — OFFICE VISIT (OUTPATIENT)
Dept: FAMILY MEDICINE CLINIC | Age: 69
End: 2019-11-05
Payer: MEDICARE

## 2019-11-05 VITALS
RESPIRATION RATE: 18 BRPM | HEART RATE: 60 BPM | HEIGHT: 69 IN | TEMPERATURE: 97.2 F | BODY MASS INDEX: 28.26 KG/M2 | DIASTOLIC BLOOD PRESSURE: 82 MMHG | SYSTOLIC BLOOD PRESSURE: 138 MMHG | OXYGEN SATURATION: 96 % | WEIGHT: 190.8 LBS

## 2019-11-05 DIAGNOSIS — M19.049 HAND ARTHRITIS: Primary | ICD-10-CM

## 2019-11-05 DIAGNOSIS — E11.9 CONTROLLED TYPE 2 DIABETES MELLITUS WITHOUT COMPLICATION, WITHOUT LONG-TERM CURRENT USE OF INSULIN (HCC): ICD-10-CM

## 2019-11-05 PROCEDURE — 3017F COLORECTAL CA SCREEN DOC REV: CPT | Performed by: FAMILY MEDICINE

## 2019-11-05 PROCEDURE — 4040F PNEUMOC VAC/ADMIN/RCVD: CPT | Performed by: FAMILY MEDICINE

## 2019-11-05 PROCEDURE — 99213 OFFICE O/P EST LOW 20 MIN: CPT | Performed by: FAMILY MEDICINE

## 2019-11-05 PROCEDURE — 2022F DILAT RTA XM EVC RTNOPTHY: CPT | Performed by: FAMILY MEDICINE

## 2019-11-05 PROCEDURE — 1036F TOBACCO NON-USER: CPT | Performed by: FAMILY MEDICINE

## 2019-11-05 PROCEDURE — G8417 CALC BMI ABV UP PARAM F/U: HCPCS | Performed by: FAMILY MEDICINE

## 2019-11-05 PROCEDURE — G8482 FLU IMMUNIZE ORDER/ADMIN: HCPCS | Performed by: FAMILY MEDICINE

## 2019-11-05 PROCEDURE — 1123F ACP DISCUSS/DSCN MKR DOCD: CPT | Performed by: FAMILY MEDICINE

## 2019-11-05 PROCEDURE — G8427 DOCREV CUR MEDS BY ELIG CLIN: HCPCS | Performed by: FAMILY MEDICINE

## 2019-11-06 ASSESSMENT — ENCOUNTER SYMPTOMS
NAUSEA: 0
BLOOD IN STOOL: 0
STRIDOR: 0
SHORTNESS OF BREATH: 0
DIARRHEA: 0
VOMITING: 0
ABDOMINAL PAIN: 0
COUGH: 0
CONSTIPATION: 0
WHEEZING: 0

## 2019-11-11 ENCOUNTER — HOSPITAL ENCOUNTER (OUTPATIENT)
Dept: GENERAL RADIOLOGY | Age: 69
Discharge: HOME | End: 2019-11-11
Payer: MEDICARE

## 2019-11-11 ENCOUNTER — HOSPITAL ENCOUNTER (OUTPATIENT)
Age: 69
Discharge: HOME | End: 2019-11-11
Payer: MEDICARE

## 2019-11-11 ENCOUNTER — TELEPHONE (OUTPATIENT)
Dept: FAMILY MEDICINE CLINIC | Age: 69
End: 2019-11-11

## 2019-11-11 ENCOUNTER — OFFICE VISIT (OUTPATIENT)
Dept: FAMILY MEDICINE CLINIC | Age: 69
End: 2019-11-11
Payer: MEDICARE

## 2019-11-11 VITALS
HEIGHT: 69 IN | OXYGEN SATURATION: 97 % | WEIGHT: 191.2 LBS | SYSTOLIC BLOOD PRESSURE: 118 MMHG | RESPIRATION RATE: 16 BRPM | HEART RATE: 64 BPM | BODY MASS INDEX: 28.32 KG/M2 | DIASTOLIC BLOOD PRESSURE: 62 MMHG | TEMPERATURE: 97.8 F

## 2019-11-11 DIAGNOSIS — R50.9 FEVER, UNSPECIFIED FEVER CAUSE: ICD-10-CM

## 2019-11-11 DIAGNOSIS — J20.8 VIRAL BRONCHITIS: Primary | ICD-10-CM

## 2019-11-11 DIAGNOSIS — R05.9 COUGH: ICD-10-CM

## 2019-11-11 DIAGNOSIS — R05.9 COUGH: Primary | ICD-10-CM

## 2019-11-11 LAB
INFLUENZA VIRUS A RNA: NEGATIVE
INFLUENZA VIRUS B RNA: NEGATIVE

## 2019-11-11 PROCEDURE — 87502 INFLUENZA DNA AMP PROBE: CPT | Performed by: FAMILY MEDICINE

## 2019-11-11 PROCEDURE — 1036F TOBACCO NON-USER: CPT | Performed by: FAMILY MEDICINE

## 2019-11-11 PROCEDURE — G8417 CALC BMI ABV UP PARAM F/U: HCPCS | Performed by: FAMILY MEDICINE

## 2019-11-11 PROCEDURE — 99213 OFFICE O/P EST LOW 20 MIN: CPT | Performed by: FAMILY MEDICINE

## 2019-11-11 PROCEDURE — 3017F COLORECTAL CA SCREEN DOC REV: CPT | Performed by: FAMILY MEDICINE

## 2019-11-11 PROCEDURE — 4040F PNEUMOC VAC/ADMIN/RCVD: CPT | Performed by: FAMILY MEDICINE

## 2019-11-11 PROCEDURE — 71046 X-RAY EXAM CHEST 2 VIEWS: CPT

## 2019-11-11 PROCEDURE — 1123F ACP DISCUSS/DSCN MKR DOCD: CPT | Performed by: FAMILY MEDICINE

## 2019-11-11 PROCEDURE — G8482 FLU IMMUNIZE ORDER/ADMIN: HCPCS | Performed by: FAMILY MEDICINE

## 2019-11-11 PROCEDURE — G8427 DOCREV CUR MEDS BY ELIG CLIN: HCPCS | Performed by: FAMILY MEDICINE

## 2019-11-11 RX ORDER — GUAIFENESIN 600 MG/1
600 TABLET, EXTENDED RELEASE ORAL 2 TIMES DAILY
Qty: 30 TABLET | Refills: 0 | Status: SHIPPED | OUTPATIENT
Start: 2019-11-11 | End: 2021-03-02 | Stop reason: ALTCHOICE

## 2019-11-11 RX ORDER — ALBUTEROL SULFATE 90 UG/1
2 AEROSOL, METERED RESPIRATORY (INHALATION) EVERY 4 HOURS PRN
Qty: 1 INHALER | Refills: 0 | Status: SHIPPED | OUTPATIENT
Start: 2019-11-11 | End: 2021-01-25

## 2019-11-11 RX ORDER — IBUPROFEN 200 MG
400 TABLET ORAL EVERY 6 HOURS PRN
COMMUNITY

## 2019-11-11 ASSESSMENT — ENCOUNTER SYMPTOMS
RHINORRHEA: 1
NAUSEA: 0
DIARRHEA: 0
SINUS PRESSURE: 1
VOMITING: 0
COUGH: 1
SORE THROAT: 1
ABDOMINAL PAIN: 0

## 2019-12-11 RX ORDER — FAMOTIDINE 20 MG/1
20 TABLET, FILM COATED ORAL NIGHTLY
Qty: 30 TABLET | Refills: 5 | Status: SHIPPED | OUTPATIENT
Start: 2019-12-11 | End: 2020-07-06

## 2019-12-19 DIAGNOSIS — I10 ESSENTIAL HYPERTENSION: ICD-10-CM

## 2019-12-20 RX ORDER — DOXAZOSIN MESYLATE 4 MG/1
4 TABLET ORAL NIGHTLY
Qty: 90 TABLET | Refills: 0 | Status: SHIPPED | OUTPATIENT
Start: 2019-12-20 | End: 2020-03-19 | Stop reason: SDUPTHER

## 2019-12-27 ENCOUNTER — HOSPITAL ENCOUNTER (EMERGENCY)
Age: 69
Discharge: HOME | End: 2019-12-27
Attending: EMERGENCY MEDICINE
Payer: MEDICARE

## 2019-12-27 ENCOUNTER — TELEPHONE (OUTPATIENT)
Dept: FAMILY MEDICINE CLINIC | Age: 69
End: 2019-12-27

## 2019-12-27 VITALS
DIASTOLIC BLOOD PRESSURE: 75 MMHG | HEART RATE: 63 BPM | SYSTOLIC BLOOD PRESSURE: 120 MMHG | RESPIRATION RATE: 16 BRPM | TEMPERATURE: 98.2 F | OXYGEN SATURATION: 96 %

## 2019-12-27 DIAGNOSIS — N17.9 ACUTE KIDNEY INJURY (HCC): ICD-10-CM

## 2019-12-27 DIAGNOSIS — B34.9 VIRAL SYNDROME: ICD-10-CM

## 2019-12-27 DIAGNOSIS — R53.1 GENERALIZED WEAKNESS: Primary | ICD-10-CM

## 2019-12-27 LAB
ALBUMIN SERPL-MCNC: 3 GM/DL (ref 3.4–5)
ALP BLD-CCNC: 41 U/L (ref 46–116)
ALT SERPL-CCNC: 25 U/L (ref 14–63)
ANION GAP: 9 MEQ/L (ref 8–16)
AST SERPL-CCNC: 13 U/L (ref 15–37)
BASOPHILS # BLD: 0.4 % (ref 0–3)
BILIRUB SERPL-MCNC: 0.4 MG/DL (ref 0.2–1)
BUN BLDV-MCNC: 26 MG/DL (ref 7–18)
CHLORIDE BLD-SCNC: 102 MEQ/L (ref 98–107)
CO2: 28 MEQ/L (ref 21–32)
CREAT SERPL-MCNC: 1.7 MG/DL (ref 0.6–1.3)
EOSINOPHILS RELATIVE PERCENT: 1.7 % (ref 0–4)
FLU A ANTIGEN: NEGATIVE
FLU B ANTIGEN: NEGATIVE
GFR, ESTIMATED: 43 ML/MIN/1.73M2
GLUCOSE BLD-MCNC: 117 MG/DL (ref 74–106)
HCT VFR BLD CALC: 35.5 % (ref 42–52)
HEMOGLOBIN: 11.4 GM/DL (ref 14–18)
LYMPHOCYTES # BLD: 30.9 % (ref 15–47)
MCH RBC QN AUTO: 27.8 PG (ref 27–31)
MCHC RBC AUTO-ENTMCNC: 32.1 GM/DL (ref 33–37)
MCV RBC AUTO: 86.6 FL (ref 80–94)
MONOCYTES: 13 % (ref 0–12)
PDW BLD-RTO: 13.4 % (ref 11.5–14.5)
PLATELET # BLD: 208 THOU/MM3 (ref 130–400)
PMV BLD AUTO: 7.8 FL (ref 7.4–10.4)
POC CALCIUM: 9.6 MG/DL (ref 8.5–10.1)
POTASSIUM SERPL-SCNC: 3.9 MEQ/L (ref 3.5–5.1)
RBC # BLD: 4.1 MILL/MM3 (ref 4.7–6.1)
SEGS: 54 % (ref 43–75)
SODIUM BLD-SCNC: 139 MEQ/L (ref 136–145)
TOTAL PROTEIN: 6.3 GM/DL (ref 6.4–8.2)
WBC # BLD: 4.2 THOU/MM3 (ref 4.8–10.8)

## 2019-12-27 PROCEDURE — 87804 INFLUENZA ASSAY W/OPTIC: CPT

## 2019-12-27 PROCEDURE — 6360000002 HC RX W HCPCS: Performed by: EMERGENCY MEDICINE

## 2019-12-27 PROCEDURE — 36415 COLL VENOUS BLD VENIPUNCTURE: CPT

## 2019-12-27 PROCEDURE — 96374 THER/PROPH/DIAG INJ IV PUSH: CPT

## 2019-12-27 PROCEDURE — 2580000003 HC RX 258: Performed by: EMERGENCY MEDICINE

## 2019-12-27 PROCEDURE — 99284 EMERGENCY DEPT VISIT MOD MDM: CPT

## 2019-12-27 PROCEDURE — 2709999900 HC NON-CHARGEABLE SUPPLY

## 2019-12-27 PROCEDURE — 80053 COMPREHEN METABOLIC PANEL: CPT

## 2019-12-27 PROCEDURE — 85025 COMPLETE CBC W/AUTO DIFF WBC: CPT

## 2019-12-27 RX ORDER — KETOROLAC TROMETHAMINE 30 MG/ML
15 INJECTION, SOLUTION INTRAMUSCULAR; INTRAVENOUS ONCE
Status: COMPLETED | OUTPATIENT
Start: 2019-12-27 | End: 2019-12-27

## 2019-12-27 RX ORDER — 0.9 % SODIUM CHLORIDE 0.9 %
1000 INTRAVENOUS SOLUTION INTRAVENOUS ONCE
Status: COMPLETED | OUTPATIENT
Start: 2019-12-27 | End: 2019-12-27

## 2019-12-27 RX ADMIN — KETOROLAC TROMETHAMINE 15 MG: 30 INJECTION, SOLUTION INTRAMUSCULAR at 11:40

## 2019-12-27 RX ADMIN — SODIUM CHLORIDE 1000 ML: 9 INJECTION, SOLUTION INTRAVENOUS at 11:40

## 2019-12-27 ASSESSMENT — PAIN SCALES - GENERAL: PAINLEVEL_OUTOF10: 1

## 2019-12-27 ASSESSMENT — PAIN DESCRIPTION - PAIN TYPE: TYPE: ACUTE PAIN

## 2019-12-30 ENCOUNTER — TELEPHONE (OUTPATIENT)
Dept: FAMILY MEDICINE CLINIC | Age: 69
End: 2019-12-30

## 2020-01-06 RX ORDER — GLIMEPIRIDE 4 MG/1
TABLET ORAL
Qty: 135 TABLET | Refills: 3 | Status: SHIPPED | OUTPATIENT
Start: 2020-01-06 | End: 2020-07-22 | Stop reason: SDUPTHER

## 2020-01-21 ENCOUNTER — TELEPHONE (OUTPATIENT)
Dept: FAMILY MEDICINE CLINIC | Age: 70
End: 2020-01-21

## 2020-01-21 RX ORDER — OSELTAMIVIR PHOSPHATE 30 MG/1
30 CAPSULE ORAL DAILY
Qty: 7 CAPSULE | Refills: 0 | Status: SHIPPED | OUTPATIENT
Start: 2020-01-21 | End: 2020-01-28

## 2020-01-21 NOTE — TELEPHONE ENCOUNTER
Tamiflu may help. With his health issues is reasonable to start. Script for tamiflu sent in. Preventative dose is ONCE daily x 7 days. He needs lower dose than typical as well due to his kidney function. Call wife.

## 2020-01-21 NOTE — TELEPHONE ENCOUNTER
Pt was seen yesterday and diagnosed with influenza B - she is asking if her  would benefit from Tamiflu - if so, please send to [facility]

## 2020-01-28 ENCOUNTER — OFFICE VISIT (OUTPATIENT)
Dept: PULMONOLOGY | Age: 70
End: 2020-01-28
Payer: MEDICARE

## 2020-01-28 VITALS
WEIGHT: 191.4 LBS | BODY MASS INDEX: 27.4 KG/M2 | HEART RATE: 68 BPM | SYSTOLIC BLOOD PRESSURE: 130 MMHG | OXYGEN SATURATION: 98 % | DIASTOLIC BLOOD PRESSURE: 78 MMHG | HEIGHT: 70 IN

## 2020-01-28 PROCEDURE — 1036F TOBACCO NON-USER: CPT | Performed by: PHYSICIAN ASSISTANT

## 2020-01-28 PROCEDURE — G8482 FLU IMMUNIZE ORDER/ADMIN: HCPCS | Performed by: PHYSICIAN ASSISTANT

## 2020-01-28 PROCEDURE — 3017F COLORECTAL CA SCREEN DOC REV: CPT | Performed by: PHYSICIAN ASSISTANT

## 2020-01-28 PROCEDURE — 4040F PNEUMOC VAC/ADMIN/RCVD: CPT | Performed by: PHYSICIAN ASSISTANT

## 2020-01-28 PROCEDURE — G8427 DOCREV CUR MEDS BY ELIG CLIN: HCPCS | Performed by: PHYSICIAN ASSISTANT

## 2020-01-28 PROCEDURE — 99213 OFFICE O/P EST LOW 20 MIN: CPT | Performed by: PHYSICIAN ASSISTANT

## 2020-01-28 PROCEDURE — G8417 CALC BMI ABV UP PARAM F/U: HCPCS | Performed by: PHYSICIAN ASSISTANT

## 2020-01-28 PROCEDURE — 1123F ACP DISCUSS/DSCN MKR DOCD: CPT | Performed by: PHYSICIAN ASSISTANT

## 2020-01-28 ASSESSMENT — ENCOUNTER SYMPTOMS
EYES NEGATIVE: 1
DIARRHEA: 0
STRIDOR: 0
COUGH: 0
BACK PAIN: 0
CHEST TIGHTNESS: 0
NAUSEA: 0
ALLERGIC/IMMUNOLOGIC NEGATIVE: 1
SHORTNESS OF BREATH: 0
WHEEZING: 0

## 2020-01-28 NOTE — PROGRESS NOTES
[facility]      Fabricio Molina         [id number]  1/28/2020   Chief Complaint   Patient presents with    Follow-up     LEW 12 mo f/u with [facility] download        Pt of Dr. Reginaldo Rose    PAP Download:   Jourdan Oreilly or initial AHI: 28.7     Date of initial study: 8/19/15      Compliant  100%     Noncompliant 0 %     PAP Type BiPAP Level  19/15   Avg Hrs/Day 7 hr 57 min  AHI: 0.2   Recorded compliance dates ,   12/27/19-1/25/2020   Machine/Mfg: Resmed Interface: FFM    Provider:    _x_[id number]           Allyne Pastures        __ Efraín Aston    __ [address]            __[facility]   __[place]:       __Other    Neck Size: 15.75  Mallampati 4  ESS:  10  SAQLI:     Here is a scan of the most recent download:            Presentation:   Patricia Wall presents for sleep medicine follow up for obstructive sleep apnea  Since the last visit, Patricia Wall is doing well with PAP. Mask is fitting well. He feels rested. Equipment issues: The pressure is  acceptable, the mask is acceptable     Sleep issues:  Do you feel better? Yes  More rested? Yes   Better concentration? yes    Progress History:   Since last visit any new medical issues? Yes dehydration  New ER or hospitlal visits? Yes   Any new or changes in medicines? No  Any new sleep medicines?  No        Past Medical History:   Diagnosis Date    Erectile dysfunction     Hyperlipidemia     Hypertension     LEW treated with BiPAP     Type 2 diabetes mellitus ([facility] [address] 2000       Past Surgical History:   Procedure Laterality Date    APPENDECTOMY  as teenager    BACK SURGERY  1988    herniated disc- L5-S1    CATARACT REMOVAL Bilateral     COLONOSCOPY  07/2016    ENDOSCOPIC ULTRASOUND (UPPER)  02/22/2017    EYE SURGERY Right 2005    TONSILLECTOMY      as teenager    UPPER GASTROINTESTINAL ENDOSCOPY         Social History     Tobacco Use    Smoking status: Former Smoker     Packs/day: 1.25     Years: 20.00     Pack years: 25.00     Last systems reviewed and are negative. Physical Exam:    BMI:  Body mass index is 27.86 kg/m². Wt Readings from Last 3 Encounters:   01/28/20 191 lb 6.4 oz (86.8 kg)   11/11/19 191 lb 3.2 oz (86.7 kg)   11/05/19 190 lb 12.8 oz (86.5 kg)     Weight stable / unchanged  Vitals: /78 (Site: Right Upper Arm, Position: Sitting, Cuff Size: Medium Adult)   Pulse 68   Ht 5' 9.5\" (1.765 m)   Wt 191 lb 6.4 oz (86.8 kg)   SpO2 98% Comment: on room air  BMI 27.86 kg/m²       Physical Exam  Constitutional:       Appearance: He is well-developed. HENT:      Head: Normocephalic and atraumatic. Right Ear: External ear normal.      Left Ear: External ear normal.   Eyes:      Conjunctiva/sclera: Conjunctivae normal.      Pupils: Pupils are equal, round, and reactive to light. Neck:      Musculoskeletal: Normal range of motion and neck supple. Cardiovascular:      Rate and Rhythm: Normal rate and regular rhythm. Heart sounds: Normal heart sounds. Pulmonary:      Effort: Pulmonary effort is normal.      Breath sounds: Normal breath sounds. Musculoskeletal: Normal range of motion. Skin:     General: Skin is warm and dry. Neurological:      Mental Status: He is alert and oriented to person, place, and time. Psychiatric:         Behavior: Behavior normal.         Thought Content: Thought content normal.         Judgment: Judgment normal.           ASSESSMENT/DIAGNOSIS     Diagnosis Orders   1. LEW treated with BiPAP              Plan   Do you need any equipment today? Yes update supplies    - He  was advised to continue current positive airway pressure therapy with above described pressure.    - He  advised to keep good compliance with current recommended pressure to get optimal results and clinical improvement  - Recommend 7-9 hours of sleep with PAP  - He was advised to call [facility] regarding supplies if needed.   -He call my office for earlier appointment if needed for worsening of sleep symptoms.   - He was instructed on weight loss  - [employer] Grimes was educated about my impression and plan. Patient verbalizesunderstanding.   We will see Giana Slaughter back in: 1 year with download    Information added by my medical assistant/LPN was reviewed today         Corey Mcintosh PA-C, MPAS  1/28/2020

## 2020-03-06 ENCOUNTER — TELEPHONE (OUTPATIENT)
Dept: FAMILY MEDICINE CLINIC | Age: 70
End: 2020-03-06

## 2020-03-09 RX ORDER — CARVEDILOL 25 MG/1
TABLET ORAL
Qty: 180 TABLET | Refills: 3 | Status: SHIPPED | OUTPATIENT
Start: 2020-03-09 | End: 2021-03-02 | Stop reason: ALTCHOICE

## 2020-03-09 RX ORDER — CARVEDILOL 25 MG/1
TABLET ORAL
Qty: 180 TABLET | Refills: 3 | Status: SHIPPED | OUTPATIENT
Start: 2020-03-09 | End: 2021-03-02 | Stop reason: SDUPTHER

## 2020-03-09 NOTE — TELEPHONE ENCOUNTER
Last visit- 11/11/2019  Next visit- Visit date not found    Requested Prescriptions     Pending Prescriptions Disp Refills    carvedilol (COREG) 25 MG tablet 180 tablet 3     Sig: TAKE ONE TABLET BY MOUTH TWICE DAILY

## 2020-03-13 ENCOUNTER — TELEPHONE (OUTPATIENT)
Dept: FAMILY MEDICINE CLINIC | Age: 70
End: 2020-03-13

## 2020-03-13 NOTE — TELEPHONE ENCOUNTER
Called and spoke with pt, aware of message, verbalized understanding with no concerns voiced. Pt stated that he will stop in office on Monday to have labs drawn.

## 2020-03-13 NOTE — TELEPHONE ENCOUNTER
Luma Mccartnye called requesting a refill on the following medications:  Requested Prescriptions     Pending Prescriptions Disp Refills    metFORMIN (GLUCOPHAGE) 1000 MG tablet 180 tablet 3     Sig: TAKE ONE TABLET BY MOUTH TWICE DAILY WITH MEALS    metFORMIN (GLUCOPHAGE) 500 MG tablet 90 tablet 3     Sig: Take 1 tablet by mouth every evening With 1000mg tab for total of 1500mg     Pharmacy verified:  [id number] [facility]    Date of last visit: 11-11-19  Date of next visit (if applicable): Visit date not found

## 2020-03-16 ENCOUNTER — TELEPHONE (OUTPATIENT)
Dept: FAMILY MEDICINE CLINIC | Age: 70
End: 2020-03-16

## 2020-03-16 NOTE — TELEPHONE ENCOUNTER
I'd prefer if he didn't wait a whole month to do them. If possible, maybe he could do them all together in 1-2 weeks instead.     Call pt's wife

## 2020-03-19 RX ORDER — DOXAZOSIN MESYLATE 4 MG/1
4 TABLET ORAL NIGHTLY
Qty: 90 TABLET | Refills: 3 | Status: SHIPPED | OUTPATIENT
Start: 2020-03-19 | End: 2021-03-02 | Stop reason: SDUPTHER

## 2020-03-31 ENCOUNTER — TELEPHONE (OUTPATIENT)
Dept: FAMILY MEDICINE CLINIC | Age: 70
End: 2020-03-31

## 2020-03-31 RX ORDER — AMOXICILLIN AND CLAVULANATE POTASSIUM 875; 125 MG/1; MG/1
1 TABLET, FILM COATED ORAL 2 TIMES DAILY
Qty: 20 TABLET | Refills: 0 | Status: SHIPPED | OUTPATIENT
Start: 2020-03-31 | End: 2020-04-10

## 2020-03-31 NOTE — TELEPHONE ENCOUNTER
Wife sent mychart message in her chart noting she and pt both with symptoms suggestive of bacterial sinusitis x 2 weeks.     Script sent for augmentin

## 2020-04-01 ENCOUNTER — HOSPITAL ENCOUNTER (OUTPATIENT)
Age: 70
Discharge: HOME | End: 2020-04-01
Payer: MEDICARE

## 2020-04-01 LAB
ANION GAP SERPL CALCULATED.3IONS-SCNC: 11 MEQ/L (ref 8–16)
BUN BLDV-MCNC: 19 MG/DL (ref 7–22)
CALCIUM SERPL-MCNC: 10.9 MG/DL (ref 8.5–10.5)
CHLORIDE BLD-SCNC: 101 MEQ/L (ref 98–111)
CO2: 30 MEQ/L (ref 23–33)
CREAT SERPL-MCNC: 0.9 MG/DL (ref 0.4–1.2)
GFR SERPL CREATININE-BSD FRML MDRD: 84 ML/MIN/1.73M2
GLUCOSE BLD-MCNC: 145 MG/DL (ref 70–108)
PHOSPHORUS: 2.7 MG/DL (ref 2.4–4.7)
POTASSIUM SERPL-SCNC: 4.9 MEQ/L (ref 3.5–5.2)
PTH INTACT: 52.6 PG/ML (ref 15–65)
SODIUM BLD-SCNC: 142 MEQ/L (ref 135–145)
VITAMIN D 25-HYDROXY: 26 NG/ML (ref 30–100)

## 2020-04-01 PROCEDURE — 83970 ASSAY OF PARATHORMONE: CPT

## 2020-04-01 PROCEDURE — 36415 COLL VENOUS BLD VENIPUNCTURE: CPT

## 2020-04-01 PROCEDURE — 80048 BASIC METABOLIC PNL TOTAL CA: CPT

## 2020-04-01 PROCEDURE — 82306 VITAMIN D 25 HYDROXY: CPT

## 2020-04-01 PROCEDURE — 84100 ASSAY OF PHOSPHORUS: CPT

## 2020-06-08 RX ORDER — PRAVASTATIN SODIUM 20 MG
TABLET ORAL
Qty: 90 TABLET | Refills: 0 | Status: SHIPPED | OUTPATIENT
Start: 2020-06-08 | End: 2020-09-15 | Stop reason: SDUPTHER

## 2020-07-06 ENCOUNTER — OFFICE VISIT (OUTPATIENT)
Dept: FAMILY MEDICINE CLINIC | Age: 70
End: 2020-07-06
Payer: MEDICARE

## 2020-07-06 ENCOUNTER — TELEPHONE (OUTPATIENT)
Dept: FAMILY MEDICINE CLINIC | Age: 70
End: 2020-07-06

## 2020-07-06 ENCOUNTER — HOSPITAL ENCOUNTER (OUTPATIENT)
Age: 70
Discharge: HOME | End: 2020-07-06
Payer: MEDICARE

## 2020-07-06 VITALS
RESPIRATION RATE: 12 BRPM | TEMPERATURE: 97.4 F | WEIGHT: 193 LBS | BODY MASS INDEX: 27.63 KG/M2 | HEIGHT: 70 IN | HEART RATE: 66 BPM | SYSTOLIC BLOOD PRESSURE: 138 MMHG | DIASTOLIC BLOOD PRESSURE: 82 MMHG

## 2020-07-06 DIAGNOSIS — J02.9 SORE THROAT: ICD-10-CM

## 2020-07-06 LAB
GROUP A STREP CULTURE, REFLEX: NEGATIVE
REFLEX THROAT C + S: NORMAL

## 2020-07-06 PROCEDURE — 1036F TOBACCO NON-USER: CPT | Performed by: FAMILY MEDICINE

## 2020-07-06 PROCEDURE — 3046F HEMOGLOBIN A1C LEVEL >9.0%: CPT | Performed by: FAMILY MEDICINE

## 2020-07-06 PROCEDURE — G8417 CALC BMI ABV UP PARAM F/U: HCPCS | Performed by: FAMILY MEDICINE

## 2020-07-06 PROCEDURE — 87070 CULTURE OTHR SPECIMN AEROBIC: CPT

## 2020-07-06 PROCEDURE — 1123F ACP DISCUSS/DSCN MKR DOCD: CPT | Performed by: FAMILY MEDICINE

## 2020-07-06 PROCEDURE — 3017F COLORECTAL CA SCREEN DOC REV: CPT | Performed by: FAMILY MEDICINE

## 2020-07-06 PROCEDURE — U0003 INFECTIOUS AGENT DETECTION BY NUCLEIC ACID (DNA OR RNA); SEVERE ACUTE RESPIRATORY SYNDROME CORONAVIRUS 2 (SARS-COV-2) (CORONAVIRUS DISEASE [COVID-19]), AMPLIFIED PROBE TECHNIQUE, MAKING USE OF HIGH THROUGHPUT TECHNOLOGIES AS DESCRIBED BY CMS-2020-01-R: HCPCS

## 2020-07-06 PROCEDURE — 99214 OFFICE O/P EST MOD 30 MIN: CPT | Performed by: FAMILY MEDICINE

## 2020-07-06 PROCEDURE — 2022F DILAT RTA XM EVC RTNOPTHY: CPT | Performed by: FAMILY MEDICINE

## 2020-07-06 PROCEDURE — 4040F PNEUMOC VAC/ADMIN/RCVD: CPT | Performed by: FAMILY MEDICINE

## 2020-07-06 PROCEDURE — G8427 DOCREV CUR MEDS BY ELIG CLIN: HCPCS | Performed by: FAMILY MEDICINE

## 2020-07-06 PROCEDURE — 87880 STREP A ASSAY W/OPTIC: CPT

## 2020-07-06 RX ORDER — HYDROCHLOROTHIAZIDE 12.5 MG/1
CAPSULE, GELATIN COATED ORAL
Qty: 90 CAPSULE | Refills: 3 | Status: SHIPPED | OUTPATIENT
Start: 2020-07-06 | End: 2021-06-30

## 2020-07-06 RX ORDER — HYDROCHLOROTHIAZIDE 12.5 MG/1
CAPSULE, GELATIN COATED ORAL
Qty: 90 CAPSULE | Refills: 0 | Status: SHIPPED | OUTPATIENT
Start: 2020-07-06 | End: 2020-07-06 | Stop reason: SDUPTHER

## 2020-07-06 SDOH — ECONOMIC STABILITY: FOOD INSECURITY: WITHIN THE PAST 12 MONTHS, YOU WORRIED THAT YOUR FOOD WOULD RUN OUT BEFORE YOU GOT MONEY TO BUY MORE.: NEVER TRUE

## 2020-07-06 SDOH — ECONOMIC STABILITY: TRANSPORTATION INSECURITY
IN THE PAST 12 MONTHS, HAS THE LACK OF TRANSPORTATION KEPT YOU FROM MEDICAL APPOINTMENTS OR FROM GETTING MEDICATIONS?: NO

## 2020-07-06 SDOH — ECONOMIC STABILITY: FOOD INSECURITY: WITHIN THE PAST 12 MONTHS, THE FOOD YOU BOUGHT JUST DIDN'T LAST AND YOU DIDN'T HAVE MONEY TO GET MORE.: NEVER TRUE

## 2020-07-06 SDOH — ECONOMIC STABILITY: INCOME INSECURITY: HOW HARD IS IT FOR YOU TO PAY FOR THE VERY BASICS LIKE FOOD, HOUSING, MEDICAL CARE, AND HEATING?: NOT HARD AT ALL

## 2020-07-06 ASSESSMENT — PATIENT HEALTH QUESTIONNAIRE - PHQ9
SUM OF ALL RESPONSES TO PHQ QUESTIONS 1-9: 0
2. FEELING DOWN, DEPRESSED OR HOPELESS: 0
SUM OF ALL RESPONSES TO PHQ QUESTIONS 1-9: 0
1. LITTLE INTEREST OR PLEASURE IN DOING THINGS: 0
SUM OF ALL RESPONSES TO PHQ9 QUESTIONS 1 & 2: 0

## 2020-07-06 NOTE — PROGRESS NOTES
Mehul Henderson is a 72 y.o. male who presents today for:   Chief Complaint   Patient presents with    Pharyngitis     last 3-4 days         HPI:      HPI    Last a1c 7.0 in March  Rarely checking sugars    C/o not feelig well past few days  PND x 2 mos intermittently  Takes otc meds   Sore throat past 3-4 days  Thinks related to cpap   No fever  Mild cough-  Feels like related to PND  No seasonal allergies  flonase no help  No known COVID exposure     GERD- takes prn omeprazole  Doesn't feel gerd worse past few days    Hypercalemia- Dr. Lincoln Lee discussed possible referral to surgeon/specialist, but given has been stable, sounds like monitoring. Patient's medications, allergies, past medical, surgical, social,and family histories were reviewed and updated as appropriate.     Outpatient Medications Prior to Visit   Medication Sig Dispense Refill    pravastatin (PRAVACHOL) 20 MG tablet TAKE 1 TABLET BY MOUTH IN THE EVENING FOR  CHOLESTEROL 90 tablet 0    doxazosin (CARDURA) 4 MG tablet Take 1 tablet by mouth nightly 90 tablet 3    metFORMIN (GLUCOPHAGE) 1000 MG tablet TAKE ONE TABLET BY MOUTH TWICE DAILY WITH MEALS 180 tablet 1    metFORMIN (GLUCOPHAGE) 500 MG tablet Take 1 tablet by mouth every evening With 1000mg tab for total of 1500mg 90 tablet 1    carvedilol (COREG) 25 MG tablet Take 1 tablet by mouth twice daily 180 tablet 3    carvedilol (COREG) 25 MG tablet TAKE ONE TABLET BY MOUTH TWICE DAILY 180 tablet 3    candesartan (ATACAND) 32 MG tablet Take 1 tablet by mouth once daily for blood pressure 30 tablet 11    glimepiride (AMARYL) 4 MG tablet TAKE 1 TABLET BY MOUTH IN THE MORNING AND 1/2 (ONE-HALF) IN THE EVENING WITH SUPPER 135 tablet 3    Dextromethorphan-guaiFENesin (TUSSIN DM PO) Take by mouth Indications: Patient is taking 1 tsp prn      ibuprofen (ADVIL;MOTRIN) 200 MG tablet Take 400 mg by mouth every 6 hours as needed for Pain      albuterol sulfate HFA (VENTOLIN HFA) 108 (90 Base) dry.      Findings: No rash. Neurological:      Mental Status: He is alert and oriented to person, place, and time. Assessment/Plan:      Diagnosis Orders   1. Sore throat  COVID-19 Ambulatory   2. Controlled type 2 diabetes mellitus without complication, without long-term current use of insulin ([address]     3. Hyperparathyroidism [facility])         Get records from dr. Elida Steiner for a1c and lipids and Ca    See orders  Strep neg  symptotic treatment discussed  Advised to return to office for appointment or call if symptoms not improving in several days. Patient given educational materials- see patient instructions. Discussed use, benefit, and side effects of prescribedmedications. All patient questions answered. Pt voiced understanding. Reviewedhealth maintenance. Discussed medications, diet and exercise. Patient agreed withtreatment plan. Follow up as directed. Return in about 4 months (around 11/6/2020) for AWV; yearly; DMII.       (Please notethat portions of this note were completed with a voice recognition program. Effortswere made to edit the dictations but occasionally words are mis-transcribed.)

## 2020-07-06 NOTE — TELEPHONE ENCOUNTER
Pt's wife called office stating that pt has been having a sore throat for 3-4 days and is getting worse. He is having some discomfort when swallowing stating that he feels like a \"glob\" is stuck. Wife also stated that discomfort marina goes up under his ears. Pt denies having any other symptoms at this time. Pt has been taking allergy meds and throat lozenges with little to no relief. Wife is asking for an appt for pt to be seen in office or something called in to [address] [facility].

## 2020-07-06 NOTE — TELEPHONE ENCOUNTER
See if could do a video visit at 1:20 or 3:40 today. If not able to do a video visit, can put in the red/sick visit at end of day 3:40.

## 2020-07-07 ENCOUNTER — TELEPHONE (OUTPATIENT)
Dept: FAMILY MEDICINE CLINIC | Age: 70
End: 2020-07-07

## 2020-07-07 RX ORDER — AZITHROMYCIN 250 MG/1
TABLET, FILM COATED ORAL
Qty: 6 TABLET | Refills: 0 | Status: SHIPPED | OUTPATIENT
Start: 2020-07-07 | End: 2020-07-21

## 2020-07-07 RX ORDER — PREDNISONE 20 MG/1
TABLET ORAL
Qty: 7 TABLET | Refills: 0 | Status: SHIPPED | OUTPATIENT
Start: 2020-07-07 | End: 2020-07-21

## 2020-07-07 RX ORDER — ALBUTEROL SULFATE 90 UG/1
2 AEROSOL, METERED RESPIRATORY (INHALATION) EVERY 4 HOURS PRN
Qty: 1 INHALER | Refills: 0 | Status: SHIPPED | OUTPATIENT
Start: 2020-07-07 | End: 2022-04-18

## 2020-07-07 NOTE — TELEPHONE ENCOUNTER
Feeling worse today. Has started coughing, occ mild wheezing. Started with fever with tmax of 100. Sore throat is worst symptom. Tylenol and tried. + body aches. No known covid contacts. Decreased appetite. Trouble eating and drinking due to throat pain. Script for albuterol inhaler and spacer, azithromycin sent, + prednisone. Advised to check sugars prior to starting prednisone though and if already running high >200 to talk with me prior to starting prednisone. Advised if develops worsening sob, dehydration, to go to ER. If not improve, to let me know    Discussed with wife isolating him away from others in house as much as possible, wearing mask when around others in house, proning position, walking several times per day.   Awaiting COVID test results

## 2020-07-08 ENCOUNTER — TELEPHONE (OUTPATIENT)
Dept: FAMILY MEDICINE CLINIC | Age: 70
End: 2020-07-08

## 2020-07-08 LAB — THROAT/NOSE CULTURE: NORMAL

## 2020-07-08 ASSESSMENT — ENCOUNTER SYMPTOMS
COUGH: 1
CONSTIPATION: 0
BLOOD IN STOOL: 0
RHINORRHEA: 0
SORE THROAT: 1
WHEEZING: 0
SHORTNESS OF BREATH: 0
ABDOMINAL PAIN: 0
VOMITING: 0
STRIDOR: 0
NAUSEA: 0
DIARRHEA: 0

## 2020-07-08 NOTE — TELEPHONE ENCOUNTER
----- Message from Katherine Durand MD sent at 7/8/2020 12:19 PM EDT -----  Final throat culture returned normal  COVID test still pending  See how pt is feeling today  Call pt/wife

## 2020-07-08 NOTE — TELEPHONE ENCOUNTER
Pt's wife called office wanting to give update to PCP on vitals-     Pulse Ox- 96%  P- 71    Wife is wondering how low is to low for pt? She asked this only because when pt woke up yesterday his lips were purple. She said that he recovered quickly but that it scared her. No other concerns were voiced on message.

## 2020-07-09 ENCOUNTER — HOSPITAL ENCOUNTER (OUTPATIENT)
Dept: GENERAL RADIOLOGY | Age: 70
Discharge: HOME | End: 2020-07-09
Payer: MEDICARE

## 2020-07-09 ENCOUNTER — HOSPITAL ENCOUNTER (OUTPATIENT)
Age: 70
Discharge: HOME | End: 2020-07-09
Payer: MEDICARE

## 2020-07-09 LAB — SARS-COV-2: NOT DETECTED

## 2020-07-09 PROCEDURE — 71046 X-RAY EXAM CHEST 2 VIEWS: CPT

## 2020-07-09 NOTE — TELEPHONE ENCOUNTER
Patients wife aware, voiced understanding with no concerns voiced. He is feeling better today. Able to eat today and walk out to the garden. Patient also able to swallow today without holding his neck. His pulse ox after waking today was 93% and after some deep breaths it increased. No concerns voiced.

## 2020-07-09 NOTE — TELEPHONE ENCOUNTER
Noted.  Let them know that COVID test return normal.  If would persist with shortness of breath, fatigue and not feeling well, should still get cxr done and let me know as COVID test is only about 70% sensitive. So there is still a chance patient could have COVID and I would retest if symptoms persisted. Recommend still staying at home for next several days to see how his symptoms do.     Call pt/wife

## 2020-07-13 ENCOUNTER — TELEPHONE (OUTPATIENT)
Dept: FAMILY MEDICINE CLINIC | Age: 70
End: 2020-07-13

## 2020-07-13 RX ORDER — AMOXICILLIN AND CLAVULANATE POTASSIUM 875; 125 MG/1; MG/1
1 TABLET, FILM COATED ORAL 2 TIMES DAILY
Qty: 20 TABLET | Refills: 0 | Status: SHIPPED | OUTPATIENT
Start: 2020-07-13 | End: 2020-07-21

## 2020-07-14 ENCOUNTER — HOSPITAL ENCOUNTER (OUTPATIENT)
Age: 70
Discharge: HOME | End: 2020-07-14
Payer: MEDICARE

## 2020-07-14 DIAGNOSIS — R50.9 FEVER, UNSPECIFIED FEVER CAUSE: ICD-10-CM

## 2020-07-14 DIAGNOSIS — R05.9 COUGH: ICD-10-CM

## 2020-07-14 PROCEDURE — U0003 INFECTIOUS AGENT DETECTION BY NUCLEIC ACID (DNA OR RNA); SEVERE ACUTE RESPIRATORY SYNDROME CORONAVIRUS 2 (SARS-COV-2) (CORONAVIRUS DISEASE [COVID-19]), AMPLIFIED PROBE TECHNIQUE, MAKING USE OF HIGH THROUGHPUT TECHNOLOGIES AS DESCRIBED BY CMS-2020-01-R: HCPCS

## 2020-07-14 NOTE — TELEPHONE ENCOUNTER
Copied from wife's mychart msg regarding pt        Dean Even-   Here's the message I copied from what I had my staff call Ramón Horton with:  CXR shows no pneumonia   Shows mild atelectasis on left, which is where small lung airways are not fully open. Needs to work on taking deep breaths multiple times throughout the day to help open these up and decrease change of pneumonia developing. If he's not feeling better, he should actually get retested for COVID. The COVID test is only about 70% sensitive, meaning it can miss about 30 % of cases. Since he's still having symptoms, we need to make sure it still wouldn't be COVID. I've ordered it and he can do at the ambulatory care center again. In addition, I've sent in a different antibiotic- Augmentin to see if that may help him further. If it doesn't help, he needs seen again. Let me know if you have any questions. Erlinda Simon MD        ===View-only below this line===      ----- Message -----       From:Marlin Hensley Began       Sent:7/13/2020 11:23 AM EDT         Javi Jordan MD    Subject:RE: Non-Urgent Medical Question    Good morning! Ramón Horton is certainly not doing as well as I'd like. His throat is a bit better, but very sure, sounds very hoarse too. He has no energy, tires very easily. Did you get the chest x-ray results? Thanks so much.

## 2020-07-16 ENCOUNTER — TELEPHONE (OUTPATIENT)
Dept: FAMILY MEDICINE CLINIC | Age: 70
End: 2020-07-16

## 2020-07-16 LAB — SARS-COV-2: NOT DETECTED

## 2020-07-20 ENCOUNTER — PATIENT MESSAGE (OUTPATIENT)
Dept: FAMILY MEDICINE CLINIC | Age: 70
End: 2020-07-20

## 2020-07-20 NOTE — TELEPHONE ENCOUNTER
From: Lupis Alegria  To: Kane Seymour MD  Sent: 7/20/2020 11:15 AM EDT  Subject: Prescription Question    Good morning. I was taking the augmentin and developed very painful blisters all around the inside of my mouth. I discontinued the medication. I continue to have a lot of fatigue and my throat feels swollen.

## 2020-07-21 ENCOUNTER — TELEPHONE (OUTPATIENT)
Dept: FAMILY MEDICINE CLINIC | Age: 70
End: 2020-07-21

## 2020-07-21 ENCOUNTER — HOSPITAL ENCOUNTER (OUTPATIENT)
Age: 70
Discharge: HOME | End: 2020-07-21
Payer: MEDICARE

## 2020-07-21 DIAGNOSIS — K13.79 MOUTH SORES: ICD-10-CM

## 2020-07-21 DIAGNOSIS — J02.9 SORE THROAT: ICD-10-CM

## 2020-07-21 DIAGNOSIS — R53.83 OTHER FATIGUE: ICD-10-CM

## 2020-07-21 DIAGNOSIS — E11.9 CONTROLLED TYPE 2 DIABETES MELLITUS WITHOUT COMPLICATION, WITHOUT LONG-TERM CURRENT USE OF INSULIN (HCC): ICD-10-CM

## 2020-07-21 LAB
ALBUMIN SERPL-MCNC: 4.1 G/DL (ref 3.5–5.1)
ALP BLD-CCNC: 65 U/L (ref 38–126)
ALT SERPL-CCNC: 17 U/L (ref 11–66)
ANION GAP SERPL CALCULATED.3IONS-SCNC: 10 MEQ/L (ref 8–16)
AST SERPL-CCNC: 14 U/L (ref 5–40)
AVERAGE GLUCOSE: 174 MG/DL (ref 70–126)
BASOPHILS # BLD: 0.7 %
BASOPHILS ABSOLUTE: 0.1 THOU/MM3 (ref 0–0.1)
BILIRUB SERPL-MCNC: 0.3 MG/DL (ref 0.3–1.2)
BUN BLDV-MCNC: 18 MG/DL (ref 7–22)
CALCIUM SERPL-MCNC: 10.5 MG/DL (ref 8.5–10.5)
CHLORIDE BLD-SCNC: 102 MEQ/L (ref 98–111)
CO2: 30 MEQ/L (ref 23–33)
CREAT SERPL-MCNC: 0.9 MG/DL (ref 0.4–1.2)
EOSINOPHIL # BLD: 1.3 %
EOSINOPHILS ABSOLUTE: 0.1 THOU/MM3 (ref 0–0.4)
ERYTHROCYTE [DISTWIDTH] IN BLOOD BY AUTOMATED COUNT: 13.6 % (ref 11.5–14.5)
ERYTHROCYTE [DISTWIDTH] IN BLOOD BY AUTOMATED COUNT: 46 FL (ref 35–45)
GFR SERPL CREATININE-BSD FRML MDRD: 84 ML/MIN/1.73M2
GLUCOSE BLD-MCNC: 165 MG/DL (ref 70–108)
HBA1C MFR BLD: 7.8 % (ref 4.4–6.4)
HCT VFR BLD CALC: 37.2 % (ref 42–52)
HEMOGLOBIN: 11.5 GM/DL (ref 14–18)
IMMATURE GRANS (ABS): 0.06 THOU/MM3 (ref 0–0.07)
IMMATURE GRANULOCYTES: 0.7 %
LYMPHOCYTES # BLD: 22 %
LYMPHOCYTES ABSOLUTE: 2 THOU/MM3 (ref 1–4.8)
MCH RBC QN AUTO: 28.3 PG (ref 26–33)
MCHC RBC AUTO-ENTMCNC: 30.9 GM/DL (ref 32.2–35.5)
MCV RBC AUTO: 91.4 FL (ref 80–94)
MONOCYTES # BLD: 7.7 %
MONOCYTES ABSOLUTE: 0.7 THOU/MM3 (ref 0.4–1.3)
NUCLEATED RED BLOOD CELLS: 0 /100 WBC
PLATELET # BLD: 292 THOU/MM3 (ref 130–400)
PMV BLD AUTO: 10.3 FL (ref 9.4–12.4)
POTASSIUM SERPL-SCNC: 5 MEQ/L (ref 3.5–5.2)
RBC # BLD: 4.07 MILL/MM3 (ref 4.7–6.1)
SEDIMENTATION RATE, ERYTHROCYTE: 21 MM/HR (ref 0–10)
SEG NEUTROPHILS: 67.6 %
SEGMENTED NEUTROPHILS ABSOLUTE COUNT: 6.2 THOU/MM3 (ref 1.8–7.7)
SODIUM BLD-SCNC: 142 MEQ/L (ref 135–145)
TOTAL PROTEIN: 6.6 G/DL (ref 6.1–8)
TSH SERPL DL<=0.05 MIU/L-ACNC: 2.21 UIU/ML (ref 0.4–4.2)
WBC # BLD: 9.1 THOU/MM3 (ref 4.8–10.8)

## 2020-07-21 PROCEDURE — 86663 EPSTEIN-BARR ANTIBODY: CPT

## 2020-07-21 PROCEDURE — 84443 ASSAY THYROID STIM HORMONE: CPT

## 2020-07-21 PROCEDURE — 86665 EPSTEIN-BARR CAPSID VCA: CPT

## 2020-07-21 PROCEDURE — 86664 EPSTEIN-BARR NUCLEAR ANTIGEN: CPT

## 2020-07-21 PROCEDURE — 85025 COMPLETE CBC W/AUTO DIFF WBC: CPT

## 2020-07-21 PROCEDURE — 83036 HEMOGLOBIN GLYCOSYLATED A1C: CPT

## 2020-07-21 PROCEDURE — 80053 COMPREHEN METABOLIC PANEL: CPT

## 2020-07-21 PROCEDURE — 85651 RBC SED RATE NONAUTOMATED: CPT

## 2020-07-21 PROCEDURE — 36415 COLL VENOUS BLD VENIPUNCTURE: CPT

## 2020-07-22 RX ORDER — GLIMEPIRIDE 4 MG/1
4 TABLET ORAL 2 TIMES DAILY
Qty: 180 TABLET | Refills: 1 | Status: SHIPPED | OUTPATIENT
Start: 2020-07-22 | End: 2021-01-04

## 2020-07-24 LAB — EPSTEIN-BARR VIRUS ANTIBODIES: NORMAL

## 2020-08-07 ENCOUNTER — PATIENT MESSAGE (OUTPATIENT)
Dept: FAMILY MEDICINE CLINIC | Age: 70
End: 2020-08-07

## 2020-08-08 RX ORDER — DOXYCYCLINE HYCLATE 100 MG
100 TABLET ORAL 2 TIMES DAILY
Qty: 20 TABLET | Refills: 0 | Status: SHIPPED | OUTPATIENT
Start: 2020-08-08 | End: 2020-08-18

## 2020-08-11 LAB
REASON FOR REJECTION: NORMAL
REJECTED TEST: NORMAL

## 2020-09-14 ENCOUNTER — PATIENT MESSAGE (OUTPATIENT)
Dept: FAMILY MEDICINE CLINIC | Age: 70
End: 2020-09-14

## 2020-09-14 RX ORDER — SILDENAFIL CITRATE 20 MG/1
TABLET ORAL
Qty: 30 TABLET | Refills: 7 | Status: SHIPPED | OUTPATIENT
Start: 2020-09-14 | End: 2022-03-13 | Stop reason: SDUPTHER

## 2020-09-14 NOTE — TELEPHONE ENCOUNTER
From: Jeane Cabrera  To: Ahsan Mcnally MD  Sent: 9/14/2020 8:55 AM EDT  Subject: Prescription Question    Can I get a refill on the sildenafil?   Thanks

## 2020-09-15 ENCOUNTER — PATIENT MESSAGE (OUTPATIENT)
Dept: FAMILY MEDICINE CLINIC | Age: 70
End: 2020-09-15

## 2020-09-15 NOTE — TELEPHONE ENCOUNTER
From: Martina Oseguera  To: Nesha Freitas MD  Sent: 9/15/2020 8:27 AM EDT  Subject: Prescription Question    Good morning, I phoned the office lady Thursday about getting refills of Pravastatin 20mg and Magoxide 400mg, and Metformin. The pharmacy did not have the orders as of yesterday. Can I get refill please? Thank you!

## 2020-09-16 RX ORDER — PRAVASTATIN SODIUM 20 MG
TABLET ORAL
Qty: 90 TABLET | Refills: 0 | Status: SHIPPED | OUTPATIENT
Start: 2020-09-16 | End: 2020-12-02

## 2020-10-22 NOTE — PROGRESS NOTES
Note from pt's wife in her mychart:    \"Anita has been very ill and tested positive for covid-19. Before we knew she was positive, I took the kids as she could not care for them . On Sat the kids started with fevers 101 to nearly 103. My question, should Metta Walker and I get tested or wait to see if we get symptoms?    Thanks \"        COVID test ordered

## 2020-11-03 ENCOUNTER — HOSPITAL ENCOUNTER (OUTPATIENT)
Age: 70
Discharge: HOME | End: 2020-11-03
Payer: MEDICARE

## 2020-11-03 LAB
ANION GAP SERPL CALCULATED.3IONS-SCNC: 10 MEQ/L (ref 8–16)
AVERAGE GLUCOSE: 153 MG/DL (ref 70–126)
BUN BLDV-MCNC: 23 MG/DL (ref 7–22)
CALCIUM SERPL-MCNC: 10.9 MG/DL (ref 8.5–10.5)
CHLORIDE BLD-SCNC: 102 MEQ/L (ref 98–111)
CO2: 30 MEQ/L (ref 23–33)
CREAT SERPL-MCNC: 0.9 MG/DL (ref 0.4–1.2)
GFR SERPL CREATININE-BSD FRML MDRD: 84 ML/MIN/1.73M2
GLUCOSE BLD-MCNC: 135 MG/DL (ref 70–108)
HBA1C MFR BLD: 7.1 % (ref 4.4–6.4)
PHOSPHORUS: 2.9 MG/DL (ref 2.4–4.7)
POTASSIUM SERPL-SCNC: 4.4 MEQ/L (ref 3.5–5.2)
PTH INTACT: 54.6 PG/ML (ref 15–65)
SODIUM BLD-SCNC: 142 MEQ/L (ref 135–145)
VITAMIN D 25-HYDROXY: 28 NG/ML (ref 30–100)

## 2020-11-03 PROCEDURE — 82306 VITAMIN D 25 HYDROXY: CPT

## 2020-11-03 PROCEDURE — 36415 COLL VENOUS BLD VENIPUNCTURE: CPT

## 2020-11-03 PROCEDURE — 80048 BASIC METABOLIC PNL TOTAL CA: CPT

## 2020-11-03 PROCEDURE — 83036 HEMOGLOBIN GLYCOSYLATED A1C: CPT

## 2020-11-03 PROCEDURE — 83970 ASSAY OF PARATHORMONE: CPT

## 2020-11-03 PROCEDURE — 84100 ASSAY OF PHOSPHORUS: CPT

## 2020-11-04 ENCOUNTER — HOSPITAL ENCOUNTER (OUTPATIENT)
Age: 70
Setting detail: SPECIMEN
Discharge: HOME | End: 2020-11-04
Payer: MEDICARE

## 2020-11-04 LAB
CALCIUM URINE: 3.2 MG/DL
CALCIUM URINE: 67 MG/24HR (ref 100–240)
CREATININE URINE: 1.7 GM/24HR
CREATININE URINE: 82.4 MG/DL
HOURS COLLECTED: 24 HRS
HOURS COLLECTED: 24 HRS
URINE VOLUME MEASURE: 2100 ML
URINE VOLUME, 24 HOUR: 2100 ML

## 2020-11-04 PROCEDURE — 82340 ASSAY OF CALCIUM IN URINE: CPT

## 2020-11-04 PROCEDURE — 82570 ASSAY OF URINE CREATININE: CPT

## 2020-11-09 ENCOUNTER — TELEPHONE (OUTPATIENT)
Dept: FAMILY MEDICINE CLINIC | Age: 70
End: 2020-11-09

## 2020-11-10 ENCOUNTER — OFFICE VISIT (OUTPATIENT)
Dept: FAMILY MEDICINE CLINIC | Age: 70
End: 2020-11-10
Payer: MEDICARE

## 2020-11-10 VITALS
WEIGHT: 193.8 LBS | SYSTOLIC BLOOD PRESSURE: 118 MMHG | RESPIRATION RATE: 18 BRPM | HEIGHT: 69 IN | BODY MASS INDEX: 28.71 KG/M2 | TEMPERATURE: 97.1 F | HEART RATE: 72 BPM | DIASTOLIC BLOOD PRESSURE: 70 MMHG

## 2020-11-10 PROBLEM — N52.9 MALE ERECTILE DYSFUNCTION, UNSPECIFIED: Status: ACTIVE | Noted: 2020-11-10

## 2020-11-10 LAB
MICROALB/CREAT RATIO POC: < 30 MG/G
MICROALBUMIN/CREAT UR-RTO: 10 MG/L
POC CREATININE: 100 MG/DL

## 2020-11-10 PROCEDURE — 3017F COLORECTAL CA SCREEN DOC REV: CPT | Performed by: FAMILY MEDICINE

## 2020-11-10 PROCEDURE — 4040F PNEUMOC VAC/ADMIN/RCVD: CPT | Performed by: FAMILY MEDICINE

## 2020-11-10 PROCEDURE — 82044 UR ALBUMIN SEMIQUANTITATIVE: CPT | Performed by: FAMILY MEDICINE

## 2020-11-10 PROCEDURE — G8484 FLU IMMUNIZE NO ADMIN: HCPCS | Performed by: FAMILY MEDICINE

## 2020-11-10 PROCEDURE — G0439 PPPS, SUBSEQ VISIT: HCPCS | Performed by: FAMILY MEDICINE

## 2020-11-10 PROCEDURE — 3051F HG A1C>EQUAL 7.0%<8.0%: CPT | Performed by: FAMILY MEDICINE

## 2020-11-10 PROCEDURE — 1123F ACP DISCUSS/DSCN MKR DOCD: CPT | Performed by: FAMILY MEDICINE

## 2020-11-10 RX ORDER — MOMETASONE FUROATE 50 UG/1
2 SPRAY, METERED NASAL DAILY
Qty: 1 INHALER | Refills: 5 | Status: SHIPPED | OUTPATIENT
Start: 2020-11-10 | End: 2022-01-18 | Stop reason: SDUPTHER

## 2020-11-10 ASSESSMENT — LIFESTYLE VARIABLES
HAS A RELATIVE, FRIEND, DOCTOR, OR ANOTHER HEALTH PROFESSIONAL EXPRESSED CONCERN ABOUT YOUR DRINKING OR SUGGESTED YOU CUT DOWN: 0
HOW OFTEN DURING THE LAST YEAR HAVE YOU BEEN UNABLE TO REMEMBER WHAT HAPPENED THE NIGHT BEFORE BECAUSE YOU HAD BEEN DRINKING: 0
HOW MANY STANDARD DRINKS CONTAINING ALCOHOL DO YOU HAVE ON A TYPICAL DAY: 0
HOW OFTEN DURING THE LAST YEAR HAVE YOU HAD A FEELING OF GUILT OR REMORSE AFTER DRINKING: 0
AUDIT TOTAL SCORE: 1
AUDIT-C TOTAL SCORE: 1
HOW OFTEN DURING THE LAST YEAR HAVE YOU FOUND THAT YOU WERE NOT ABLE TO STOP DRINKING ONCE YOU HAD STARTED: 0
HOW OFTEN DURING THE LAST YEAR HAVE YOU NEEDED AN ALCOHOLIC DRINK FIRST THING IN THE MORNING TO GET YOURSELF GOING AFTER A NIGHT OF HEAVY DRINKING: 0
HAVE YOU OR SOMEONE ELSE BEEN INJURED AS A RESULT OF YOUR DRINKING: 0
HOW OFTEN DO YOU HAVE A DRINK CONTAINING ALCOHOL: 1
HOW OFTEN DO YOU HAVE SIX OR MORE DRINKS ON ONE OCCASION: 0
HOW OFTEN DURING THE LAST YEAR HAVE YOU FAILED TO DO WHAT WAS NORMALLY EXPECTED FROM YOU BECAUSE OF DRINKING: 0

## 2020-11-10 ASSESSMENT — PATIENT HEALTH QUESTIONNAIRE - PHQ9
SUM OF ALL RESPONSES TO PHQ QUESTIONS 1-9: 0
SUM OF ALL RESPONSES TO PHQ9 QUESTIONS 1 & 2: 0
1. LITTLE INTEREST OR PLEASURE IN DOING THINGS: 0
SUM OF ALL RESPONSES TO PHQ QUESTIONS 1-9: 0
2. FEELING DOWN, DEPRESSED OR HOPELESS: 0
SUM OF ALL RESPONSES TO PHQ QUESTIONS 1-9: 0

## 2020-11-10 NOTE — PROGRESS NOTES
Medicare Annual Wellness Visit  Name: Jason Kennedy Date: 11/10/2020   MRN: [id number] Sex: Male   Age: 72 y.o. Ethnicity: Non-/Non    : 1950 Race: Luci Carrasco is here for Medicare AWV; Follow-up (DMII); Toe Pain (left great toe pain. ); and Nasal Congestion    Screenings for behavioral, psychosocial and functional/safety risks, and cognitive dysfunction are all negative except as indicated below. These results, as well as other patient data from the [address] form, are documented in Flowsheets linked to this Encounter. Ever since was sick with few mos ago. Repeated negative COVID tests. Finally was feeling less fatigued few weeks ago. Had appt with Dr. Yash Gonzales this morning  Increased his Vit D level  Calcium is stable. Was on steroids few mos ago  Has a f/u with him in 6    Allergies   Allergen Reactions    Crestor [Rosuvastatin]      Muscle aches    Lipitor [Atorvastatin]      Back ache    Amlodipine Swelling     Leg swelling         Prior to Visit Medications    Medication Sig Taking?  Authorizing Provider   mometasone (NASONEX) 50 MCG/ACT nasal spray 2 sprays by Each Nostril route daily Yes Drucie Sicard, MD   magnesium oxide (MAG-OX) 400 (241.3 Mg) MG TABS tablet TAKE ONE TABLET BY MOUTH  DAILY Yes Drucie Sicard, MD   pravastatin (PRAVACHOL) 20 MG tablet TAKE 1 TABLET BY MOUTH IN THE EVENING FOR  CHOLESTEROL Yes Drucie Sicard, MD   sildenafil (REVATIO) 20 MG tablet TAKE TWO TABLETS BY MOUTH AS NEEDED ONE  HOUR  PRIOR  TO  INTERCOURSE Yes Drucie Sicard, MD   metFORMIN (GLUCOPHAGE) 500 MG tablet TAKE 1 TABLET BY MOUTH ONCE DAILY IN THE EVENING ALONG WITH  1000  MG TABLET FOR A TOTAL OF 1500 MG Yes Drucie Sicard, MD   metFORMIN (GLUCOPHAGE) 1000 MG tablet TAKE 1 TABLET BY MOUTH TWICE DAILY WITH MEALS Yes Drucie Sicard, MD   albuterol sulfate HFA (VENTOLIN HFA) 108 (90 Base) MCG/ACT inhaler Inhale 2 puffs into the lungs every 4 hours as needed for Wheezing or Shortness of Breath Yes Paulina Verde MD   Spacer/Aero-Holding Chambers (E-Z SPACER) SARA Use with albuterol inhaler Yes Paulina Verde MD   hydroCHLOROthiazide (MICROZIDE) 12.5 MG capsule Take 1 capsule by mouth once daily Yes Paulina Verde MD   doxazosin (CARDURA) 4 MG tablet Take 1 tablet by mouth nightly Yes Paulina Verde MD   carvedilol (COREG) 25 MG tablet Take 1 tablet by mouth twice daily Yes Paulina Verde MD   carvedilol (COREG) 25 MG tablet TAKE ONE TABLET BY MOUTH TWICE DAILY Yes Paulina Verde MD   candesartan (ATACAND) 32 MG tablet Take 1 tablet by mouth once daily for blood pressure Yes Paulina Verde MD   Dextromethorphan-guaiFENesin (TUSSIN DM PO) Take by mouth Indications: Patient is taking 1 tsp prn Yes Historical Provider, MD   ibuprofen (ADVIL;MOTRIN) 200 MG tablet Take 400 mg by mouth every 6 hours as needed for Pain Yes Historical Provider, MD   albuterol sulfate HFA (VENTOLIN HFA) 108 (90 Base) MCG/ACT inhaler Inhale 2 puffs into the lungs every 4 hours as needed for Wheezing or Shortness of Breath (or coughing) Yes Paulina Verde MD   guaiFENesin (MUCINEX) 600 MG extended release tablet Take 1 tablet by mouth 2 times daily Take 1-2 times twice per day as needed for chest or nasal congestion Yes Paulina Verde MD   diclofenac sodium 1 % GEL Apply 2 g topically 2 times daily To painful joints of hands Yes Paulina Verde MD   Dulaglutide (TRULICITY) 1.5 ZB/9.0UL SOPN Inject into the skin Yes Historical Provider, MD   Vitamin D, Cholecalciferol, 400 units CAPS Take 1 capsule by mouth daily Yes Paulina Verde MD   Multiple Vitamins-Minerals (THERAPEUTIC MULTIVITAMIN-MINERALS) tablet Take 1 tablet by mouth daily Yes Historical Provider, MD   ONE TOUCH ULTRA TEST STRP  Yes Historical Provider, MD   Cinnamon 500 MG TABS Take 1,000 mg by mouth daily  Yes Historical Provider, MD   aspirin 81 MG tablet Take 81 mg by mouth daily Yes Historical Provider, MD   glimepiride (AMARYL) 4 MG tablet Take 1 tablet by mouth 2 times daily  Yesenia Ruby MD         Past Medical History:   Diagnosis Date    Erectile dysfunction     Hyperlipidemia     Hypertension     LEW treated with BiPAP     Type 2 diabetes mellitus ([facility] [address] 2000       Past Surgical History:   Procedure Laterality Date    APPENDECTOMY  as teenager    BACK SURGERY  1988    herniated disc- L5-S1    CATARACT REMOVAL Bilateral     COLONOSCOPY  07/2016    ENDOSCOPIC ULTRASOUND (UPPER)  02/22/2017    EYE SURGERY Right 2005    SKIN CANCER EXCISION Left     left hand     TONSILLECTOMY      as teenager    UPPER GASTROINTESTINAL ENDOSCOPY           Family History   Problem Relation Age of Onset    Diabetes Mother     Cancer Mother         lung    Diabetes Father     Kidney Disease Father     Heart Attack Paternal Grandfather 72       CareTeam (Including outside providers/suppliers regularly involved in providing care):   Patient Care Team:  Yesenia Ruby MD as PCP - Kathryn Gr MD as PCP - [facility] Empaneled Provider  Rupesh Benson MD as Consulting Physician (Sleep Medicine)    Wt Readings from Last 3 Encounters:   11/10/20 193 lb 12.8 oz (87.9 kg)   07/06/20 193 lb (87.5 kg)   01/28/20 191 lb 6.4 oz (86.8 kg)     Vitals:    11/10/20 1325   BP: 118/70   Site: Left Upper Arm   Position: Sitting   Cuff Size: Medium Adult   Pulse: 72   Resp: 18   Temp: 97.1 °F (36.2 °C)   TempSrc: Temporal   Weight: 193 lb 12.8 oz (87.9 kg)   Height: 5' 9\" (1.753 m)     Body mass index is 28.62 kg/m². Based upon direct observation of the patient, evaluation of cognition reveals recent and remote memory intact.     General Appearance: alert and oriented to person, place and time, well developed and well- nourished, in no acute distress  Skin: warm and dry, no rash or erythema  Head: normocephalic and atraumatic  Eyes: pupils equal, round, and reactive to light, extraocular eye movements intact, conjunctivae normal  ENT: tympanic membrane, external ear and ear canal normal bilaterally, nose without deformity, nasal mucosa and turbinates normal without polyps  Neck: supple and non-tender without mass, no thyromegaly or thyroid nodules, no cervical lymphadenopathy  Pulmonary/Chest: clear to auscultation bilaterally- no wheezes, rales or rhonchi, normal air movement, no respiratory distress  Cardiovascular: normal rate, regular rhythm, normal S1 and S2, no murmurs, rubs, clicks, or gallops, distal pulses intact, no carotid bruits  Abdomen: soft, non-tender, non-distended, normal bowel sounds, no masses or organomegaly  Extremities: no cyanosis, clubbing or edema  Musculoskeletal: normal range of motion, no joint swelling, deformity or tenderness      Visual inspection:  Deformity/amputation: absent  Skin lesions/pre-ulcerative calluses: absent  Edema: right- negative, left- negative    Sensory exam:  Monofilament sensation: normal in bilateral feet  (minimum of 5 random plantar locations tested, avoiding callused areas - > 1 area with absence of sensation is + for neuropathy)    Plus at least one of the following:  Pulses: normal bilateral feet  Pinprick: N/A  Proprioception: N/A  Vibration (128 Hz): N/A      Patient's complete Health Risk Assessment and screening values have been reviewed and are found in Flowsheets. The following problems were reviewed today and where indicated follow up appointments were made and/or referrals ordered. Positive Risk Factor Screenings with Interventions:       General Health and ACP:  General  In general, how would you say your health is?: Good  In the past 7 days, have you experienced any of the following?  New or Increased Pain, New or Increased Fatigue, Loneliness, Social Isolation, Stress or Anger?: None of These  Do you get the social and emotional support that you need?: Yes  Do you have a Living Will?: (!) No(patient unsure)  Advance Directives     Power of  Living Will ACP-Advance Directive ACP-Power of     Not on File Not on File [address] Interventions:  · No Living Will: states would want cpr but not prolonged \"being on a machine\"    Health Habits/Nutrition:  Health Habits/Nutrition  Do you exercise for at least 20 minutes 2-3 times per week?: (!) No  Have you lost any weight without trying in the past 3 months?: No  Do you eat fewer than 2 meals per day?: No  Have you seen a dentist within the past year?: Yes  Body mass index: (!) 28.62  Health Habits/Nutrition Interventions:  · Inadequate physical activity:  is physically active most days in house working     Safety:  Safety  Do you have working smoke detectors?: Yes  Have all throw rugs been removed or fastened?: Yes  Do you have non-slip mats or surfaces in all bathtubs/showers?: Yes  Do all of your stairways have a railing or banister?: (!) No(no stairs in house)  Are your doorways, halls and stairs free of clutter?: Yes  Do you always fasten your seatbelt when you are in a car?: Yes  Safety Interventions:  · Home safety tips provided    Personalized Preventive Plan   Current Health Maintenance Status  Immunization History   Administered Date(s) Administered    Influenza, High Dose (Fluzone 65 yrs and older) 11/05/2017, 09/16/2018    Influenza, High-dose, Quadv, 65 yrs +, IM (Fluzone) 09/21/2020    Influenza, Quadv, IM, (6 mo and older Fluzone, Flulaval, Fluarix and 3 yrs and older Afluria) 09/22/2016    Influenza, Triv, inactivated, subunit, adjuvanted, IM (Fluad 65 yrs and older) 09/03/2019    Pneumococcal Conjugate 13-valent ([id number]) 06/21/2016, 09/16/2018    Pneumococcal Conjugate 7-valent (Prevnar7) 10/06/2009    Pneumococcal Polysaccharide ([id number]) 09/30/2019    Tdap (Boostrix, Adacel) 08/26/2014        Health Maintenance   Topic Date Due    Shingles Vaccine (1 of 2) 03/16/2001    Annual Wellness Visit (AWV)  05/29/2019    Diabetic foot exam  09/03/2020    Diabetic microalbuminuria test  09/30/2020    Lipid screen  09/30/2020    Diabetic retinal exam  02/17/2021    Colon cancer screen colonoscopy  07/26/2021    A1C test (Diabetic or Prediabetic)  11/03/2021    Potassium monitoring  11/03/2021    Creatinine monitoring  11/03/2021    DTaP/Tdap/Td vaccine (2 - Td) 08/26/2024    Flu vaccine  Completed    Pneumococcal 65+ years Vaccine  Completed    AAA screen  Completed    Hepatitis C screen  Completed    Hepatitis A vaccine  Aged Out    Hib vaccine  Aged Out    Meningococcal (ACWY) vaccine  Aged Out     Recommendations for [employer] Due: see orders and patient instructions/AVS.  . Recommended screening schedule for the next 5-10 years is provided to the patient in written form: see Patient Instructions/AVS.    Elder Crawford was seen today for medicare awv, follow-up, toe pain and nasal congestion. Diagnoses and all orders for this visit:    Routine general medical examination at a health care facility    Type 2 diabetes mellitus with hyperglycemia, with long-term current use of insulin ([address]  -      DIABETES FOOT EXAM  -     POCT microalbumin    Pure hypercholesterolemia  -     Lipid, Fasting;  Future    Other orders  -     mometasone (NASONEX) 50 MCG/ACT nasal spray; 2 sprays by Each Nostril route daily

## 2020-11-10 NOTE — PATIENT INSTRUCTIONS
Advance Directives: Care Instructions  Overview  An advance directive is a legal way to state your wishes at the end of your life. It tells your family and your doctor what to do if you can't say what you want. There are two main types of advance directives. You can change them any time your wishes change. Living will. This form tells your family and your doctor your wishes about life support and other treatment. The form is also called a declaration. Medical power of . This form lets you name a person to make treatment decisions for you when you can't speak for yourself. This person is called a health care agent (health care proxy, health care surrogate). The form is also called a durable power of  for health care. If you do not have an advance directive, decisions about your medical care may be made by a family member, or by a doctor or a  who doesn't know you. It may help to think of an advance directive as a gift to the people who care for you. If you have one, they won't have to make tough decisions by themselves. Follow-up care is a key part of your treatment and safety. Be sure to make and go to all appointments, and call your doctor if you are having problems. It's also a good idea to know your test results and keep a list of the medicines you take. What should you include in an advance directive? Many states have a unique advance directive form. (It may ask you to address specific issues.) Or you might use a universal form that's approved by many states. If your form doesn't tell you what to address, it may be hard to know what to include in your advance directive. Use the questions below to help you get started. · Who do you want to make decisions about your medical care if you are not able to? · What life-support measures do you want if you have a serious illness that gets worse over time or can't be cured? · What are you most afraid of that might happen? (Maybe you're afraid of having pain, losing your independence, or being kept alive by machines.)  · Where would you prefer to die? (Your home? A hospital? A nursing home?)  · Do you want to donate your organs when you die? · Do you want certain Advent practices performed before you die? When should you call for help? Be sure to contact your doctor if you have any questions. Where can you learn more? Go to [email or web address] and sign in to your [employer] account. Enter R264 in the [employer] box to learn more about \"Advance Directives: Care Instructions. \"     If you do not have an account, please click on the \"Sign Up Now\" link. Current as of: December 9, 2019               Content Version: 12.6  © 0962-3511 [employer]. Care instructions adapted under license by [facility] ([facility]). If you have questions about a medical condition or this instruction, always ask your healthcare professional. [place] [address] any warranty or liability for your use of this information. Learning About Medical Power of   What is a medical power of ? A medical power of , also called a durable power of  for health care, is one type of the legal forms called advance directives. It lets you name the person you want to make treatment decisions for you if you can't speak or decide for yourself. The person you choose is called your health care agent. This person is also called a health care proxy or health care surrogate. A medical power of  may be called something else in your state. How do you choose a health care agent? Choose your health care agent carefully. This person may or may not be a family member. Talk to the person before you make your final decision. Make sure he or she is comfortable with this responsibility. It's a good idea to choose someone who:  · Is at least 25years old.   · Knows you well and you learn more? Go to [email or web address] and sign in to your [employer] account. Enter [id number] in the [facility] box to learn more about \"Learning About [address]. \"     If you do not have an account, please click on the \"Sign Up Now\" link. Current as of: December 9, 2019               Content Version: 12.6  © 8197-7726 [employer]. Care instructions adapted under license by [facility] ([facility]). If you have questions about a medical condition or this instruction, always ask your healthcare professional. [address] any warranty or liability for your use of this information. Learning About Geoff Davidson  What is a living will? A living will, also called a declaration, is a legal form. It tells your family and your doctor your wishes when you can't speak for yourself. It's used by the health professionals who will treat you as you near the end of your life or if you get seriously hurt or ill. If you put your wishes in writing, your loved ones and others will know what kind of care you want. They won't need to guess. This can ease your mind and be helpful to others. And you can change or cancel your living will at any time. A living will is not the same as an estate or property will. An estate will explains what you want to happen with your money and property after you die. How do you use it? A living will is used to describe the kinds of treatment or life support you want as you near the end of your life or if you get seriously hurt or ill. Keep these facts in mind about living cha. · Your living will is used only if you can't speak or make decisions for yourself. Most often, one or more doctors must certify that you can't speak or decide for yourself before your living will takes effect. · If you get better and can speak for yourself again, you can accept or refuse any treatment.  It doesn't matter what you said in your living will. · Some states may limit your right to refuse treatment in certain cases. For example, you may need to clearly state in your living will that you don't want artificial hydration and nutrition, such as being fed through a tube. Is a living will a legal document? A living will is a legal document. Each state has its own laws about living cha. And a living will may be called something else in your state. Here are some things to know about living cha. · You don't need an  to complete a living will. But legal advice can be helpful if your state's laws are unclear. It can also help if your health history is complicated or your family can't agree on what should be in your living will. · You can change your living will at any time. Some people find that their wishes about end-of-life care change as their health changes. If you make big changes to your living will, complete a new form. · If you move to another state, make sure that your living will is legal in the state where you now live. In most cases, doctors will respect your wishes even if you have a form from a different state. · You might use a universal form that has been approved by many states. This kind of form can sometimes be filled out and stored online. Your digital copy will then be available wherever you have a connection to the internet. The doctors and nurses who need to treat you can find it right away. · Your state may offer an online registry. This is another place where you can store your living will online. · It's a good idea to get your living will notarized. This means using a person called a  to watch two people sign, or witness, your living will. What should you know when you create a living will? Here are some questions to ask yourself as you make your living will:  · Do you know enough about life support methods that might be used?  If not, talk to your doctor so you know what might be done if you can't breathe on your own, your heart stops, or you can't swallow. · What things would you still want to be able to do after you receive life-support methods? Would you want to be able to walk? To speak? To eat on your own? To live without the help of machines? · Do you want certain Protestant practices performed if you become very ill? · If you have a choice, where do you want to be cared for? In your home? At a hospital or nursing home? · If you have a choice at the end of your life, where would you prefer to die? At home? In a hospital or nursing home? Somewhere else? · Would you prefer to be buried or cremated? · Do you want your organs to be donated after you die? What should you do with your living will? · Make sure that your family members and your health care agent have copies of your living will (also called a declaration). · Give your doctor a copy of your living will. Ask him or her to keep it as part of your medical record. If you have more than one doctor, make sure that each one has a copy. · Put a copy of your living will where it can be easily found. For example, some people may put a copy on their refrigerator door. If you are using a digital copy, be sure your doctor, family members, and health care agent know how to find and access it. Where can you learn more? Go to [email or web address] and sign in to your [employer] account. Enter J560 in the [facility] box to learn more about \"Learning About Living [place]. \"     If you do not have an account, please click on the \"Sign Up Now\" link. Current as of: December 9, 2019               Content Version: 12.6  © 9196-6925 [employer]. Care instructions adapted under license by [facility] ([facility]  If you have questions about a medical condition or this instruction, always ask your healthcare professional. [address] any warranty or liability for your use of this information. Personalized Preventive Plan for Lakshmi Gracia - 11/10/2020  Medicare offers a range of preventive health benefits. Some of the tests and screenings are paid in full while other may be subject to a deductible, co-insurance, and/or copay. Some of these benefits include a comprehensive review of your medical history including lifestyle, illnesses that may run in your family, and various assessments and screenings as appropriate. After reviewing your medical record and screening and assessments performed today your provider may have ordered immunizations, labs, imaging, and/or referrals for you. A list of these orders (if applicable) as well as your Preventive Care list are included within your After Visit Summary for your review. Other Preventive Recommendations:    · A preventive eye exam performed by an eye specialist is recommended every 1-2 years to screen for glaucoma; cataracts, macular degeneration, and other eye disorders. · A preventive dental visit is recommended every 6 months. · Try to get at least 150 minutes of exercise per week or 10,000 steps per day on a pedometer . · Order or download the FREE \"Exercise & Physical Activity: Your Everyday Guide\" from [employer]. Call 5-624.759.3507 or search [employer] online. · You need 2505-9385 mg of calcium and 4581-7974 IU of vitamin D per day. It is possible to meet your calcium requirement with diet alone, but a vitamin D supplement is usually necessary to meet this goal.  · When exposed to the sun, use a sunscreen that protects against both UVA and UVB radiation with an SPF of 30 or greater. Reapply every 2 to 3 hours or after sweating, drying off with a towel, or swimming. · Always wear a seat belt when traveling in a car. Always wear a helmet when riding a bicycle or motorcycle.

## 2020-11-23 ENCOUNTER — NURSE ONLY (OUTPATIENT)
Dept: LAB | Age: 70
End: 2020-11-23

## 2020-11-23 LAB
CHOLESTEROL, FASTING: 122 MG/DL (ref 100–199)
HDLC SERPL-MCNC: 36 MG/DL
LDL CHOLESTEROL CALCULATED: 67 MG/DL
TRIGLYCERIDE, FASTING: 94 MG/DL (ref 0–199)

## 2021-01-04 RX ORDER — GLIMEPIRIDE 4 MG/1
TABLET ORAL
Qty: 135 TABLET | Refills: 1 | Status: SHIPPED | OUTPATIENT
Start: 2021-01-04 | End: 2021-06-30

## 2021-01-25 ENCOUNTER — OFFICE VISIT (OUTPATIENT)
Dept: PULMONOLOGY | Age: 71
End: 2021-01-25
Payer: MEDICARE

## 2021-01-25 VITALS
BODY MASS INDEX: 29.47 KG/M2 | HEART RATE: 66 BPM | SYSTOLIC BLOOD PRESSURE: 122 MMHG | HEIGHT: 69 IN | WEIGHT: 199 LBS | OXYGEN SATURATION: 96 % | TEMPERATURE: 97.2 F | DIASTOLIC BLOOD PRESSURE: 80 MMHG

## 2021-01-25 DIAGNOSIS — G47.33 OSA TREATED WITH BIPAP: Primary | ICD-10-CM

## 2021-01-25 PROCEDURE — 4040F PNEUMOC VAC/ADMIN/RCVD: CPT | Performed by: NURSE PRACTITIONER

## 2021-01-25 PROCEDURE — 99214 OFFICE O/P EST MOD 30 MIN: CPT | Performed by: NURSE PRACTITIONER

## 2021-01-25 PROCEDURE — 1036F TOBACCO NON-USER: CPT | Performed by: NURSE PRACTITIONER

## 2021-01-25 PROCEDURE — G8427 DOCREV CUR MEDS BY ELIG CLIN: HCPCS | Performed by: NURSE PRACTITIONER

## 2021-01-25 PROCEDURE — G8417 CALC BMI ABV UP PARAM F/U: HCPCS | Performed by: NURSE PRACTITIONER

## 2021-01-25 PROCEDURE — 1123F ACP DISCUSS/DSCN MKR DOCD: CPT | Performed by: NURSE PRACTITIONER

## 2021-01-25 PROCEDURE — G8484 FLU IMMUNIZE NO ADMIN: HCPCS | Performed by: NURSE PRACTITIONER

## 2021-01-25 PROCEDURE — 3017F COLORECTAL CA SCREEN DOC REV: CPT | Performed by: NURSE PRACTITIONER

## 2021-01-25 ASSESSMENT — ENCOUNTER SYMPTOMS
EYES NEGATIVE: 1
ALLERGIC/IMMUNOLOGIC NEGATIVE: 1
RESPIRATORY NEGATIVE: 1
CHEST TIGHTNESS: 0
NAUSEA: 0
STRIDOR: 0
DIARRHEA: 0
GASTROINTESTINAL NEGATIVE: 1
WHEEZING: 0
VOMITING: 0

## 2021-01-25 NOTE — PROGRESS NOTES
 candesartan (ATACAND) 32 MG tablet Take 1 tablet by mouth once daily for blood pressure 30 tablet 11    Dextromethorphan-guaiFENesin (TUSSIN DM PO) Take by mouth Indications: Patient is taking 1 tsp prn      ibuprofen (ADVIL;MOTRIN) 200 MG tablet Take 400 mg by mouth every 6 hours as needed for Pain      guaiFENesin (MUCINEX) 600 MG extended release tablet Take 1 tablet by mouth 2 times daily Take 1-2 times twice per day as needed for chest or nasal congestion 30 tablet 0    diclofenac sodium 1 % GEL Apply 2 g topically 2 times daily To painful joints of hands 1 Tube 3    Dulaglutide (TRULICITY) 1.5 MC/3.9VO SOPN Inject into the skin      Vitamin D, Cholecalciferol, 400 units CAPS Take 1 capsule by mouth daily 30 capsule 11    Multiple Vitamins-Minerals (THERAPEUTIC MULTIVITAMIN-MINERALS) tablet Take 1 tablet by mouth daily      ONE TOUCH ULTRA TEST STRP       Cinnamon 500 MG TABS Take 1,000 mg by mouth daily       aspirin 81 MG tablet Take 81 mg by mouth daily      albuterol sulfate HFA (VENTOLIN HFA) 108 (90 Base) MCG/ACT inhaler Inhale 2 puffs into the lungs every 4 hours as needed for Wheezing or Shortness of Breath 1 Inhaler 0     No current facility-administered medications for this visit. Family History   Problem Relation Age of Onset    Diabetes Mother     Cancer Mother         lung    Diabetes Father     Kidney Disease Father     Heart Attack Paternal Grandfather 72        Review of Systems -   Review of Systems   Constitutional: Negative. Negative for chills, fever and unexpected weight change. HENT: Negative. Eyes: Negative. Respiratory: Negative. Negative for chest tightness, wheezing and stridor. Cardiovascular: Negative for chest pain and leg swelling. Gastrointestinal: Negative. Negative for diarrhea, nausea and vomiting. Endocrine: Negative. Genitourinary: Negative. Negative for dysuria. Musculoskeletal: Negative. Skin: Negative. Allergic/Immunologic: Negative. Neurological: Negative. Hematological: Negative. Psychiatric/Behavioral: Negative. Physical Exam:    BMI:  Body mass index is 29.39 kg/m². Wt Readings from Last 3 Encounters:   01/25/21 199 lb (90.3 kg)   11/10/20 193 lb 12.8 oz (87.9 kg)   07/06/20 193 lb (87.5 kg)     Weight gained 6 lbs over 2 months  Vitals: /80 (Site: Left Upper Arm, Position: Sitting, Cuff Size: Medium Adult)   Pulse 66   Temp 97.2 °F (36.2 °C)   Ht 5' 9\" (1.753 m)   Wt 199 lb (90.3 kg)   SpO2 96% Comment: on room air  BMI 29.39 kg/m²       Physical Exam  Vitals signs and nursing note reviewed. Constitutional:       General: He is not in acute distress. Appearance: He is well-developed. HENT:      Head: Normocephalic and atraumatic. Neck:      Trachea: No tracheal deviation. Cardiovascular:      Rate and Rhythm: Normal rate and regular rhythm. Heart sounds: Normal heart sounds. No murmur. Pulmonary:      Effort: Pulmonary effort is normal. No respiratory distress. Breath sounds: Normal breath sounds. No stridor. No wheezing or rales. Chest:      Chest wall: No tenderness. Abdominal:      General: Bowel sounds are normal. There is no distension. Palpations: Abdomen is soft. Skin:     General: Skin is warm and dry. Capillary Refill: Capillary refill takes less than 2 seconds. Neurological:      Mental Status: He is alert and oriented to person, place, and time. Psychiatric:         Behavior: Behavior normal.         Thought Content: Thought content normal.         Judgment: Judgment normal.       ASSESSMENT/DIAGNOSIS     Diagnosis Orders   1. LEW treated with BiPAP  DME Order for CPAP as OP        Plan   Do you need any equipment today? Yes   - He  was advised to continue current positive airway pressure therapy with above described pressure. - He  advised to keep good compliance with current recommended pressure to get optimal results and clinical improvement  - Recommend 7-9 hours of sleep with PAP  - He was advised to call DME company regarding supplies if needed.   -He call my office for earlier appointment if needed for worsening of sleep symptoms.   - He was instructed on weight loss  - Jean-Paul Side was educated about my impression and plan. Patient verbalizesunderstanding.   We will see Paul Rodríguez back in: 1 year with download     Information added by my medical assistant/LPN was reviewed today     Electronically signed by OMID Rodríguez CNP on 1/25/2021 at 9:17 AM

## 2021-01-27 DIAGNOSIS — I10 ESSENTIAL HYPERTENSION: ICD-10-CM

## 2021-01-27 RX ORDER — CANDESARTAN 32 MG/1
TABLET ORAL
Qty: 30 TABLET | Refills: 11 | Status: SHIPPED | OUTPATIENT
Start: 2021-01-27 | End: 2021-01-27

## 2021-01-27 RX ORDER — CANDESARTAN 32 MG/1
TABLET ORAL
Qty: 90 TABLET | Refills: 3 | Status: SHIPPED | OUTPATIENT
Start: 2021-01-27 | End: 2022-01-18 | Stop reason: SDUPTHER

## 2021-01-27 NOTE — TELEPHONE ENCOUNTER
Last visit- 11/10/2020  Next visit- 5/10/2021    Requested Prescriptions     Pending Prescriptions Disp Refills    candesartan (ATACAND) 32 MG tablet 30 tablet 11     Sig: Take 1 tablet by mouth once daily for blood pressure

## 2021-02-12 ENCOUNTER — TELEPHONE (OUTPATIENT)
Dept: FAMILY MEDICINE CLINIC | Age: 71
End: 2021-02-12

## 2021-02-12 NOTE — TELEPHONE ENCOUNTER
[facility] received a call from:    Name of Caller: Justine Mcmanus    Relationship to patient: Wife    [facility] name[facility]    Best contact number: [phone]    Reason for call: Pt's wife calling to schedule an appointment for her . Pt would like to come into the office but is experiencing a headache and heaviness in his chest. Please advise.

## 2021-02-12 NOTE — TELEPHONE ENCOUNTER
Appointment made with bere pt spouse is aware that if symptoms persist, to take him to walk in clinic over the weekend. Pt spouse agrees with plan.

## 2021-02-15 ENCOUNTER — HOSPITAL ENCOUNTER (OUTPATIENT)
Age: 71
Discharge: HOME | End: 2021-02-15
Payer: MEDICARE

## 2021-02-15 ENCOUNTER — NURSE ONLY (OUTPATIENT)
Dept: LAB | Age: 71
End: 2021-02-15

## 2021-02-15 ENCOUNTER — OFFICE VISIT (OUTPATIENT)
Dept: FAMILY MEDICINE CLINIC | Age: 71
End: 2021-02-15
Payer: MEDICARE

## 2021-02-15 ENCOUNTER — HOSPITAL ENCOUNTER (OUTPATIENT)
Dept: GENERAL RADIOLOGY | Age: 71
Discharge: HOME | End: 2021-02-15
Payer: MEDICARE

## 2021-02-15 VITALS
RESPIRATION RATE: 18 BRPM | DIASTOLIC BLOOD PRESSURE: 62 MMHG | HEIGHT: 69 IN | HEART RATE: 74 BPM | SYSTOLIC BLOOD PRESSURE: 130 MMHG | BODY MASS INDEX: 28.64 KG/M2 | TEMPERATURE: 97.1 F | OXYGEN SATURATION: 98 % | WEIGHT: 193.4 LBS

## 2021-02-15 DIAGNOSIS — E11.65 TYPE 2 DIABETES MELLITUS WITH HYPERGLYCEMIA, WITH LONG-TERM CURRENT USE OF INSULIN (HCC): ICD-10-CM

## 2021-02-15 DIAGNOSIS — R07.9 CHEST PAIN, UNSPECIFIED TYPE: Primary | ICD-10-CM

## 2021-02-15 DIAGNOSIS — R94.31 ABNORMAL EKG: ICD-10-CM

## 2021-02-15 DIAGNOSIS — R05.9 COUGH: ICD-10-CM

## 2021-02-15 DIAGNOSIS — R07.9 CHEST PAIN, UNSPECIFIED TYPE: ICD-10-CM

## 2021-02-15 DIAGNOSIS — Z79.4 TYPE 2 DIABETES MELLITUS WITH HYPERGLYCEMIA, WITH LONG-TERM CURRENT USE OF INSULIN (HCC): ICD-10-CM

## 2021-02-15 DIAGNOSIS — E21.3 HYPERPARATHYROIDISM (HCC): ICD-10-CM

## 2021-02-15 DIAGNOSIS — R51.9 NONINTRACTABLE HEADACHE, UNSPECIFIED CHRONICITY PATTERN, UNSPECIFIED HEADACHE TYPE: ICD-10-CM

## 2021-02-15 LAB
BASOPHILS # BLD: 0.8 %
BASOPHILS ABSOLUTE: 0.1 THOU/MM3 (ref 0–0.1)
EOSINOPHIL # BLD: 1.6 %
EOSINOPHILS ABSOLUTE: 0.1 THOU/MM3 (ref 0–0.4)
ERYTHROCYTE [DISTWIDTH] IN BLOOD BY AUTOMATED COUNT: 13.2 % (ref 11.5–14.5)
ERYTHROCYTE [DISTWIDTH] IN BLOOD BY AUTOMATED COUNT: 43.5 FL (ref 35–45)
HCT VFR BLD CALC: 40.9 % (ref 42–52)
HEMOGLOBIN: 12.7 GM/DL (ref 14–18)
IMMATURE GRANS (ABS): 0.03 THOU/MM3 (ref 0–0.07)
IMMATURE GRANULOCYTES: 0.4 %
LYMPHOCYTES # BLD: 24.1 %
LYMPHOCYTES ABSOLUTE: 1.8 THOU/MM3 (ref 1–4.8)
MCH RBC QN AUTO: 28.3 PG (ref 26–33)
MCHC RBC AUTO-ENTMCNC: 31.1 GM/DL (ref 32.2–35.5)
MCV RBC AUTO: 91.3 FL (ref 80–94)
MONOCYTES # BLD: 7.8 %
MONOCYTES ABSOLUTE: 0.6 THOU/MM3 (ref 0.4–1.3)
NUCLEATED RED BLOOD CELLS: 0 /100 WBC
PLATELET # BLD: 271 THOU/MM3 (ref 130–400)
PMV BLD AUTO: 11.1 FL (ref 9.4–12.4)
RBC # BLD: 4.48 MILL/MM3 (ref 4.7–6.1)
SEG NEUTROPHILS: 65.3 %
SEGMENTED NEUTROPHILS ABSOLUTE COUNT: 4.9 THOU/MM3 (ref 1.8–7.7)
WBC # BLD: 7.5 THOU/MM3 (ref 4.8–10.8)

## 2021-02-15 PROCEDURE — 93000 ELECTROCARDIOGRAM COMPLETE: CPT | Performed by: NURSE PRACTITIONER

## 2021-02-15 PROCEDURE — 2022F DILAT RTA XM EVC RTNOPTHY: CPT | Performed by: NURSE PRACTITIONER

## 2021-02-15 PROCEDURE — 99214 OFFICE O/P EST MOD 30 MIN: CPT | Performed by: NURSE PRACTITIONER

## 2021-02-15 PROCEDURE — G8484 FLU IMMUNIZE NO ADMIN: HCPCS | Performed by: NURSE PRACTITIONER

## 2021-02-15 PROCEDURE — G8417 CALC BMI ABV UP PARAM F/U: HCPCS | Performed by: NURSE PRACTITIONER

## 2021-02-15 PROCEDURE — 3017F COLORECTAL CA SCREEN DOC REV: CPT | Performed by: NURSE PRACTITIONER

## 2021-02-15 PROCEDURE — 1036F TOBACCO NON-USER: CPT | Performed by: NURSE PRACTITIONER

## 2021-02-15 PROCEDURE — 71046 X-RAY EXAM CHEST 2 VIEWS: CPT

## 2021-02-15 PROCEDURE — G8427 DOCREV CUR MEDS BY ELIG CLIN: HCPCS | Performed by: NURSE PRACTITIONER

## 2021-02-15 PROCEDURE — 3046F HEMOGLOBIN A1C LEVEL >9.0%: CPT | Performed by: NURSE PRACTITIONER

## 2021-02-15 PROCEDURE — 1123F ACP DISCUSS/DSCN MKR DOCD: CPT | Performed by: NURSE PRACTITIONER

## 2021-02-15 PROCEDURE — 4040F PNEUMOC VAC/ADMIN/RCVD: CPT | Performed by: NURSE PRACTITIONER

## 2021-02-15 ASSESSMENT — ENCOUNTER SYMPTOMS
SHORTNESS OF BREATH: 1
EYES NEGATIVE: 1
GASTROINTESTINAL NEGATIVE: 1
COUGH: 1
CHEST TIGHTNESS: 1

## 2021-02-15 NOTE — PROGRESS NOTES
Joss Faircihld is a 72 y.o. male whopresents today for :  Chief Complaint   Patient presents with    Migraine     dull ache as the day goes on for the past few weeks    Shortness of Breath     x1 week    Other     Chest heaviness    Cough    Sinusitis       HPI:     HPI  Pt reports that first began with dull headache about 2 weeks ago. Pt felt was sinus related. Then last week began with chest heaviness. The heaviness does come and go. Has felt more tired. Did stop drinking coffee as he thought was indigestion. That did seem to help a little. But just feels a weight on his chest and he becomes sob with activity.         Patient Active Problem List   Diagnosis    Hyperlipidemia    Type 2 diabetes mellitus with hyperglycemia, with long-term current use of insulin ([address]    Essential hypertension    Hypercalcemia    Vitamin D insufficiency    GERD (gastroesophageal reflux disease)    Has stopped breathing    Heart murmur    LEW treated with BiPAP    Hyperparathyroidism ([address]    Hypomagnesemia    Normocytic anemia    Iron deficiency anemia    Male erectile dysfunction, unspecified        Past Medical History:   Diagnosis Date    Erectile dysfunction     Hyperlipidemia     Hypertension     LEW treated with BiPAP     Type 2 diabetes mellitus ([address] 2000      Past Surgical History:   Procedure Laterality Date    APPENDECTOMY  as teenager    BACK SURGERY  1988    herniated disc- L5-S1    CATARACT REMOVAL Bilateral     COLONOSCOPY  07/2016    ENDOSCOPIC ULTRASOUND (UPPER)  02/22/2017    EYE SURGERY Right 2005    SKIN CANCER EXCISION Left     left hand     TONSILLECTOMY      as teenager    UPPER GASTROINTESTINAL ENDOSCOPY       Family History   Problem Relation Age of Onset    Diabetes Mother     Cancer Mother         lung    Diabetes Father     Kidney Disease Father     Heart Attack Paternal Grandfather 72     Social History     Tobacco Use    Smoking status: Former Smoker Packs/day: 1.25     Years: 20.00     Pack years: 25.00     Quit date: 1995     Years since quittin.1    Smokeless tobacco: Former User     Types: Chew     Quit date: 1990   Substance Use Topics    Alcohol use: No     Alcohol/week: 0.0 standard drinks      Current Outpatient Medications   Medication Sig Dispense Refill    candesartan (ATACAND) 32 MG tablet Take 1 tablet by mouth once daily for blood pressure 90 tablet 3    glimepiride (AMARYL) 4 MG tablet TAKE 1 TABLET BY MOUTH IN THE MORNING AND 1/2 TABLET IN THE EVENING WITH SUPPER 135 tablet 1    pravastatin (PRAVACHOL) 20 MG tablet TAKE 1 TABLET BY MOUTH IN THE EVENING FOR CHOLESTEROL 90 tablet 3    mometasone (NASONEX) 50 MCG/ACT nasal spray 2 sprays by Each Nostril route daily 1 Inhaler 5    magnesium oxide (MAG-OX) 400 (241.3 Mg) MG TABS tablet TAKE ONE TABLET BY MOUTH  DAILY 90 tablet 4    sildenafil (REVATIO) 20 MG tablet TAKE TWO TABLETS BY MOUTH AS NEEDED ONE  HOUR  PRIOR  TO  INTERCOURSE 30 tablet 7    metFORMIN (GLUCOPHAGE) 500 MG tablet TAKE 1 TABLET BY MOUTH ONCE DAILY IN THE EVENING ALONG WITH  1000  MG TABLET FOR A TOTAL OF 1500 MG 90 tablet 3    metFORMIN (GLUCOPHAGE) 1000 MG tablet TAKE 1 TABLET BY MOUTH TWICE DAILY WITH MEALS 180 tablet 3    albuterol sulfate HFA (VENTOLIN HFA) 108 (90 Base) MCG/ACT inhaler Inhale 2 puffs into the lungs every 4 hours as needed for Wheezing or Shortness of Breath 1 Inhaler 0    Spacer/Aero-Holding Chambers (E-Z SPACER) SARA Use with albuterol inhaler 1 Device 0    hydroCHLOROthiazide (MICROZIDE) 12.5 MG capsule Take 1 capsule by mouth once daily 90 capsule 3    doxazosin (CARDURA) 4 MG tablet Take 1 tablet by mouth nightly 90 tablet 3    carvedilol (COREG) 25 MG tablet Take 1 tablet by mouth twice daily 180 tablet 3    carvedilol (COREG) 25 MG tablet TAKE ONE TABLET BY MOUTH TWICE DAILY 180 tablet 3  ibuprofen (ADVIL;MOTRIN) 200 MG tablet Take 400 mg by mouth every 6 hours as needed for Pain      diclofenac sodium 1 % GEL Apply 2 g topically 2 times daily To painful joints of hands 1 Tube 3    Dulaglutide (TRULICITY) 1.5 KR/5.5BB SOPN Inject into the skin      Vitamin D, Cholecalciferol, 400 units CAPS Take 1 capsule by mouth daily 30 capsule 11    Multiple Vitamins-Minerals (THERAPEUTIC MULTIVITAMIN-MINERALS) tablet Take 1 tablet by mouth daily      ONE TOUCH ULTRA TEST STRP       Cinnamon 500 MG TABS Take 1,000 mg by mouth daily       aspirin 81 MG tablet Take 81 mg by mouth daily      Dextromethorphan-guaiFENesin (TUSSIN DM PO) Take by mouth Indications: Patient is taking 1 tsp prn      guaiFENesin (MUCINEX) 600 MG extended release tablet Take 1 tablet by mouth 2 times daily Take 1-2 times twice per day as needed for chest or nasal congestion (Patient not taking: Reported on 2/15/2021) 30 tablet 0     No current facility-administered medications for this visit.       Allergies   Allergen Reactions    Crestor [Rosuvastatin]      Muscle aches    Lipitor [Atorvastatin]      Back ache    Amlodipine Swelling     Leg swelling     Health Maintenance   Topic Date Due    COVID-19 Vaccine (1 of 2) [birth year]/1967    Shingles Vaccine (1 of 2) 03/16/2001    Diabetic retinal exam  02/17/2021    Colon cancer screen colonoscopy  07/26/2021    A1C test (Diabetic or Prediabetic)  11/03/2021    Potassium monitoring  11/03/2021    Creatinine monitoring  11/03/2021    Diabetic foot exam  11/10/2021    Diabetic microalbuminuria test  11/10/2021    Annual Wellness Visit (AWV)  11/11/2021    Lipid screen  11/23/2021    DTaP/Tdap/Td vaccine (2 - Td) 08/26/2024    Flu vaccine  Completed    Pneumococcal 65+ years Vaccine  Completed    AAA screen  Completed    Hepatitis C screen  Completed    Hepatitis A vaccine  Aged Out    Hib vaccine  Aged Out    Meningococcal (ACWY) vaccine  Aged Out       Subjective: Review of Systems   Constitutional: Negative. HENT: Positive for congestion. Eyes: Negative. Respiratory: Positive for cough, chest tightness and shortness of breath. Cardiovascular: Positive for chest pain. Negative for palpitations and leg swelling. Gastrointestinal: Negative. Musculoskeletal: Negative. Skin: Negative. Neurological: Positive for headaches. Objective:     Vitals:    02/15/21 0907   BP: 130/62   Site: Left Upper Arm   Position: Sitting   Cuff Size: Large Adult   Pulse: 74   Resp: 18   Temp: 97.1 °F (36.2 °C)   TempSrc: Temporal   SpO2: 98%   Weight: 193 lb 6.4 oz (87.7 kg)   Height: 5' 9\" (1.753 m)       Physical Exam  Constitutional:       Appearance: He is well-developed. HENT:      Head: Normocephalic. Right Ear: Tympanic membrane and external ear normal.      Left Ear: Tympanic membrane and external ear normal.      Nose: Nose normal.   Neck:      Musculoskeletal: Normal range of motion and neck supple. Cardiovascular:      Rate and Rhythm: Normal rate and regular rhythm. Heart sounds: Normal heart sounds. No murmur. No friction rub. No gallop. Pulmonary:      Effort: Pulmonary effort is normal.      Breath sounds: Normal breath sounds. Abdominal:      General: Bowel sounds are normal.      Palpations: Abdomen is soft. Tenderness: There is no abdominal tenderness. There is no guarding. Musculoskeletal: Normal range of motion. Lymphadenopathy:      Cervical: No cervical adenopathy. Skin:     General: Skin is warm. Neurological:      Mental Status: He is alert and oriented to person, place, and time. Deep Tendon Reflexes: Reflexes are normal and symmetric.       ekg-  No acute changes. Low voltage in limb leads    Assessment:      Diagnosis Orders   1.  Chest pain, unspecified type  EKG 12 Lead    CBC Auto Differential    Comprehensive Metabolic Panel    Stress test, myoview 2. Type 2 diabetes mellitus with hyperglycemia, with long-term current use of insulin ([address]     3. Hyperparathyroidism ([facility]     4. Nonintractable headache, unspecified chronicity pattern, unspecified headache type     5. Cough  XR CHEST (2 VW)    COVID-19 Ambulatory    Stress test, myoview   6. Abnormal EKG  Stress test, myoview       Plan:      Return in about 2 weeks (around 3/1/2021). Orders Placed This Encounter   Procedures    XR CHEST (2 VW)     Standing Status:   Future     Number of Occurrences:   1     Standing Expiration Date:   2/15/2022    COVID-19 Ambulatory     Standing Status:   Future     Standing Expiration Date:   2/15/2022     Scheduling Instructions:      Saline media preferred given current shortage of viral transport media but both acceptable     Order Specific Question:   Is this test for diagnosis or screening? Answer:   Diagnosis of ill patient     Order Specific Question:   Symptomatic for COVID-19 as defined by CDC? Answer:   Yes     Order Specific Question:   Date of Symptom Onset     Answer:   2/1/2021     Order Specific Question:   Hospitalized for COVID-19? Answer:   No     Order Specific Question:   Admitted to ICU for COVID-19? Answer:   No     Order Specific Question:   Employed in healthcare setting? Answer:   No     Order Specific Question:   Resident in a congregate (group) care setting? Answer:   No     Order Specific Question:   Pregnant: Answer:   No     Order Specific Question:   Previously tested for COVID-19? Answer:    Yes    CBC Auto Differential     Standing Status:   Future     Number of Occurrences:   1     Standing Expiration Date:   2/15/2022    Comprehensive Metabolic Panel     Standing Status:   Future     Number of Occurrences:   1     Standing Expiration Date:   2/15/2022    Stress test, myoview     Standing Status:   Future     Standing Expiration Date:   2/15/2022     Order Specific Question:   Reason for Exam? Answer:   Chest pain     Order Specific Question:   Reason for Exam?     Answer:   EKG abnormalities    EKG 12 Lead     Order Specific Question:   Reason for Exam?     Answer:   Chest pain     No orders of the defined types were placed in this encounter. See orders, his overall symptoms were nonspecific but do have concern it being cardiac    Will notify pt of test results when they are available. If they are not notified they are to call office for the result  Fu in 2 weeks        Patient given educational materials - seepatient instructions. Discussed use, benefit, and side effects of prescribed medications. All patient questions answered. Pt voiced understanding. Patient agreed withtreatment plan. Follow up as directed.      Electronically signed by OMID Heath CNP on 2/15/2021 at 5:06 PM

## 2021-02-16 LAB
ALBUMIN SERPL-MCNC: 4.4 G/DL (ref 3.5–5.1)
ALP BLD-CCNC: 58 U/L (ref 38–126)
ALT SERPL-CCNC: 18 U/L (ref 11–66)
ANION GAP SERPL CALCULATED.3IONS-SCNC: 6 MEQ/L (ref 8–16)
AST SERPL-CCNC: 16 U/L (ref 5–40)
BILIRUB SERPL-MCNC: 0.3 MG/DL (ref 0.3–1.2)
BUN BLDV-MCNC: 22 MG/DL (ref 7–22)
CALCIUM SERPL-MCNC: 11.1 MG/DL (ref 8.5–10.5)
CHLORIDE BLD-SCNC: 99 MEQ/L (ref 98–111)
CO2: 32 MEQ/L (ref 23–33)
CREAT SERPL-MCNC: 0.9 MG/DL (ref 0.4–1.2)
GFR SERPL CREATININE-BSD FRML MDRD: 83 ML/MIN/1.73M2
GLUCOSE BLD-MCNC: 190 MG/DL (ref 70–108)
POTASSIUM SERPL-SCNC: 4.8 MEQ/L (ref 3.5–5.2)
SODIUM BLD-SCNC: 137 MEQ/L (ref 135–145)
TOTAL PROTEIN: 7.2 G/DL (ref 6.1–8)

## 2021-02-17 LAB — SARS-COV-2: NOT DETECTED

## 2021-02-23 ENCOUNTER — HOSPITAL ENCOUNTER (OUTPATIENT)
Dept: NON INVASIVE DIAGNOSTICS | Age: 71
Discharge: HOME | End: 2021-02-23
Payer: MEDICARE

## 2021-02-23 VITALS — WEIGHT: 187 LBS | BODY MASS INDEX: 26.77 KG/M2 | HEIGHT: 70 IN

## 2021-02-23 DIAGNOSIS — R05.9 COUGH: ICD-10-CM

## 2021-02-23 DIAGNOSIS — R07.9 CHEST PAIN, UNSPECIFIED TYPE: ICD-10-CM

## 2021-02-23 DIAGNOSIS — R94.31 ABNORMAL EKG: ICD-10-CM

## 2021-02-23 LAB
LV EF: 67 %
LVEF MODALITY: NORMAL

## 2021-02-23 PROCEDURE — 3430000000 HC RX DIAGNOSTIC RADIOPHARMACEUTICAL: Performed by: INTERNAL MEDICINE

## 2021-02-23 PROCEDURE — 93017 CV STRESS TEST TRACING ONLY: CPT | Performed by: INTERNAL MEDICINE

## 2021-02-23 PROCEDURE — 78452 HT MUSCLE IMAGE SPECT MULT: CPT

## 2021-02-23 PROCEDURE — A9500 TC99M SESTAMIBI: HCPCS | Performed by: INTERNAL MEDICINE

## 2021-02-23 RX ADMIN — Medication 9.6 MILLICURIE: at 14:20

## 2021-02-23 RX ADMIN — Medication 30.1 MILLICURIE: at 15:40

## 2021-03-02 ENCOUNTER — OFFICE VISIT (OUTPATIENT)
Dept: FAMILY MEDICINE CLINIC | Age: 71
End: 2021-03-02
Payer: MEDICARE

## 2021-03-02 VITALS
HEIGHT: 70 IN | DIASTOLIC BLOOD PRESSURE: 66 MMHG | SYSTOLIC BLOOD PRESSURE: 128 MMHG | TEMPERATURE: 97.2 F | HEART RATE: 60 BPM | BODY MASS INDEX: 27.49 KG/M2 | WEIGHT: 192 LBS | OXYGEN SATURATION: 96 % | RESPIRATION RATE: 16 BRPM

## 2021-03-02 DIAGNOSIS — I10 ESSENTIAL HYPERTENSION: ICD-10-CM

## 2021-03-02 DIAGNOSIS — E11.65 TYPE 2 DIABETES MELLITUS WITH HYPERGLYCEMIA, WITH LONG-TERM CURRENT USE OF INSULIN (HCC): Primary | ICD-10-CM

## 2021-03-02 DIAGNOSIS — Z79.4 TYPE 2 DIABETES MELLITUS WITH HYPERGLYCEMIA, WITH LONG-TERM CURRENT USE OF INSULIN (HCC): Primary | ICD-10-CM

## 2021-03-02 LAB — HBA1C MFR BLD: 6.5 % (ref 4.3–5.7)

## 2021-03-02 PROCEDURE — G8417 CALC BMI ABV UP PARAM F/U: HCPCS | Performed by: NURSE PRACTITIONER

## 2021-03-02 PROCEDURE — 3044F HG A1C LEVEL LT 7.0%: CPT | Performed by: NURSE PRACTITIONER

## 2021-03-02 PROCEDURE — G8484 FLU IMMUNIZE NO ADMIN: HCPCS | Performed by: NURSE PRACTITIONER

## 2021-03-02 PROCEDURE — 2022F DILAT RTA XM EVC RTNOPTHY: CPT | Performed by: NURSE PRACTITIONER

## 2021-03-02 PROCEDURE — 4040F PNEUMOC VAC/ADMIN/RCVD: CPT | Performed by: NURSE PRACTITIONER

## 2021-03-02 PROCEDURE — 1036F TOBACCO NON-USER: CPT | Performed by: NURSE PRACTITIONER

## 2021-03-02 PROCEDURE — 99214 OFFICE O/P EST MOD 30 MIN: CPT | Performed by: NURSE PRACTITIONER

## 2021-03-02 PROCEDURE — G8427 DOCREV CUR MEDS BY ELIG CLIN: HCPCS | Performed by: NURSE PRACTITIONER

## 2021-03-02 PROCEDURE — 83036 HEMOGLOBIN GLYCOSYLATED A1C: CPT | Performed by: NURSE PRACTITIONER

## 2021-03-02 PROCEDURE — 3017F COLORECTAL CA SCREEN DOC REV: CPT | Performed by: NURSE PRACTITIONER

## 2021-03-02 PROCEDURE — 1123F ACP DISCUSS/DSCN MKR DOCD: CPT | Performed by: NURSE PRACTITIONER

## 2021-03-02 RX ORDER — DOXAZOSIN MESYLATE 4 MG/1
4 TABLET ORAL NIGHTLY
Qty: 90 TABLET | Refills: 3 | Status: SHIPPED | OUTPATIENT
Start: 2021-03-02 | End: 2022-01-18 | Stop reason: SDUPTHER

## 2021-03-02 RX ORDER — OMEGA-3S/DHA/EPA/FISH OIL/D3 300MG-1000
400 CAPSULE ORAL 2 TIMES DAILY
COMMUNITY

## 2021-03-02 RX ORDER — CARVEDILOL 25 MG/1
TABLET ORAL
Qty: 180 TABLET | Refills: 3 | Status: SHIPPED | OUTPATIENT
Start: 2021-03-02 | End: 2022-01-18 | Stop reason: SDUPTHER

## 2021-03-02 ASSESSMENT — PATIENT HEALTH QUESTIONNAIRE - PHQ9
1. LITTLE INTEREST OR PLEASURE IN DOING THINGS: 0
SUM OF ALL RESPONSES TO PHQ QUESTIONS 1-9: 0
SUM OF ALL RESPONSES TO PHQ QUESTIONS 1-9: 0
SUM OF ALL RESPONSES TO PHQ9 QUESTIONS 1 & 2: 0
2. FEELING DOWN, DEPRESSED OR HOPELESS: 0

## 2021-03-02 ASSESSMENT — ENCOUNTER SYMPTOMS
GASTROINTESTINAL NEGATIVE: 1
EYES NEGATIVE: 1
RESPIRATORY NEGATIVE: 1

## 2021-03-02 NOTE — PROGRESS NOTES
Joss Fairchild is a 72 y.o. male whopresents today for :  Chief Complaint   Patient presents with    Follow-up     Chest pain, DM2       HPI:     HPI  Pt here for fu. Reports his chest pain is about 80% improved. Continues to improve slowly. His stress test was ok. His sob is improved. a1c was checked today and very good. Reviewed xray.        Patient Active Problem List   Diagnosis    Hyperlipidemia    Type 2 diabetes mellitus with hyperglycemia, with long-term current use of insulin ([address]    Essential hypertension    Hypercalcemia    Vitamin D insufficiency    GERD (gastroesophageal reflux disease)    Has stopped breathing    Heart murmur    LEW treated with BiPAP    Hyperparathyroidism ([address]    Hypomagnesemia    Normocytic anemia    Iron deficiency anemia    Male erectile dysfunction, unspecified        Past Medical History:   Diagnosis Date    Erectile dysfunction     Hyperlipidemia     Hypertension     LEW treated with BiPAP     Type 2 diabetes mellitus ([address]       Past Surgical History:   Procedure Laterality Date    APPENDECTOMY  as teenager    BACK SURGERY      herniated disc- L5-S1    CATARACT REMOVAL Bilateral     COLONOSCOPY  2016    ENDOSCOPIC ULTRASOUND (UPPER)  2017    EYE SURGERY Right 2005    SKIN CANCER EXCISION Left     left hand     TONSILLECTOMY      as teenager    UPPER GASTROINTESTINAL ENDOSCOPY       Family History   Problem Relation Age of Onset    Diabetes Mother     Cancer Mother         lung    Diabetes Father     Kidney Disease Father     Heart Attack Paternal Grandfather 72     Social History     Tobacco Use    Smoking status: Former Smoker     Packs/day: 1.25     Years: 20.00     Pack years: 25.00     Quit date: 1995     Years since quittin.1    Smokeless tobacco: Former User     Types: Chew     Quit date: 1990   Substance Use Topics    Alcohol use: No     Alcohol/week: 0.0 standard drinks Current Outpatient Medications   Medication Sig Dispense Refill    vitamin D3 (CHOLECALCIFEROL) 10 MCG (400 UNIT) TABS tablet Take 400 Units by mouth 2 times daily      carvedilol (COREG) 25 MG tablet Take 1 tablet by mouth twice daily 180 tablet 3    doxazosin (CARDURA) 4 MG tablet Take 1 tablet by mouth nightly 90 tablet 3    candesartan (ATACAND) 32 MG tablet Take 1 tablet by mouth once daily for blood pressure 90 tablet 3    glimepiride (AMARYL) 4 MG tablet TAKE 1 TABLET BY MOUTH IN THE MORNING AND 1/2 TABLET IN THE EVENING WITH SUPPER 135 tablet 1    pravastatin (PRAVACHOL) 20 MG tablet TAKE 1 TABLET BY MOUTH IN THE EVENING FOR CHOLESTEROL 90 tablet 3    mometasone (NASONEX) 50 MCG/ACT nasal spray 2 sprays by Each Nostril route daily 1 Inhaler 5    magnesium oxide (MAG-OX) 400 (241.3 Mg) MG TABS tablet TAKE ONE TABLET BY MOUTH  DAILY 90 tablet 4    sildenafil (REVATIO) 20 MG tablet TAKE TWO TABLETS BY MOUTH AS NEEDED ONE  HOUR  PRIOR  TO  INTERCOURSE 30 tablet 7    metFORMIN (GLUCOPHAGE) 500 MG tablet TAKE 1 TABLET BY MOUTH ONCE DAILY IN THE EVENING ALONG WITH  1000  MG TABLET FOR A TOTAL OF 1500 MG 90 tablet 3    metFORMIN (GLUCOPHAGE) 1000 MG tablet TAKE 1 TABLET BY MOUTH TWICE DAILY WITH MEALS (Patient taking differently: Take 1,000 mg by mouth 2 times daily (with meals) ) 180 tablet 3    albuterol sulfate HFA (VENTOLIN HFA) 108 (90 Base) MCG/ACT inhaler Inhale 2 puffs into the lungs every 4 hours as needed for Wheezing or Shortness of Breath 1 Inhaler 0    Spacer/Aero-Holding Chambers (E-Z SPACER) SARA Use with albuterol inhaler 1 Device 0    hydroCHLOROthiazide (MICROZIDE) 12.5 MG capsule Take 1 capsule by mouth once daily 90 capsule 3    diclofenac sodium 1 % GEL Apply 2 g topically 2 times daily To painful joints of hands 1 Tube 3    Dulaglutide (TRULICITY) 1.5 LA/5.8YD SOPN Inject into the skin  Multiple Vitamins-Minerals (THERAPEUTIC MULTIVITAMIN-MINERALS) tablet Take 1 tablet by mouth daily      ONE TOUCH ULTRA TEST STRP       Cinnamon 500 MG TABS Take 1,000 mg by mouth daily       aspirin 81 MG tablet Take 81 mg by mouth daily      ibuprofen (ADVIL;MOTRIN) 200 MG tablet Take 400 mg by mouth every 6 hours as needed for Pain       No current facility-administered medications for this visit. Allergies   Allergen Reactions    Crestor [Rosuvastatin]      Muscle aches    Lipitor [Atorvastatin]      Back ache    Amlodipine Swelling     Leg swelling     Health Maintenance   Topic Date Due    Shingles Vaccine (1 of 2) Never done    Diabetic retinal exam  02/17/2021    COVID-19 Vaccine (2 of 2 - Moderna series) 03/25/2021    Colon cancer screen colonoscopy  07/26/2021    Diabetic foot exam  11/10/2021    Diabetic microalbuminuria test  11/10/2021    Annual Wellness Visit (AWV)  11/11/2021    Lipid screen  11/23/2021    Potassium monitoring  02/15/2022    Creatinine monitoring  02/15/2022    A1C test (Diabetic or Prediabetic)  03/02/2022    DTaP/Tdap/Td vaccine (2 - Td) 08/26/2024    Flu vaccine  Completed    Pneumococcal 65+ years Vaccine  Completed    AAA screen  Completed    Hepatitis C screen  Completed    Hepatitis A vaccine  Aged Out    Hib vaccine  Aged Out    Meningococcal (ACWY) vaccine  Aged Out       Subjective:     Review of Systems   Constitutional: Negative. HENT: Negative. Eyes: Negative. Respiratory: Negative. Cardiovascular: Negative. Gastrointestinal: Negative. Musculoskeletal: Negative. Skin: Negative. Neurological: Negative.         Objective:     Vitals:    03/02/21 0825   BP: 128/66   Site: Right Upper Arm   Position: Sitting   Cuff Size: Large Adult   Pulse: 60   Resp: 16   Temp: 97.2 °F (36.2 °C)   TempSrc: Temporal   SpO2: 96%   Weight: 192 lb (87.1 kg)   Height: 5' 10\" (1.778 m)       Physical Exam  Constitutional: Appearance: He is well-developed. HENT:      Head: Normocephalic. Right Ear: Tympanic membrane and external ear normal.      Left Ear: Tympanic membrane and external ear normal.      Nose: Nose normal.   Neck:      Musculoskeletal: Normal range of motion and neck supple. Cardiovascular:      Rate and Rhythm: Normal rate and regular rhythm. Heart sounds: Normal heart sounds. No murmur. No friction rub. No gallop. Pulmonary:      Effort: Pulmonary effort is normal.      Breath sounds: Normal breath sounds. No wheezing or rales. Abdominal:      General: Bowel sounds are normal.      Palpations: Abdomen is soft. Tenderness: There is no abdominal tenderness. There is no guarding. Musculoskeletal: Normal range of motion. Lymphadenopathy:      Cervical: No cervical adenopathy. Skin:     General: Skin is warm. Neurological:      Mental Status: He is alert and oriented to person, place, and time. Deep Tendon Reflexes: Reflexes are normal and symmetric. Assessment:      Diagnosis Orders   1. Type 2 diabetes mellitus with hyperglycemia, with long-term current use of insulin ([facility])  POCT glycosylated hemoglobin (Hb A1C)   2. Essential hypertension  carvedilol (COREG) 25 MG tablet    doxazosin (CARDURA) 4 MG tablet       Plan:      No follow-ups on file.        Orders Placed This Encounter   Procedures    POCT glycosylated hemoglobin (Hb A1C)     Orders Placed This Encounter   Medications    carvedilol (COREG) 25 MG tablet     Sig: Take 1 tablet by mouth twice daily     Dispense:  180 tablet     Refill:  3    doxazosin (CARDURA) 4 MG tablet     Sig: Take 1 tablet by mouth nightly     Dispense:  90 tablet     Refill:  3      Cont meds  Pt is improved  Fu as scheduled Patient given educational materials - seepatient instructions. Discussed use, benefit, and side effects of prescribed medications. All patient questions answered. Pt voiced understanding. Patient agreed withtreatment plan. Follow up as directed.      Electronically signed by OMID Mann CNP on 3/2/2021 at 4:59 PM

## 2021-05-10 ENCOUNTER — OFFICE VISIT (OUTPATIENT)
Dept: FAMILY MEDICINE CLINIC | Age: 71
End: 2021-05-10
Payer: MEDICARE

## 2021-05-10 VITALS
WEIGHT: 200 LBS | HEART RATE: 66 BPM | DIASTOLIC BLOOD PRESSURE: 70 MMHG | TEMPERATURE: 97.2 F | RESPIRATION RATE: 16 BRPM | SYSTOLIC BLOOD PRESSURE: 124 MMHG | BODY MASS INDEX: 28.7 KG/M2

## 2021-05-10 DIAGNOSIS — K21.9 GASTROESOPHAGEAL REFLUX DISEASE, UNSPECIFIED WHETHER ESOPHAGITIS PRESENT: Primary | ICD-10-CM

## 2021-05-10 DIAGNOSIS — E83.52 HYPERCALCEMIA: ICD-10-CM

## 2021-05-10 DIAGNOSIS — E11.65 TYPE 2 DIABETES MELLITUS WITH HYPERGLYCEMIA, WITH LONG-TERM CURRENT USE OF INSULIN (HCC): ICD-10-CM

## 2021-05-10 DIAGNOSIS — Z79.4 TYPE 2 DIABETES MELLITUS WITH HYPERGLYCEMIA, WITH LONG-TERM CURRENT USE OF INSULIN (HCC): ICD-10-CM

## 2021-05-10 PROCEDURE — G8427 DOCREV CUR MEDS BY ELIG CLIN: HCPCS | Performed by: FAMILY MEDICINE

## 2021-05-10 PROCEDURE — 2022F DILAT RTA XM EVC RTNOPTHY: CPT | Performed by: FAMILY MEDICINE

## 2021-05-10 PROCEDURE — 1123F ACP DISCUSS/DSCN MKR DOCD: CPT | Performed by: FAMILY MEDICINE

## 2021-05-10 PROCEDURE — G8417 CALC BMI ABV UP PARAM F/U: HCPCS | Performed by: FAMILY MEDICINE

## 2021-05-10 PROCEDURE — 4040F PNEUMOC VAC/ADMIN/RCVD: CPT | Performed by: FAMILY MEDICINE

## 2021-05-10 PROCEDURE — 99214 OFFICE O/P EST MOD 30 MIN: CPT | Performed by: FAMILY MEDICINE

## 2021-05-10 PROCEDURE — 1036F TOBACCO NON-USER: CPT | Performed by: FAMILY MEDICINE

## 2021-05-10 PROCEDURE — 3044F HG A1C LEVEL LT 7.0%: CPT | Performed by: FAMILY MEDICINE

## 2021-05-10 PROCEDURE — 3017F COLORECTAL CA SCREEN DOC REV: CPT | Performed by: FAMILY MEDICINE

## 2021-05-10 RX ORDER — FAMOTIDINE 20 MG/1
20 TABLET, FILM COATED ORAL 2 TIMES DAILY
Qty: 60 TABLET | Refills: 5 | Status: SHIPPED | OUTPATIENT
Start: 2021-05-10 | End: 2021-11-22

## 2021-05-10 NOTE — PROGRESS NOTES
Sherley Ramirez is a 80 y.o. male who presents today for:   Chief Complaint   Patient presents with    6 Month Follow-Up    Other     phlegm in throat, \"tickle in throat after eating\"    Discuss Medications     acid reflux meds. Patient does not want omeprazole          HPI:      HPI     HTN- BP controlled. No cp/sob/ha/dizziness. Tolerating meds well with no side effects. Recent stress test ok  CHest pain/heaviness resolved. Will have sensation of phlegm in throat, occ able to cough some mucus up  X 6-12 mos, worse x 6 mos  After gets done eating almost every meal  Worse with eating larger meal  Denies specific reflux sensation at that time  No increase runny nose   No food getting stuck   Coughing sometimes helps    Has reflux occasionally  Was on omeprazole in past and changed to zantac by another physician but zantac then was recalled so stopped it. Notes increase in reflux more so recently      DMII-  Lab Results   Component Value Date    LABA1C 6.5 (H) 03/02/2021     No results found for: EAG   Rarely checks sugars  fastings 140-150s  Better during the day   Rare lows   Is no longer following with endo for DMII and will be managed in this office. Hypercalcemia-     Stable and Dr. Ashley Rodriguez (endo) recommended just f/u in 1 year  States next f/u with Dr. Ashley Rodriguez in 1 year        Patient's medications, allergies, past medical, surgical, social,and family histories were reviewed and updated as appropriate.     Outpatient Medications Prior to Visit   Medication Sig Dispense Refill    vitamin D3 (CHOLECALCIFEROL) 10 MCG (400 UNIT) TABS tablet Take 400 Units by mouth 2 times daily      carvedilol (COREG) 25 MG tablet Take 1 tablet by mouth twice daily 180 tablet 3    doxazosin (CARDURA) 4 MG tablet Take 1 tablet by mouth nightly 90 tablet 3    candesartan (ATACAND) 32 MG tablet Take 1 tablet by mouth once daily for blood pressure 90 tablet 3    glimepiride (AMARYL) 4 MG tablet TAKE 1 TABLET BY MOUTH IN THE MORNING AND 1/2 TABLET IN THE EVENING WITH SUPPER 135 tablet 1    pravastatin (PRAVACHOL) 20 MG tablet TAKE 1 TABLET BY MOUTH IN THE EVENING FOR CHOLESTEROL 90 tablet 3    mometasone (NASONEX) 50 MCG/ACT nasal spray 2 sprays by Each Nostril route daily 1 Inhaler 5    magnesium oxide (MAG-OX) 400 (241.3 Mg) MG TABS tablet TAKE ONE TABLET BY MOUTH  DAILY 90 tablet 4    sildenafil (REVATIO) 20 MG tablet TAKE TWO TABLETS BY MOUTH AS NEEDED ONE  HOUR  PRIOR  TO  INTERCOURSE 30 tablet 7    metFORMIN (GLUCOPHAGE) 500 MG tablet TAKE 1 TABLET BY MOUTH ONCE DAILY IN THE EVENING ALONG WITH  1000  MG TABLET FOR A TOTAL OF 1500 MG 90 tablet 3    metFORMIN (GLUCOPHAGE) 1000 MG tablet TAKE 1 TABLET BY MOUTH TWICE DAILY WITH MEALS (Patient taking differently: Take 1,000 mg by mouth 2 times daily (with meals) ) 180 tablet 3    albuterol sulfate HFA (VENTOLIN HFA) 108 (90 Base) MCG/ACT inhaler Inhale 2 puffs into the lungs every 4 hours as needed for Wheezing or Shortness of Breath 1 Inhaler 0    Spacer/Aero-Holding Chambers (E-Z SPACER) SARA Use with albuterol inhaler 1 Device 0    hydroCHLOROthiazide (MICROZIDE) 12.5 MG capsule Take 1 capsule by mouth once daily 90 capsule 3    ibuprofen (ADVIL;MOTRIN) 200 MG tablet Take 400 mg by mouth every 6 hours as needed for Pain      diclofenac sodium 1 % GEL Apply 2 g topically 2 times daily To painful joints of hands 1 Tube 3    Dulaglutide (TRULICITY) 1.5 KP/8.5XC SOPN Inject into the skin      Multiple Vitamins-Minerals (THERAPEUTIC MULTIVITAMIN-MINERALS) tablet Take 1 tablet by mouth daily      ONE TOUCH ULTRA TEST STRP       Cinnamon 500 MG TABS Take 1,000 mg by mouth daily       aspirin 81 MG tablet Take 81 mg by mouth daily       No facility-administered medications prior to visit.          Subjective:     Review of Systems   Constitutional: Negative for activity change, appetite change, chills, diaphoresis, fatigue, fever and unexpected weight change. HENT: Negative for congestion. Respiratory: Negative for cough, shortness of breath, wheezing and stridor. Cardiovascular: Negative for chest pain, palpitations and leg swelling. Gastrointestinal: Negative for abdominal pain, blood in stool, constipation, diarrhea, nausea and vomiting. Genitourinary: Negative for difficulty urinating. Neurological: Negative for headaches. Objective:     Vitals:    05/10/21 1307   BP: 124/70   Site: Left Upper Arm   Position: Sitting   Cuff Size: Large Adult   Pulse: 66   Resp: 16   Temp: 97.2 °F (36.2 °C)   TempSrc: Temporal   Weight: 200 lb (90.7 kg)     Wt Readings from Last 3 Encounters:   05/10/21 200 lb (90.7 kg)   03/02/21 192 lb (87.1 kg)   02/23/21 187 lb (84.8 kg)     Physical Exam  Vitals and nursing note reviewed. Constitutional:       General: He is not in acute distress. Appearance: He is well-developed. He is not diaphoretic. HENT:      Head: Normocephalic and atraumatic. Neck:      Thyroid: No thyromegaly. Cardiovascular:      Rate and Rhythm: Normal rate and regular rhythm. Heart sounds: Normal heart sounds. No murmur heard. No friction rub. No gallop. Pulmonary:      Effort: Pulmonary effort is normal. No respiratory distress. Breath sounds: Normal breath sounds. No stridor. No wheezing or rales. Chest:      Chest wall: No tenderness. Musculoskeletal:      Cervical back: Neck supple. Lymphadenopathy:      Cervical: No cervical adenopathy. Assessment/Plan:      Diagnosis Orders   1. Gastroesophageal reflux disease, unspecified whether esophagitis present  famotidine (PEPCID) 20 MG tablet   2. Hypercalcemia     3. Type 2 diabetes mellitus with hyperglycemia, with long-term current use of insulin ([facility])       Possible worsened gerd as cause of throat irritation   Trial of pepcid    Yearly eye exam.  Check feet daily. Work on [employer] and increasing exercise.   Call for persistently elevated

## 2021-05-16 ASSESSMENT — ENCOUNTER SYMPTOMS
NAUSEA: 0
STRIDOR: 0
DIARRHEA: 0
ABDOMINAL PAIN: 0
COUGH: 0
VOMITING: 0
CONSTIPATION: 0
BLOOD IN STOOL: 0
WHEEZING: 0
SHORTNESS OF BREATH: 0

## 2021-06-30 DIAGNOSIS — I10 ESSENTIAL HYPERTENSION: ICD-10-CM

## 2021-06-30 RX ORDER — HYDROCHLOROTHIAZIDE 12.5 MG/1
CAPSULE, GELATIN COATED ORAL
Qty: 90 CAPSULE | Refills: 0 | Status: SHIPPED | OUTPATIENT
Start: 2021-06-30 | End: 2021-09-27

## 2021-06-30 RX ORDER — GLIMEPIRIDE 4 MG/1
TABLET ORAL
Qty: 135 TABLET | Refills: 0 | Status: SHIPPED | OUTPATIENT
Start: 2021-06-30 | End: 2021-09-27

## 2021-08-09 ASSESSMENT — ENCOUNTER SYMPTOMS
COUGH: 0
COLOR CHANGE: 0
SHORTNESS OF BREATH: 0
RHINORRHEA: 0
NAUSEA: 0
SORE THROAT: 0
HEARTBURN: 1
VOMITING: 0

## 2021-08-09 NOTE — PROGRESS NOTES
[address]  [address] [place] [address]  Phone:  [phone]        Name: Lina Raymond  : 1950    Chief Complaint   Patient presents with    Diabetes     follow-up       HPI:     Lina Raymond is a 80 y.o. male who presents today for follow up of T2DM, hypertension, and GERD. His last HbA1c was 6.5% in March. He eats what he wants. He's been working in his garden. He's had his COVID vaccines. He is looking forward to going to [employer] in October. Diabetes  He presents for his follow-up diabetic visit. He has type 2 diabetes mellitus. There are no hypoglycemic associated symptoms. Pertinent negatives for hypoglycemia include no nervousness/anxiousness. Pertinent negatives for diabetes include no chest pain. There are no hypoglycemic complications. Risk factors for coronary artery disease include diabetes mellitus, hypertension and male sex. Current diabetic treatment includes oral agent (monotherapy). He is compliant with treatment most of the time. He is following a generally healthy diet. When asked about meal planning, he reported none. An ACE inhibitor/angiotensin II receptor blocker is being taken. Hypertension  This is a chronic problem. The current episode started more than 1 year ago. The problem is unchanged. The problem is controlled. Pertinent negatives include no chest pain, palpitations or shortness of breath. Past treatments include angiotensin blockers, beta blockers and diuretics. The current treatment provides moderate improvement. Gastroesophageal Reflux  He complains of heartburn. He reports no chest pain, no coughing, no nausea or no sore throat. This is a chronic problem. The current episode started more than 1 year ago. The problem occurs occasionally. The problem has been waxing and waning. The symptoms are aggravated by certain foods. He has tried a PPI for the symptoms. Cutting out coffee has helped.     Lab Results   Component Value Date    CHOL 135 03/05/2018    TRIG 119 03/05/2018    HDL 36 11/23/2020    LDLCALC 67 11/23/2020     Lab Results   Component Value Date    ALT 18 02/15/2021    AST 16 02/15/2021        Lab Results   Component Value Date     02/15/2021    K 4.8 02/15/2021    CL 99 02/15/2021    CO2 32 02/15/2021    BUN 22 02/15/2021    CREATININE 0.9 02/15/2021    GLUCOSE 190 02/15/2021    CALCIUM 11.1 02/15/2021          Current Outpatient Medications:     BiPAP Machine MISC, by Does not apply route, Disp: , Rfl:     glimepiride (AMARYL) 4 MG tablet, TAKE 1 TABLET BY MOUTH IN THE MORNING AND 1/2 TABLET IN THE EVENING WITH SUPPER, Disp: 135 tablet, Rfl: 0    hydroCHLOROthiazide (MICROZIDE) 12.5 MG capsule, Take 1 capsule by mouth once daily, Disp: 90 capsule, Rfl: 0    famotidine (PEPCID) 20 MG tablet, Take 1 tablet by mouth 2 times daily, Disp: 60 tablet, Rfl: 5    vitamin D3 (CHOLECALCIFEROL) 10 MCG (400 UNIT) TABS tablet, Take 400 Units by mouth 2 times daily, Disp: , Rfl:     carvedilol (COREG) 25 MG tablet, Take 1 tablet by mouth twice daily, Disp: 180 tablet, Rfl: 3    doxazosin (CARDURA) 4 MG tablet, Take 1 tablet by mouth nightly, Disp: 90 tablet, Rfl: 3    candesartan (ATACAND) 32 MG tablet, Take 1 tablet by mouth once daily for blood pressure, Disp: 90 tablet, Rfl: 3    pravastatin (PRAVACHOL) 20 MG tablet, TAKE 1 TABLET BY MOUTH IN THE EVENING FOR CHOLESTEROL, Disp: 90 tablet, Rfl: 3    mometasone (NASONEX) 50 MCG/ACT nasal spray, 2 sprays by Each Nostril route daily, Disp: 1 Inhaler, Rfl: 5    magnesium oxide (MAG-OX) 400 (241.3 Mg) MG TABS tablet, TAKE ONE TABLET BY MOUTH  DAILY, Disp: 90 tablet, Rfl: 4    sildenafil (REVATIO) 20 MG tablet, TAKE TWO TABLETS BY MOUTH AS NEEDED ONE  HOUR  PRIOR  TO  INTERCOURSE, Disp: 30 tablet, Rfl: 7    metFORMIN (GLUCOPHAGE) 500 MG tablet, TAKE 1 TABLET BY MOUTH ONCE DAILY IN THE EVENING ALONG WITH  1000  MG TABLET FOR A TOTAL OF 1500 MG, Disp: 90 tablet, Rfl: 3    metFORMIN (GLUCOPHAGE) 1000 MG tablet, TAKE 1 TABLET BY MOUTH TWICE DAILY WITH MEALS (Patient taking differently: Take 1,000 mg by mouth 2 times daily (with meals) ), Disp: 180 tablet, Rfl: 3    albuterol sulfate HFA (VENTOLIN HFA) 108 (90 Base) MCG/ACT inhaler, Inhale 2 puffs into the lungs every 4 hours as needed for Wheezing or Shortness of Breath, Disp: 1 Inhaler, Rfl: 0    ibuprofen (ADVIL;MOTRIN) 200 MG tablet, Take 400 mg by mouth every 6 hours as needed for Pain, Disp: , Rfl:     diclofenac sodium 1 % GEL, Apply 2 g topically 2 times daily To painful joints of hands, Disp: 1 Tube, Rfl: 3    Dulaglutide (TRULICITY) 1.5 JZ/8.3QI SOPN, Inject into the skin, Disp: , Rfl:     Multiple Vitamins-Minerals (THERAPEUTIC MULTIVITAMIN-MINERALS) tablet, Take 1 tablet by mouth daily, Disp: , Rfl:     ONE TOUCH ULTRA TEST STRP,   , Disp: , Rfl:     Cinnamon 500 MG TABS, Take 1,000 mg by mouth daily , Disp: , Rfl:     aspirin 81 MG tablet, Take 81 mg by mouth daily, Disp: , Rfl:     Spacer/Aero-Holding Chambers (E-Z SPACER) SARA, Use with albuterol inhaler (Patient not taking: Reported on 8/10/2021), Disp: 1 Device, Rfl: 0    Allergies   Allergen Reactions    Crestor [Rosuvastatin]      Muscle aches    Lipitor [Atorvastatin]      Back ache    Amlodipine Swelling     Leg swelling       Subjective:      Review of Systems   Constitutional: Negative for chills and fever. HENT: Negative for rhinorrhea and sore throat. Respiratory: Negative for cough and shortness of breath. Cardiovascular: Negative for chest pain and palpitations. Gastrointestinal: Positive for heartburn. Negative for nausea and vomiting. Musculoskeletal: Negative for arthralgias and myalgias. Skin: Negative for color change and rash. Psychiatric/Behavioral: Negative for dysphoric mood. The patient is not nervous/anxious.         Objective:     BP (!) 110/58 (Site: Left Upper Arm, Position: Sitting, Cuff Size: Medium Adult) Pulse 62   Temp 96.9 °F (36.1 °C) (Temporal)   Resp 16   Ht 5' 10\" (1.778 m)   Wt 191 lb 6.4 oz (86.8 kg)   SpO2 95%   BMI 27.46 kg/m²     Physical Exam  Vitals and nursing note reviewed. Constitutional:       Appearance: He is well-developed. HENT:      Head: Normocephalic and atraumatic. Eyes:      Conjunctiva/sclera: Conjunctivae normal.   Cardiovascular:      Rate and Rhythm: Normal rate and regular rhythm. Heart sounds: Normal heart sounds. Pulmonary:      Effort: Pulmonary effort is normal. No respiratory distress. Breath sounds: Normal breath sounds. Abdominal:      General: Bowel sounds are normal.      Palpations: Abdomen is soft. Tenderness: There is no abdominal tenderness. Musculoskeletal:      Cervical back: Normal range of motion and neck supple. Skin:     General: Skin is warm and dry. Neurological:      Mental Status: He is alert and oriented to person, place, and time. Motor: No abnormal muscle tone. Assessment/Plan:     Kemal Silva was seen today for diabetes. Diagnoses and all orders for this visit:    Type 2 diabetes mellitus without complication, with long-term current use of insulin ([facility])        -     HbA1c was good at 6.5% so will continue on current medication, a healthy diet, and routine physical activity. Essential hypertension        -     BP has been controlled so will continue on current medication. Gastroesophageal reflux disease, unspecified whether esophagitis present        -    Symptoms have improve with eliminating coffee and taking his current medication. Screening for colon cancer        -     He was encouraged to contact GI regarding f/u colonoscopy. Return in about 6 months (around 2/10/2022) for Medicare AWV, diabetes, hypertension.     Electronically signed by Aleksandr Gilbert MD on 8/10/2021 at 1:27 PM

## 2021-08-10 ENCOUNTER — OFFICE VISIT (OUTPATIENT)
Dept: FAMILY MEDICINE CLINIC | Age: 71
End: 2021-08-10
Payer: MEDICARE

## 2021-08-10 VITALS
HEART RATE: 62 BPM | TEMPERATURE: 96.9 F | DIASTOLIC BLOOD PRESSURE: 58 MMHG | SYSTOLIC BLOOD PRESSURE: 110 MMHG | OXYGEN SATURATION: 95 % | BODY MASS INDEX: 27.4 KG/M2 | WEIGHT: 191.4 LBS | HEIGHT: 70 IN | RESPIRATION RATE: 16 BRPM

## 2021-08-10 DIAGNOSIS — Z79.4 TYPE 2 DIABETES MELLITUS WITHOUT COMPLICATION, WITH LONG-TERM CURRENT USE OF INSULIN (HCC): Primary | ICD-10-CM

## 2021-08-10 DIAGNOSIS — K21.9 GASTROESOPHAGEAL REFLUX DISEASE, UNSPECIFIED WHETHER ESOPHAGITIS PRESENT: ICD-10-CM

## 2021-08-10 DIAGNOSIS — E11.9 TYPE 2 DIABETES MELLITUS WITHOUT COMPLICATION, WITH LONG-TERM CURRENT USE OF INSULIN (HCC): Primary | ICD-10-CM

## 2021-08-10 DIAGNOSIS — Z12.11 SCREENING FOR COLON CANCER: ICD-10-CM

## 2021-08-10 DIAGNOSIS — I10 ESSENTIAL HYPERTENSION: ICD-10-CM

## 2021-08-10 PROCEDURE — 2022F DILAT RTA XM EVC RTNOPTHY: CPT | Performed by: FAMILY MEDICINE

## 2021-08-10 PROCEDURE — 3044F HG A1C LEVEL LT 7.0%: CPT | Performed by: FAMILY MEDICINE

## 2021-08-10 PROCEDURE — G8417 CALC BMI ABV UP PARAM F/U: HCPCS | Performed by: FAMILY MEDICINE

## 2021-08-10 PROCEDURE — 1036F TOBACCO NON-USER: CPT | Performed by: FAMILY MEDICINE

## 2021-08-10 PROCEDURE — 1123F ACP DISCUSS/DSCN MKR DOCD: CPT | Performed by: FAMILY MEDICINE

## 2021-08-10 PROCEDURE — G8427 DOCREV CUR MEDS BY ELIG CLIN: HCPCS | Performed by: FAMILY MEDICINE

## 2021-08-10 PROCEDURE — 3017F COLORECTAL CA SCREEN DOC REV: CPT | Performed by: FAMILY MEDICINE

## 2021-08-10 PROCEDURE — 4040F PNEUMOC VAC/ADMIN/RCVD: CPT | Performed by: FAMILY MEDICINE

## 2021-08-10 PROCEDURE — 99214 OFFICE O/P EST MOD 30 MIN: CPT | Performed by: FAMILY MEDICINE

## 2021-08-10 SDOH — ECONOMIC STABILITY: FOOD INSECURITY: WITHIN THE PAST 12 MONTHS, THE FOOD YOU BOUGHT JUST DIDN'T LAST AND YOU DIDN'T HAVE MONEY TO GET MORE.: NEVER TRUE

## 2021-08-10 SDOH — ECONOMIC STABILITY: FOOD INSECURITY: WITHIN THE PAST 12 MONTHS, YOU WORRIED THAT YOUR FOOD WOULD RUN OUT BEFORE YOU GOT MONEY TO BUY MORE.: NEVER TRUE

## 2021-08-10 ASSESSMENT — SOCIAL DETERMINANTS OF HEALTH (SDOH): HOW HARD IS IT FOR YOU TO PAY FOR THE VERY BASICS LIKE FOOD, HOUSING, MEDICAL CARE, AND HEATING?: NOT HARD AT ALL

## 2021-08-24 ENCOUNTER — OFFICE VISIT (OUTPATIENT)
Dept: FAMILY MEDICINE CLINIC | Age: 71
End: 2021-08-24
Payer: MEDICARE

## 2021-08-24 VITALS
OXYGEN SATURATION: 94 % | DIASTOLIC BLOOD PRESSURE: 60 MMHG | HEART RATE: 68 BPM | TEMPERATURE: 97.2 F | RESPIRATION RATE: 18 BRPM | SYSTOLIC BLOOD PRESSURE: 120 MMHG | WEIGHT: 195 LBS | BODY MASS INDEX: 27.92 KG/M2 | HEIGHT: 70 IN

## 2021-08-24 DIAGNOSIS — B96.89 ACUTE BACTERIAL SINUSITIS: Primary | ICD-10-CM

## 2021-08-24 DIAGNOSIS — J01.90 ACUTE BACTERIAL SINUSITIS: Primary | ICD-10-CM

## 2021-08-24 PROCEDURE — 1036F TOBACCO NON-USER: CPT | Performed by: FAMILY MEDICINE

## 2021-08-24 PROCEDURE — 1123F ACP DISCUSS/DSCN MKR DOCD: CPT | Performed by: FAMILY MEDICINE

## 2021-08-24 PROCEDURE — G8417 CALC BMI ABV UP PARAM F/U: HCPCS | Performed by: FAMILY MEDICINE

## 2021-08-24 PROCEDURE — 3017F COLORECTAL CA SCREEN DOC REV: CPT | Performed by: FAMILY MEDICINE

## 2021-08-24 PROCEDURE — 99213 OFFICE O/P EST LOW 20 MIN: CPT | Performed by: FAMILY MEDICINE

## 2021-08-24 PROCEDURE — 4040F PNEUMOC VAC/ADMIN/RCVD: CPT | Performed by: FAMILY MEDICINE

## 2021-08-24 PROCEDURE — G8427 DOCREV CUR MEDS BY ELIG CLIN: HCPCS | Performed by: FAMILY MEDICINE

## 2021-08-24 RX ORDER — AMOXICILLIN AND CLAVULANATE POTASSIUM 875; 125 MG/1; MG/1
1 TABLET, FILM COATED ORAL 2 TIMES DAILY
Qty: 20 TABLET | Refills: 0 | Status: SHIPPED | OUTPATIENT
Start: 2021-08-24 | End: 2021-09-03

## 2021-08-24 ASSESSMENT — ENCOUNTER SYMPTOMS
SORE THROAT: 0
NAUSEA: 0
VOMITING: 0
COUGH: 1
WHEEZING: 0
SHORTNESS OF BREATH: 0

## 2021-08-24 NOTE — PROGRESS NOTES
[address]  [address] [place] [address]  Phone:  [phone]        Name: Carlos Wyatt  : 1950    Chief Complaint   Patient presents with    Cough     x7-8 days, clear and sometimes yellow phelgm    Congestion       HPI:     Carlos Wyatt is a 80 y.o. male who presents today for evaluation of a cough and congestion for the past week. Cough is productive of yellow phlegm. No SOB or wheezing. He is a former smoker (smoked 1.25 PPD for 20 years but quit in ). He has sinus pressure. No loss of taste or smell. He's been vaccinated against COVID and had it last year.       Current Outpatient Medications:     amoxicillin-clavulanate (AUGMENTIN) 875-125 MG per tablet, Take 1 tablet by mouth 2 times daily for 10 days, Disp: 20 tablet, Rfl: 0    BiPAP Machine MISC, by Does not apply route, Disp: , Rfl:     glimepiride (AMARYL) 4 MG tablet, TAKE 1 TABLET BY MOUTH IN THE MORNING AND 1/2 TABLET IN THE EVENING WITH SUPPER, Disp: 135 tablet, Rfl: 0    famotidine (PEPCID) 20 MG tablet, Take 1 tablet by mouth 2 times daily, Disp: 60 tablet, Rfl: 5    vitamin D3 (CHOLECALCIFEROL) 10 MCG (400 UNIT) TABS tablet, Take 400 Units by mouth 2 times daily, Disp: , Rfl:     carvedilol (COREG) 25 MG tablet, Take 1 tablet by mouth twice daily, Disp: 180 tablet, Rfl: 3    doxazosin (CARDURA) 4 MG tablet, Take 1 tablet by mouth nightly, Disp: 90 tablet, Rfl: 3    candesartan (ATACAND) 32 MG tablet, Take 1 tablet by mouth once daily for blood pressure, Disp: 90 tablet, Rfl: 3    pravastatin (PRAVACHOL) 20 MG tablet, TAKE 1 TABLET BY MOUTH IN THE EVENING FOR CHOLESTEROL, Disp: 90 tablet, Rfl: 3    magnesium oxide (MAG-OX) 400 (241.3 Mg) MG TABS tablet, TAKE ONE TABLET BY MOUTH  DAILY, Disp: 90 tablet, Rfl: 4    sildenafil (REVATIO) 20 MG tablet, TAKE TWO TABLETS BY MOUTH AS NEEDED ONE  HOUR  PRIOR  TO  INTERCOURSE, Disp: 30 tablet, Rfl: 7    metFORMIN (GLUCOPHAGE) 500 MG tablet, TAKE 1 TABLET BY MOUTH ONCE DAILY IN THE EVENING ALONG WITH  1000  MG TABLET FOR A TOTAL OF 1500 MG, Disp: 90 tablet, Rfl: 3    metFORMIN (GLUCOPHAGE) 1000 MG tablet, TAKE 1 TABLET BY MOUTH TWICE DAILY WITH MEALS (Patient taking differently: Take 1,000 mg by mouth 2 times daily (with meals) ), Disp: 180 tablet, Rfl: 3    albuterol sulfate HFA (VENTOLIN HFA) 108 (90 Base) MCG/ACT inhaler, Inhale 2 puffs into the lungs every 4 hours as needed for Wheezing or Shortness of Breath, Disp: 1 Inhaler, Rfl: 0    Spacer/Aero-Holding Chambers (E-Z SPACER) SARA, Use with albuterol inhaler, Disp: 1 Device, Rfl: 0    ibuprofen (ADVIL;MOTRIN) 200 MG tablet, Take 400 mg by mouth every 6 hours as needed for Pain, Disp: , Rfl:     diclofenac sodium 1 % GEL, Apply 2 g topically 2 times daily To painful joints of hands, Disp: 1 Tube, Rfl: 3    Dulaglutide (TRULICITY) 1.5 MY/2.6VG SOPN, Inject into the skin, Disp: , Rfl:     ONE TOUCH ULTRA TEST STRP,   , Disp: , Rfl:     Cinnamon 500 MG TABS, Take 1,000 mg by mouth daily , Disp: , Rfl:     aspirin 81 MG tablet, Take 81 mg by mouth daily, Disp: , Rfl:     hydroCHLOROthiazide (MICROZIDE) 12.5 MG capsule, Take 1 capsule by mouth once daily, Disp: 90 capsule, Rfl: 0    mometasone (NASONEX) 50 MCG/ACT nasal spray, 2 sprays by Each Nostril route daily (Patient not taking: Reported on 8/24/2021), Disp: 1 Inhaler, Rfl: 5    Multiple Vitamins-Minerals (THERAPEUTIC MULTIVITAMIN-MINERALS) tablet, Take 1 tablet by mouth daily, Disp: , Rfl:     Allergies   Allergen Reactions    Crestor [Rosuvastatin]      Muscle aches    Lipitor [Atorvastatin]      Back ache    Amlodipine Swelling     Leg swelling       Subjective:      Review of Systems   Constitutional: Negative for chills and fever. HENT: Positive for congestion. Negative for sore throat. Respiratory: Positive for cough. Negative for shortness of breath and wheezing. Gastrointestinal: Negative for nausea and vomiting. Neurological: Negative for headaches. Objective:     /60 (Site: Left Upper Arm, Position: Sitting, Cuff Size: Medium Adult)   Pulse 68   Temp 97.2 °F (36.2 °C) (Temporal)   Resp 18   Ht 5' 10\" (1.778 m)   Wt 195 lb (88.5 kg)   SpO2 94%   BMI 27.98 kg/m²     Physical Exam  Vitals and nursing note reviewed. Constitutional:       Appearance: He is well-developed. HENT:      Head: Normocephalic and atraumatic. Eyes:      Conjunctiva/sclera: Conjunctivae normal.   Cardiovascular:      Rate and Rhythm: Normal rate and regular rhythm. Heart sounds: Normal heart sounds. Pulmonary:      Effort: Pulmonary effort is normal. No respiratory distress. Breath sounds: Normal breath sounds. Abdominal:      General: Bowel sounds are normal.      Palpations: Abdomen is soft. Tenderness: There is no abdominal tenderness. Musculoskeletal:      Cervical back: Normal range of motion and neck supple. Skin:     General: Skin is warm and dry. Neurological:      Mental Status: He is alert and oriented to person, place, and time. Motor: No abnormal muscle tone. Assessment/Plan:     Sherald Saint was seen today for cough and congestion. Diagnoses and all orders for this visit:    Acute bacterial sinusitis  -    Will treat as sinusitis with antibiotics, rest, increased hydration, and OTC symptomatic medication. -    amoxicillin-clavulanate (AUGMENTIN) 875-125 MG per tablet; Take 1 tablet by mouth 2 times daily for 10 days      Return if symptoms worsen or fail to improve.     Electronically signed by Natalia Rollins MD on 8/24/2021 at 3:34 PM

## 2021-08-24 NOTE — PATIENT INSTRUCTIONS
Patient Education        Sinusitis: Care Instructions  Your Care Instructions     Sinusitis is an infection of the lining of the sinus cavities in your head. Sinusitis often follows a cold. It causes pain and pressure in your head and face. In most cases, sinusitis gets better on its own in 1 to 2 weeks. But some mild symptoms may last for several weeks. Sometimes antibiotics are needed. Follow-up care is a key part of your treatment and safety. Be sure to make and go to all appointments, and call your doctor if you are having problems. It's also a good idea to know your test results and keep a list of the medicines you take. How can you care for yourself at home? · Take an over-the-counter pain medicine, such as acetaminophen (Tylenol), ibuprofen (Advil, Motrin), or naproxen (Aleve). Read and follow all instructions on the label. · If the doctor prescribed antibiotics, take them as directed. Do not stop taking them just because you feel better. You need to take the full course of antibiotics. · Be careful when taking over-the-counter cold or flu medicines and Tylenol at the same time. Many of these medicines have acetaminophen, which is Tylenol. Read the labels to make sure that you are not taking more than the recommended dose. Too much acetaminophen (Tylenol) can be harmful. · Breathe warm, moist air from a steamy shower, a hot bath, or a sink filled with hot water. Avoid cold, dry air. Using a humidifier in your home may help. Follow the directions for cleaning the machine. · Use saline (saltwater) nasal washes. This can help keep your nasal passages open and wash out mucus and bacteria. You can buy saline nose drops at a grocery store or drugstore. Or you can make your own at home by adding 1 teaspoon of salt and 1 teaspoon of baking soda to 2 cups of distilled water. If you make your own, fill a bulb syringe with the solution, insert the tip into your nostril, and squeeze gently. Izora Booty your nose.   · Put a hot, wet towel or a warm gel pack on your face 3 or 4 times a day for 5 to 10 minutes each time. · Try a decongestant nasal spray like oxymetazoline (Afrin). Do not use it for more than 3 days in a row. Using it for more than 3 days can make your congestion worse. When should you call for help? Call your doctor now or seek immediate medical care if:    · You have new or worse swelling or redness in your face or around your eyes.     · You have a new or higher fever. Watch closely for changes in your health, and be sure to contact your doctor if:    · You have new or worse facial pain.     · The mucus from your nose becomes thicker (like pus) or has new blood in it.     · You are not getting better as expected. Where can you learn more? Go to https://[employer].Txt4. org and sign in to your [employer] account. Enter R656 in the farmbuy box to learn more about \"Sinusitis: Care Instructions. \"     If you do not have an account, please click on the \"Sign Up Now\" link. Current as of: December 2, 2020               Content Version: 12.9  © 6797-0945 [employer]. Care instructions adapted under license by [facility] ([facility]). If you have questions about a medical condition or this instruction, always ask your healthcare professional. [address]  any warranty or liability for your use of this information.

## 2021-09-08 ASSESSMENT — ENCOUNTER SYMPTOMS
VOMITING: 0
SORE THROAT: 0
TROUBLE SWALLOWING: 0
NAUSEA: 0

## 2021-09-08 NOTE — PROGRESS NOTES
[address]  [address] [place] [address]  Phone:  [phone]          Name: Jalil Vigil  : 1950    Chief Complaint   Patient presents with    Mass     rt side jaw line x1 week, slight pain with touch       HPI:     Jalil Vigil is a 80 y.o. male who presents today for evaluation of a lump on his right jaw. He first noticed it last week and it's tender to touch. No sore throat, difficulty swallowing, dental pain, congestion, cough, fevers, etc.  He has never had something similar. He denies being hit in that area. He's looking forward to going to [facility] so wants this gone.       Current Outpatient Medications:     doxycycline hyclate (VIBRA-TABS) 100 MG tablet, Take 1 tablet by mouth 2 times daily for 10 days, Disp: 20 tablet, Rfl: 0    metFORMIN (GLUCOPHAGE) 1000 MG tablet, TAKE 1 TABLET BY MOUTH TWICE DAILY WITH MEALS, Disp: 180 tablet, Rfl: 0    metFORMIN (GLUCOPHAGE) 500 MG tablet, TAKE 1 TABLET BY MOUTH ONCE DAILY IN THE EVENING ALONG  WITH  1000  MG  TABLET  FOR  A  TOTAL  OF  1500  MG, Disp: 90 tablet, Rfl: 0    BiPAP Machine MISC, by Does not apply route, Disp: , Rfl:     glimepiride (AMARYL) 4 MG tablet, TAKE 1 TABLET BY MOUTH IN THE MORNING AND 1/2 TABLET IN THE EVENING WITH SUPPER, Disp: 135 tablet, Rfl: 0    hydroCHLOROthiazide (MICROZIDE) 12.5 MG capsule, Take 1 capsule by mouth once daily, Disp: 90 capsule, Rfl: 0    famotidine (PEPCID) 20 MG tablet, Take 1 tablet by mouth 2 times daily, Disp: 60 tablet, Rfl: 5    vitamin D3 (CHOLECALCIFEROL) 10 MCG (400 UNIT) TABS tablet, Take 400 Units by mouth 2 times daily, Disp: , Rfl:     carvedilol (COREG) 25 MG tablet, Take 1 tablet by mouth twice daily, Disp: 180 tablet, Rfl: 3    doxazosin (CARDURA) 4 MG tablet, Take 1 tablet by mouth nightly, Disp: 90 tablet, Rfl: 3    candesartan (ATACAND) 32 MG tablet, Take 1 tablet by mouth once daily for blood pressure, Disp: 90 tablet, Rfl: 3   pravastatin (PRAVACHOL) 20 MG tablet, TAKE 1 TABLET BY MOUTH IN THE EVENING FOR CHOLESTEROL, Disp: 90 tablet, Rfl: 3    magnesium oxide (MAG-OX) 400 (241.3 Mg) MG TABS tablet, TAKE ONE TABLET BY MOUTH  DAILY, Disp: 90 tablet, Rfl: 4    sildenafil (REVATIO) 20 MG tablet, TAKE TWO TABLETS BY MOUTH AS NEEDED ONE  HOUR  PRIOR  TO  INTERCOURSE, Disp: 30 tablet, Rfl: 7    albuterol sulfate HFA (VENTOLIN HFA) 108 (90 Base) MCG/ACT inhaler, Inhale 2 puffs into the lungs every 4 hours as needed for Wheezing or Shortness of Breath, Disp: 1 Inhaler, Rfl: 0    Spacer/Aero-Holding Chambers (E-Z SPACER) SARA, Use with albuterol inhaler, Disp: 1 Device, Rfl: 0    ibuprofen (ADVIL;MOTRIN) 200 MG tablet, Take 400 mg by mouth every 6 hours as needed for Pain, Disp: , Rfl:     diclofenac sodium 1 % GEL, Apply 2 g topically 2 times daily To painful joints of hands, Disp: 1 Tube, Rfl: 3    Dulaglutide (TRULICITY) 1.5 DH/9.5GN SOPN, Inject into the skin, Disp: , Rfl:     Multiple Vitamins-Minerals (THERAPEUTIC MULTIVITAMIN-MINERALS) tablet, Take 1 tablet by mouth daily, Disp: , Rfl:     ONE TOUCH ULTRA TEST STRP,   , Disp: , Rfl:     Cinnamon 500 MG TABS, Take 1,000 mg by mouth daily , Disp: , Rfl:     aspirin 81 MG tablet, Take 81 mg by mouth daily, Disp: , Rfl:     mometasone (NASONEX) 50 MCG/ACT nasal spray, 2 sprays by Each Nostril route daily (Patient not taking: Reported on 8/24/2021), Disp: 1 Inhaler, Rfl: 5    Allergies   Allergen Reactions    Crestor [Rosuvastatin]      Muscle aches    Lipitor [Atorvastatin]      Back ache    Amlodipine Swelling     Leg swelling       Subjective:      Review of Systems   Constitutional: Negative for chills and fever. HENT: Negative for sore throat and trouble swallowing. Gastrointestinal: Negative for nausea and vomiting.        Objective:     /76 (Site: Left Upper Arm, Position: Sitting, Cuff Size: Medium Adult)   Pulse 79   Temp 96.4 °F (35.8 °C) (Temporal)   Resp 16   Ht 5' 10\" (1.778 m)   Wt 201 lb (91.2 kg)   SpO2 98%   BMI 28.84 kg/m²     Physical Exam  Vitals and nursing note reviewed. Constitutional:       Appearance: He is well-developed. HENT:      Head: Normocephalic and atraumatic. Eyes:      Conjunctiva/sclera: Conjunctivae normal.   Cardiovascular:      Rate and Rhythm: Normal rate and regular rhythm. Heart sounds: Normal heart sounds. Pulmonary:      Effort: Pulmonary effort is normal. No respiratory distress. Breath sounds: Normal breath sounds. Abdominal:      General: Bowel sounds are normal.      Palpations: Abdomen is soft. Tenderness: There is no abdominal tenderness. Musculoskeletal:      Cervical back: Normal range of motion and neck supple. Skin:     General: Skin is warm and dry. Comments: 0.5 cm round mobile lump under right mandible. Neurological:      Mental Status: He is alert and oriented to person, place, and time. Motor: No abnormal muscle tone. Assessment/Plan:     Kemal Hoople was seen today for mass. Diagnoses and all orders for this visit:    Abscess of right jaw  -     The small lump is likely an abscess, cyst, or lymph node. Will treat with antibiotics and if symptoms do not improve, will consider US for further evaluation.   -     doxycycline hyclate (VIBRA-TABS) 100 MG tablet; Take 1 tablet by mouth 2 times daily for 10 days      Return if symptoms worsen or fail to improve.     Electronically signed by Aleksandr Gilbert MD on 9/9/2021 at 11:07 AM

## 2021-09-09 ENCOUNTER — OFFICE VISIT (OUTPATIENT)
Dept: FAMILY MEDICINE CLINIC | Age: 71
End: 2021-09-09
Payer: MEDICARE

## 2021-09-09 VITALS
SYSTOLIC BLOOD PRESSURE: 134 MMHG | HEART RATE: 79 BPM | RESPIRATION RATE: 16 BRPM | WEIGHT: 201 LBS | OXYGEN SATURATION: 98 % | TEMPERATURE: 96.4 F | BODY MASS INDEX: 28.77 KG/M2 | DIASTOLIC BLOOD PRESSURE: 76 MMHG | HEIGHT: 70 IN

## 2021-09-09 DIAGNOSIS — M27.2 ABSCESS OF RIGHT JAW: Primary | ICD-10-CM

## 2021-09-09 PROCEDURE — G8417 CALC BMI ABV UP PARAM F/U: HCPCS | Performed by: FAMILY MEDICINE

## 2021-09-09 PROCEDURE — 1123F ACP DISCUSS/DSCN MKR DOCD: CPT | Performed by: FAMILY MEDICINE

## 2021-09-09 PROCEDURE — 3017F COLORECTAL CA SCREEN DOC REV: CPT | Performed by: FAMILY MEDICINE

## 2021-09-09 PROCEDURE — 4040F PNEUMOC VAC/ADMIN/RCVD: CPT | Performed by: FAMILY MEDICINE

## 2021-09-09 PROCEDURE — 99213 OFFICE O/P EST LOW 20 MIN: CPT | Performed by: FAMILY MEDICINE

## 2021-09-09 PROCEDURE — 1036F TOBACCO NON-USER: CPT | Performed by: FAMILY MEDICINE

## 2021-09-09 PROCEDURE — G8427 DOCREV CUR MEDS BY ELIG CLIN: HCPCS | Performed by: FAMILY MEDICINE

## 2021-09-09 RX ORDER — DOXYCYCLINE HYCLATE 100 MG
100 TABLET ORAL 2 TIMES DAILY
Qty: 20 TABLET | Refills: 0 | Status: SHIPPED | OUTPATIENT
Start: 2021-09-09 | End: 2021-09-19

## 2021-09-25 DIAGNOSIS — E83.42 HYPOMAGNESEMIA: ICD-10-CM

## 2021-09-25 DIAGNOSIS — I10 ESSENTIAL HYPERTENSION: ICD-10-CM

## 2021-09-27 RX ORDER — HYDROCHLOROTHIAZIDE 12.5 MG/1
CAPSULE, GELATIN COATED ORAL
Qty: 90 CAPSULE | Refills: 0 | Status: SHIPPED | OUTPATIENT
Start: 2021-09-27 | End: 2021-12-16

## 2021-09-27 RX ORDER — GLIMEPIRIDE 4 MG/1
TABLET ORAL
Qty: 135 TABLET | Refills: 0 | Status: SHIPPED | OUTPATIENT
Start: 2021-09-27 | End: 2021-12-16

## 2022-01-04 DIAGNOSIS — E83.42 HYPOMAGNESEMIA: ICD-10-CM

## 2022-01-04 RX ORDER — GLIMEPIRIDE 4 MG/1
TABLET ORAL
Qty: 135 TABLET | Refills: 1 | Status: SHIPPED | OUTPATIENT
Start: 2022-01-04 | End: 2022-06-09

## 2022-01-05 ENCOUNTER — OFFICE VISIT (OUTPATIENT)
Dept: FAMILY MEDICINE CLINIC | Age: 72
End: 2022-01-05
Payer: MEDICARE

## 2022-01-05 VITALS
WEIGHT: 197 LBS | SYSTOLIC BLOOD PRESSURE: 130 MMHG | OXYGEN SATURATION: 99 % | RESPIRATION RATE: 16 BRPM | DIASTOLIC BLOOD PRESSURE: 70 MMHG | BODY MASS INDEX: 28.27 KG/M2 | TEMPERATURE: 96.7 F | HEART RATE: 79 BPM

## 2022-01-05 DIAGNOSIS — R09.89 CHEST CONGESTION: Primary | ICD-10-CM

## 2022-01-05 DIAGNOSIS — E11.65 TYPE 2 DIABETES MELLITUS WITH HYPERGLYCEMIA, WITH LONG-TERM CURRENT USE OF INSULIN (HCC): ICD-10-CM

## 2022-01-05 DIAGNOSIS — E21.3 HYPERPARATHYROIDISM (HCC): ICD-10-CM

## 2022-01-05 DIAGNOSIS — J18.9 PNEUMONIA OF RIGHT LOWER LOBE DUE TO INFECTIOUS ORGANISM: ICD-10-CM

## 2022-01-05 DIAGNOSIS — Z79.4 TYPE 2 DIABETES MELLITUS WITH HYPERGLYCEMIA, WITH LONG-TERM CURRENT USE OF INSULIN (HCC): ICD-10-CM

## 2022-01-05 LAB
Lab: NORMAL
QC PASS/FAIL: NORMAL
SARS-COV-2 RDRP RESP QL NAA+PROBE: NEGATIVE

## 2022-01-05 PROCEDURE — 1036F TOBACCO NON-USER: CPT | Performed by: NURSE PRACTITIONER

## 2022-01-05 PROCEDURE — 4040F PNEUMOC VAC/ADMIN/RCVD: CPT | Performed by: NURSE PRACTITIONER

## 2022-01-05 PROCEDURE — 3046F HEMOGLOBIN A1C LEVEL >9.0%: CPT | Performed by: NURSE PRACTITIONER

## 2022-01-05 PROCEDURE — 99213 OFFICE O/P EST LOW 20 MIN: CPT | Performed by: NURSE PRACTITIONER

## 2022-01-05 PROCEDURE — G8427 DOCREV CUR MEDS BY ELIG CLIN: HCPCS | Performed by: NURSE PRACTITIONER

## 2022-01-05 PROCEDURE — 87635 SARS-COV-2 COVID-19 AMP PRB: CPT | Performed by: NURSE PRACTITIONER

## 2022-01-05 PROCEDURE — G8417 CALC BMI ABV UP PARAM F/U: HCPCS | Performed by: NURSE PRACTITIONER

## 2022-01-05 PROCEDURE — 2022F DILAT RTA XM EVC RTNOPTHY: CPT | Performed by: NURSE PRACTITIONER

## 2022-01-05 PROCEDURE — 1123F ACP DISCUSS/DSCN MKR DOCD: CPT | Performed by: NURSE PRACTITIONER

## 2022-01-05 PROCEDURE — 96372 THER/PROPH/DIAG INJ SC/IM: CPT | Performed by: NURSE PRACTITIONER

## 2022-01-05 PROCEDURE — 3017F COLORECTAL CA SCREEN DOC REV: CPT | Performed by: NURSE PRACTITIONER

## 2022-01-05 PROCEDURE — G8482 FLU IMMUNIZE ORDER/ADMIN: HCPCS | Performed by: NURSE PRACTITIONER

## 2022-01-05 RX ORDER — ONDANSETRON 4 MG/1
4 TABLET, FILM COATED ORAL 3 TIMES DAILY PRN
Qty: 15 TABLET | Refills: 0 | Status: SHIPPED | OUTPATIENT
Start: 2022-01-05 | End: 2022-04-18 | Stop reason: ALTCHOICE

## 2022-01-05 RX ORDER — AZITHROMYCIN 250 MG/1
TABLET, FILM COATED ORAL
Qty: 1 PACKET | Refills: 0 | Status: SHIPPED | OUTPATIENT
Start: 2022-01-05 | End: 2022-04-18 | Stop reason: ALTCHOICE

## 2022-01-05 RX ORDER — CEFTRIAXONE 1 G/1
1000 INJECTION, POWDER, FOR SOLUTION INTRAMUSCULAR; INTRAVENOUS ONCE
Status: COMPLETED | OUTPATIENT
Start: 2022-01-05 | End: 2022-01-05

## 2022-01-05 RX ADMIN — CEFTRIAXONE 1000 MG: 1 INJECTION, POWDER, FOR SOLUTION INTRAMUSCULAR; INTRAVENOUS at 17:01

## 2022-01-05 ASSESSMENT — ENCOUNTER SYMPTOMS
GASTROINTESTINAL NEGATIVE: 1
EYES NEGATIVE: 1
COUGH: 1

## 2022-01-05 NOTE — PROGRESS NOTES
Administrations This Visit     cefTRIAXone (ROCEPHIN) injection 1,000 mg     Admin Date  01/05/2022  17:01 Action  Given Dose  1,000 mg Route  IntraMUSCular Site  Ventrogluteal Right Administered By  Elsa Saucedo LPN    Ordering Provider: OMID Eng CNP    NDC: 2262-5697-62    Lot#: [id number]    : [place]    Patient Supplied?: No                Patient instructed to report any adverse reaction to me immediately.

## 2022-01-05 NOTE — PROGRESS NOTES
Medications   Medication Sig Dispense Refill    azithromycin (ZITHROMAX) 250 MG tablet Take 2 tabs (500 mg) on Day 1, and take 1 tab (250 mg) on days 2 through 5. 1 packet 0    ondansetron (ZOFRAN) 4 MG tablet Take 1 tablet by mouth 3 times daily as needed for Nausea or Vomiting 15 tablet 0    glimepiride (AMARYL) 4 MG tablet TAKE 1 TABLET BY MOUTH IN THE MORNING AND 1/2 TABLET IN THE EVENING WITH SUPPER 135 tablet 1    magnesium oxide (MAG-OX) 400 (241.3 Mg) MG TABS tablet Take 1 tablet by mouth once daily 90 tablet 1    pravastatin (PRAVACHOL) 20 MG tablet TAKE 1 TABLET BY MOUTH IN THE EVENING FOR CHOLESTEROL 90 tablet 1    hydroCHLOROthiazide (MICROZIDE) 12.5 MG capsule Take 1 capsule by mouth once daily 90 capsule 1    metFORMIN (GLUCOPHAGE) 500 MG tablet TAKE 1 TABLET BY MOUTH ONCE DAILY IN THE EVENING ALONG WITH 1000 MG TABLET FOR A TOTAL OF 1500 MG 90 tablet 0    metFORMIN (GLUCOPHAGE) 1000 MG tablet TAKE 1 TABLET BY MOUTH TWICE DAILY WITH MEALS 180 tablet 0    famotidine (PEPCID) 20 MG tablet Take 1 tablet by mouth twice daily 60 tablet 5    BiPAP Machine MISC by Does not apply route      vitamin D3 (CHOLECALCIFEROL) 10 MCG (400 UNIT) TABS tablet Take 400 Units by mouth 2 times daily      carvedilol (COREG) 25 MG tablet Take 1 tablet by mouth twice daily 180 tablet 3    doxazosin (CARDURA) 4 MG tablet Take 1 tablet by mouth nightly 90 tablet 3    candesartan (ATACAND) 32 MG tablet Take 1 tablet by mouth once daily for blood pressure 90 tablet 3    mometasone (NASONEX) 50 MCG/ACT nasal spray 2 sprays by Each Nostril route daily 1 Inhaler 5    sildenafil (REVATIO) 20 MG tablet TAKE TWO TABLETS BY MOUTH AS NEEDED ONE  HOUR  PRIOR  TO  INTERCOURSE 30 tablet 7    albuterol sulfate HFA (VENTOLIN HFA) 108 (90 Base) MCG/ACT inhaler Inhale 2 puffs into the lungs every 4 hours as needed for Wheezing or Shortness of Breath 1 Inhaler 0    Spacer/Aero-Holding Chambers (E-Z SPACER) SARA Use with albuterol inhaler 1 Device 0    ibuprofen (ADVIL;MOTRIN) 200 MG tablet Take 400 mg by mouth every 6 hours as needed for Pain      diclofenac sodium 1 % GEL Apply 2 g topically 2 times daily To painful joints of hands 1 Tube 3    Dulaglutide (TRULICITY) 1.5 KD/3.7PL SOPN Inject into the skin      Multiple Vitamins-Minerals (THERAPEUTIC MULTIVITAMIN-MINERALS) tablet Take 1 tablet by mouth daily      ONE TOUCH ULTRA TEST STRP       Cinnamon 500 MG TABS Take 1,000 mg by mouth daily       aspirin 81 MG tablet Take 81 mg by mouth daily       No current facility-administered medications for this visit. Allergies   Allergen Reactions    Crestor [Rosuvastatin]      Muscle aches    Lipitor [Atorvastatin]      Back ache    Amlodipine Swelling     Leg swelling     Health Maintenance   Topic Date Due    Shingles Vaccine (1 of 2) Never done    COVID-19 Vaccine (2 - Moderna 3-dose series) 03/25/2021    Colon cancer screen colonoscopy  07/26/2021    Diabetic microalbuminuria test  11/10/2021    Annual Wellness Visit (AWV)  11/11/2021    Lipid screen  11/23/2021    Potassium monitoring  02/15/2022    Creatinine monitoring  02/15/2022    Depression Screen  03/02/2022    A1C test (Diabetic or Prediabetic)  03/10/2022    Diabetic retinal exam  03/10/2022    Diabetic foot exam  03/23/2022    DTaP/Tdap/Td vaccine (2 - Td or Tdap) 08/26/2024    Flu vaccine  Completed    Pneumococcal 65+ years Vaccine  Completed    AAA screen  Completed    Hepatitis C screen  Completed    Hepatitis A vaccine  Aged Out    Hib vaccine  Aged Out    Meningococcal (ACWY) vaccine  Aged Out       Subjective:     Review of Systems   Constitutional: Positive for chills. HENT: Positive for congestion. Eyes: Negative. Respiratory: Positive for cough. Cardiovascular: Negative. Gastrointestinal: Negative. Musculoskeletal: Negative. Skin: Negative. Neurological: Positive for headaches.        Objective:     Vitals: 01/05/22 1620 01/05/22 1650   BP: (!) 162/82 130/70   Site: Left Upper Arm    Position: Sitting    Cuff Size: Large Adult    Pulse: 79    Resp: 16    Temp: 96.7 °F (35.9 °C)    TempSrc: Temporal    SpO2: 99%    Weight: 197 lb (89.4 kg)        Physical Exam  Constitutional:       Appearance: He is well-developed. He is ill-appearing. HENT:      Head: Normocephalic. Right Ear: Tympanic membrane and external ear normal.      Left Ear: Tympanic membrane and external ear normal.      Nose:      Right Sinus: Maxillary sinus tenderness and frontal sinus tenderness present. Left Sinus: Maxillary sinus tenderness and frontal sinus tenderness present. Cardiovascular:      Rate and Rhythm: Normal rate and regular rhythm. Heart sounds: Normal heart sounds. No murmur heard. No friction rub. No gallop. Pulmonary:      Effort: Pulmonary effort is normal.      Breath sounds: Examination of the right-lower field reveals rhonchi. Rhonchi present. No wheezing or rales. Abdominal:      General: Bowel sounds are normal.      Palpations: Abdomen is soft. Tenderness: There is no abdominal tenderness. There is no guarding. Musculoskeletal:         General: Normal range of motion. Cervical back: Normal range of motion and neck supple. Lymphadenopathy:      Cervical: No cervical adenopathy. Skin:     General: Skin is warm. Neurological:      Mental Status: He is alert and oriented to person, place, and time. Deep Tendon Reflexes: Reflexes are normal and symmetric. Assessment:      Diagnosis Orders   1. Chest congestion  POCT COVID-19, Rapid   2. Pneumonia of right lower lobe due to infectious organism  azithromycin (ZITHROMAX) 250 MG tablet   3. Type 2 diabetes mellitus with hyperglycemia, with long-term current use of insulin ([address]     4. Hyperparathyroidism ([address]         Plan:      No follow-ups on file.        Orders Placed This Encounter   Procedures    POCT COVID-19, Rapid Order Specific Question:   Is this test for diagnosis or screening? Answer:   Diagnosis of ill patient     Order Specific Question:   Symptomatic for COVID-19 as defined by CDC? Answer:   Yes     Order Specific Question:   Date of Symptom Onset     Answer:   12/29/2021     Order Specific Question:   Hospitalized for COVID-19? Answer:   No     Order Specific Question:   Admitted to ICU for COVID-19? Answer:   No     Order Specific Question:   Employed in healthcare setting? Answer:   No     Order Specific Question:   Resident in a congregate (group) care setting? Answer:   No     Order Specific Question:   Pregnant: Answer:   No     Order Specific Question:   Previously tested for COVID-19? Answer:   Yes     Orders Placed This Encounter   Medications    cefTRIAXone (ROCEPHIN) injection 1,000 mg     Order Specific Question:   Antimicrobial Indications     Answer:   Pneumonia (CAP)    azithromycin (ZITHROMAX) 250 MG tablet     Sig: Take 2 tabs (500 mg) on Day 1, and take 1 tab (250 mg) on days 2 through 5. Dispense:  1 packet     Refill:  0    ondansetron (ZOFRAN) 4 MG tablet     Sig: Take 1 tablet by mouth 3 times daily as needed for Nausea or Vomiting     Dispense:  15 tablet     Refill:  0      covid negative  See orders  Ok to try motrin for pain  Spoke to patito in law  Will give update later tonight on how he is doing     Patient given educational materials - seepatient instructions. Discussed use, benefit, and side effects of prescribed medications. All patient questions answered. Pt voiced understanding. Patient agreed withtreatment plan. Follow up as directed.      Electronically signed by OMID Betancourt CNP on 1/5/2022 at 6:27 PM

## 2022-01-13 ENCOUNTER — HOSPITAL ENCOUNTER (OUTPATIENT)
Dept: CT IMAGING | Age: 72
Discharge: HOME | End: 2022-01-13
Payer: MEDICARE

## 2022-01-13 ENCOUNTER — OFFICE VISIT (OUTPATIENT)
Dept: FAMILY MEDICINE CLINIC | Age: 72
End: 2022-01-13
Payer: MEDICARE

## 2022-01-13 VITALS
DIASTOLIC BLOOD PRESSURE: 72 MMHG | RESPIRATION RATE: 16 BRPM | SYSTOLIC BLOOD PRESSURE: 120 MMHG | HEART RATE: 66 BPM | TEMPERATURE: 97.9 F | BODY MASS INDEX: 27.61 KG/M2 | WEIGHT: 192.4 LBS

## 2022-01-13 DIAGNOSIS — R41.82 ALTERED MENTAL STATUS, UNSPECIFIED ALTERED MENTAL STATUS TYPE: ICD-10-CM

## 2022-01-13 DIAGNOSIS — G89.29 CHRONIC NONINTRACTABLE HEADACHE, UNSPECIFIED HEADACHE TYPE: Primary | ICD-10-CM

## 2022-01-13 DIAGNOSIS — R51.9 CHRONIC NONINTRACTABLE HEADACHE, UNSPECIFIED HEADACHE TYPE: ICD-10-CM

## 2022-01-13 DIAGNOSIS — G89.29 CHRONIC NONINTRACTABLE HEADACHE, UNSPECIFIED HEADACHE TYPE: ICD-10-CM

## 2022-01-13 DIAGNOSIS — R51.9 CHRONIC NONINTRACTABLE HEADACHE, UNSPECIFIED HEADACHE TYPE: Primary | ICD-10-CM

## 2022-01-13 PROCEDURE — 70450 CT HEAD/BRAIN W/O DYE: CPT

## 2022-01-13 PROCEDURE — 99214 OFFICE O/P EST MOD 30 MIN: CPT | Performed by: NURSE PRACTITIONER

## 2022-01-13 PROCEDURE — G8427 DOCREV CUR MEDS BY ELIG CLIN: HCPCS | Performed by: NURSE PRACTITIONER

## 2022-01-13 PROCEDURE — 4040F PNEUMOC VAC/ADMIN/RCVD: CPT | Performed by: NURSE PRACTITIONER

## 2022-01-13 PROCEDURE — G8417 CALC BMI ABV UP PARAM F/U: HCPCS | Performed by: NURSE PRACTITIONER

## 2022-01-13 PROCEDURE — 1036F TOBACCO NON-USER: CPT | Performed by: NURSE PRACTITIONER

## 2022-01-13 PROCEDURE — 1123F ACP DISCUSS/DSCN MKR DOCD: CPT | Performed by: NURSE PRACTITIONER

## 2022-01-13 PROCEDURE — G8482 FLU IMMUNIZE ORDER/ADMIN: HCPCS | Performed by: NURSE PRACTITIONER

## 2022-01-13 PROCEDURE — 3017F COLORECTAL CA SCREEN DOC REV: CPT | Performed by: NURSE PRACTITIONER

## 2022-01-13 ASSESSMENT — ENCOUNTER SYMPTOMS
RESPIRATORY NEGATIVE: 1
GASTROINTESTINAL NEGATIVE: 1
EYES NEGATIVE: 1

## 2022-01-13 NOTE — PROGRESS NOTES
Diane Underwood is a 80 y.o. male whopresents today for :  Chief Complaint   Patient presents with    Headache     dull headache off and on.  starts at base of skull    Dizziness     foggy       HPI:     HPI  Pt here for fu of last week. Still having headache. Feels foggy. Feels very tired. feels like focus/vision is off.   \"everything just feels off a little bit\"  Patient Active Problem List   Diagnosis    Hyperlipidemia    Type 2 diabetes mellitus with hyperglycemia, with long-term current use of insulin ([address]    Essential hypertension    Hypercalcemia    Vitamin D insufficiency    GERD (gastroesophageal reflux disease)    Has stopped breathing    Heart murmur    LEW treated with BiPAP    Hyperparathyroidism ([address]    Hypomagnesemia    Normocytic anemia    Iron deficiency anemia    Male erectile dysfunction, unspecified        Past Medical History:   Diagnosis Date    Erectile dysfunction     Hyperlipidemia     Hypertension     LEW treated with BiPAP     Type 2 diabetes mellitus ([address]       Past Surgical History:   Procedure Laterality Date    APPENDECTOMY  as teenager    BACK SURGERY      herniated disc- L5-S1    CATARACT REMOVAL Bilateral     COLONOSCOPY  2016    ENDOSCOPIC ULTRASOUND (UPPER)  2017    EYE SURGERY Right 2005    SKIN CANCER EXCISION Left     left hand     TONSILLECTOMY      as teenager    UPPER GASTROINTESTINAL ENDOSCOPY       Family History   Problem Relation Age of Onset    Diabetes Mother     Cancer Mother         lung    Diabetes Father     Kidney Disease Father     Heart Attack Paternal Grandfather 72     Social History     Tobacco Use    Smoking status: Former Smoker     Packs/day: 1.25     Years: 20.00     Pack years: 25.00     Quit date: 1995     Years since quittin.0    Smokeless tobacco: Former User     Types: Chew     Quit date: 1990   Substance Use Topics    Alcohol use: No     Alcohol/week: 0.0 standard drinks Current Outpatient Medications   Medication Sig Dispense Refill    azithromycin (ZITHROMAX) 250 MG tablet Take 2 tabs (500 mg) on Day 1, and take 1 tab (250 mg) on days 2 through 5. 1 packet 0    ondansetron (ZOFRAN) 4 MG tablet Take 1 tablet by mouth 3 times daily as needed for Nausea or Vomiting 15 tablet 0    glimepiride (AMARYL) 4 MG tablet TAKE 1 TABLET BY MOUTH IN THE MORNING AND 1/2 TABLET IN THE EVENING WITH SUPPER 135 tablet 1    magnesium oxide (MAG-OX) 400 (241.3 Mg) MG TABS tablet Take 1 tablet by mouth once daily 90 tablet 1    pravastatin (PRAVACHOL) 20 MG tablet TAKE 1 TABLET BY MOUTH IN THE EVENING FOR CHOLESTEROL 90 tablet 1    hydroCHLOROthiazide (MICROZIDE) 12.5 MG capsule Take 1 capsule by mouth once daily 90 capsule 1    metFORMIN (GLUCOPHAGE) 500 MG tablet TAKE 1 TABLET BY MOUTH ONCE DAILY IN THE EVENING ALONG WITH 1000 MG TABLET FOR A TOTAL OF 1500 MG 90 tablet 0    metFORMIN (GLUCOPHAGE) 1000 MG tablet TAKE 1 TABLET BY MOUTH TWICE DAILY WITH MEALS 180 tablet 0    famotidine (PEPCID) 20 MG tablet Take 1 tablet by mouth twice daily 60 tablet 5    BiPAP Machine MISC by Does not apply route      vitamin D3 (CHOLECALCIFEROL) 10 MCG (400 UNIT) TABS tablet Take 400 Units by mouth 2 times daily      carvedilol (COREG) 25 MG tablet Take 1 tablet by mouth twice daily 180 tablet 3    doxazosin (CARDURA) 4 MG tablet Take 1 tablet by mouth nightly 90 tablet 3    candesartan (ATACAND) 32 MG tablet Take 1 tablet by mouth once daily for blood pressure 90 tablet 3    mometasone (NASONEX) 50 MCG/ACT nasal spray 2 sprays by Each Nostril route daily 1 Inhaler 5    sildenafil (REVATIO) 20 MG tablet TAKE TWO TABLETS BY MOUTH AS NEEDED ONE  HOUR  PRIOR  TO  INTERCOURSE 30 tablet 7    albuterol sulfate HFA (VENTOLIN HFA) 108 (90 Base) MCG/ACT inhaler Inhale 2 puffs into the lungs every 4 hours as needed for Wheezing or Shortness of Breath 1 Inhaler 0    Spacer/Aero-Holding Chambers (E-Z 1325   BP: 120/72   Site: Left Upper Arm   Position: Sitting   Cuff Size: Medium Adult   Pulse: 66   Resp: 16   Temp: 97.9 °F (36.6 °C)   TempSrc: Temporal   Weight: 192 lb 6.4 oz (87.3 kg)       Physical Exam  Constitutional:       Appearance: He is well-developed. HENT:      Head: Normocephalic. Right Ear: Tympanic membrane and external ear normal.      Left Ear: Tympanic membrane and external ear normal.      Nose: Nose normal.   Cardiovascular:      Rate and Rhythm: Normal rate and regular rhythm. Heart sounds: Normal heart sounds. No murmur heard. No friction rub. No gallop. Pulmonary:      Effort: Pulmonary effort is normal.      Breath sounds: Normal breath sounds. No wheezing or rales. Abdominal:      General: Bowel sounds are normal.      Palpations: Abdomen is soft. Tenderness: There is no abdominal tenderness. There is no guarding. Musculoskeletal:         General: Normal range of motion. Cervical back: Normal range of motion and neck supple. Lymphadenopathy:      Cervical: No cervical adenopathy. Skin:     General: Skin is warm. Neurological:      Mental Status: He is alert and oriented to person, place, and time. Deep Tendon Reflexes: Reflexes are normal and symmetric. Assessment:      Diagnosis Orders   1. Chronic nonintractable headache, unspecified headache type  CT HEAD WO CONTRAST   2. Altered mental status, unspecified altered mental status type  CT HEAD WO CONTRAST       Plan:      No follow-ups on file. Orders Placed This Encounter   Procedures    CT HEAD WO CONTRAST     Standing Status:   Future     Number of Occurrences:   1     Standing Expiration Date:   1/13/2023     No orders of the defined types were placed in this encounter. Ct was ok. No acute changes. Suspect still recovering from infection  Will monitor     Patient given educational materials - seepatient instructions.   Discussed use, benefit, and side effects of prescribed medications. All patient questions answered. Pt voiced understanding. Patient agreed withtreatment plan. Follow up as directed.      Electronically signed by OMID Tirado CNP on 1/13/2022 at 5:33 PM

## 2022-02-11 RX ORDER — MOMETASONE FUROATE 50 UG/1
SPRAY, METERED NASAL
Qty: 1 EACH | Refills: 5 | Status: SHIPPED | OUTPATIENT
Start: 2022-02-11

## 2022-02-24 ENCOUNTER — OFFICE VISIT (OUTPATIENT)
Dept: PULMONOLOGY | Age: 72
End: 2022-02-24
Payer: MEDICARE

## 2022-02-24 VITALS
DIASTOLIC BLOOD PRESSURE: 74 MMHG | BODY MASS INDEX: 28.06 KG/M2 | SYSTOLIC BLOOD PRESSURE: 124 MMHG | HEART RATE: 68 BPM | TEMPERATURE: 96.6 F | HEIGHT: 70 IN | OXYGEN SATURATION: 96 % | WEIGHT: 196 LBS

## 2022-02-24 DIAGNOSIS — E11.65 TYPE 2 DIABETES MELLITUS WITH HYPERGLYCEMIA, WITH LONG-TERM CURRENT USE OF INSULIN (HCC): ICD-10-CM

## 2022-02-24 DIAGNOSIS — G47.33 OSA TREATED WITH BIPAP: Primary | ICD-10-CM

## 2022-02-24 DIAGNOSIS — Z79.4 TYPE 2 DIABETES MELLITUS WITH HYPERGLYCEMIA, WITH LONG-TERM CURRENT USE OF INSULIN (HCC): ICD-10-CM

## 2022-02-24 DIAGNOSIS — I10 ESSENTIAL HYPERTENSION: ICD-10-CM

## 2022-02-24 PROCEDURE — 3046F HEMOGLOBIN A1C LEVEL >9.0%: CPT | Performed by: NURSE PRACTITIONER

## 2022-02-24 PROCEDURE — 1036F TOBACCO NON-USER: CPT | Performed by: NURSE PRACTITIONER

## 2022-02-24 PROCEDURE — 2022F DILAT RTA XM EVC RTNOPTHY: CPT | Performed by: NURSE PRACTITIONER

## 2022-02-24 PROCEDURE — G8417 CALC BMI ABV UP PARAM F/U: HCPCS | Performed by: NURSE PRACTITIONER

## 2022-02-24 PROCEDURE — G8482 FLU IMMUNIZE ORDER/ADMIN: HCPCS | Performed by: NURSE PRACTITIONER

## 2022-02-24 PROCEDURE — 3017F COLORECTAL CA SCREEN DOC REV: CPT | Performed by: NURSE PRACTITIONER

## 2022-02-24 PROCEDURE — 1123F ACP DISCUSS/DSCN MKR DOCD: CPT | Performed by: NURSE PRACTITIONER

## 2022-02-24 PROCEDURE — 4040F PNEUMOC VAC/ADMIN/RCVD: CPT | Performed by: NURSE PRACTITIONER

## 2022-02-24 PROCEDURE — 99213 OFFICE O/P EST LOW 20 MIN: CPT | Performed by: NURSE PRACTITIONER

## 2022-02-24 PROCEDURE — G8427 DOCREV CUR MEDS BY ELIG CLIN: HCPCS | Performed by: NURSE PRACTITIONER

## 2022-02-24 ASSESSMENT — ENCOUNTER SYMPTOMS
VOMITING: 0
SHORTNESS OF BREATH: 1
NAUSEA: 0
ABDOMINAL PAIN: 0
WHEEZING: 0
DIARRHEA: 0
COUGH: 0
EYES NEGATIVE: 1

## 2022-02-24 NOTE — PROGRESS NOTES
[facility]      Camille Pierre         [id number]  2/24/2022   Chief Complaint   Patient presents with    Follow-up     LEW 1 year sleep follow up with [facility]         Pt of Dr. Elif Silver     PAP Download:   Isamar Herrera or initial AHI: 28.7     Date of initial study: 8/19/15      Compliant  100%     Noncompliant 0 %     PAP Type aircurve 10Level  15/19 cmh2o    Avg Hrs/Day 7:51  AHI: 0.3   Recorded compliance dates , 1/22/22-2/20/22  Machine/Mfg:   [x] ResMed    [] Respironics/Dreamstation   Interface:   [] Nasal    [] Nasal pillows   [x] FFM      Provider:      [x] -MICHAELA     []Diamond     [] Kirsten    [] Adriel Urbano    [] Kiley               [] [facility]      [] Adaptive    [] [address]      [] Other    Neck Size: 15.75  Mallampati Mallampati 4  ESS:  6  SAQLI: 88    Here is a scan of the most recent download:              Presentation:   Luciano Pyle presents for sleep medicine follow up for obstructive sleep apnea  Since the last visit, Luciano Pyle doing well on his PAP with no complaints   Occasional morning cramps in legs , no affecting his sleep     Equipment issues: The pressure is  acceptable, the mask is acceptable     Sleep issues:  Do you feel better? Yes  More rested? Yes   Better concentration? yes    Progress History:   Since last visit any new medical issues? Yes - pneumonia/ respiratory issues. New ER or hospital visits? No  Any new or changes in medicines? No  Any new sleep medicines? No    Review of Systems -   Review of Systems   Constitutional: Negative for activity change, appetite change, chills, fatigue, fever and unexpected weight change. HENT: Negative. Eyes: Negative. Respiratory: Positive for shortness of breath (on occasion). Negative for cough and wheezing. Cardiovascular: Negative for chest pain, palpitations and leg swelling. Gastrointestinal: Negative for abdominal pain, diarrhea, nausea and vomiting. Genitourinary: Negative. Musculoskeletal: Negative. Skin: Negative. Neurological: Negative. Occ. Leg cramps    Hematological: Negative. Psychiatric/Behavioral: Negative. Negative for sleep disturbance. Physical Exam:    BMI:  Body mass index is 28.12 kg/m². Wt Readings from Last 3 Encounters:   02/24/22 196 lb (88.9 kg)   01/13/22 192 lb 6.4 oz (87.3 kg)   01/05/22 197 lb (89.4 kg)     Weight stable / unchanged  Vitals: /74 (Site: Left Upper Arm, Position: Sitting)   Pulse 68   Temp 96.6 °F (35.9 °C)   Ht 5' 10\" (1.778 m)   Wt 196 lb (88.9 kg)   SpO2 96% Comment: onRA  BMI 28.12 kg/m²       Physical Exam  Vitals and nursing note reviewed. Constitutional:       Appearance: Normal appearance. He is normal weight. HENT:      Head: Normocephalic and atraumatic. Mouth/Throat:      Pharynx: Oropharynx is clear. Eyes:      Conjunctiva/sclera: Conjunctivae normal.   Pulmonary:      Effort: Pulmonary effort is normal. No tachypnea, bradypnea or respiratory distress. Skin:     Findings: No erythema or rash. Neurological:      Mental Status: He is alert and oriented to person, place, and time. Psychiatric:         Attention and Perception: Attention normal.         Mood and Affect: Mood normal.         Speech: Speech normal.         Behavior: Behavior normal.         Thought Content: Thought content normal.         Cognition and Memory: Cognition normal.         Judgment: Judgment normal.           ASSESSMENT/DIAGNOSIS     Diagnosis Orders   1. LEW treated with BiPAP  DME Order for CPAP as OP   2. Essential hypertension     3. Type 2 diabetes mellitus with hyperglycemia, with long-term current use of insulin ([facility]            Plan   Do you need any equipment today? Updated supply rx  - Download reviewed and discussed with patient  - He  was advised to continue current positive airway pressure therapy with above described pressure.    - He  advised to keep good compliance with current recommended pressure to get optimal results and clinical improvement  - Recommend 7-9 hours of sleep with PAP  - He was advised to call DME company regarding supplies if needed.   -He call my office for earlier appointment if needed for worsening of sleep symptoms.   - He was instructed on weight loss  - Esaw Bernheim was educated about my impression and plan. Patient verbalizesunderstanding.     We will see Gaby Oliveira back in: 1 year with download    Information added by my medical assistant/LPN was reviewed today  Electronically signed by OMID Barclay CNP on 2/24/2022 at 9:47 AM

## 2022-03-13 DIAGNOSIS — E78.00 PURE HYPERCHOLESTEROLEMIA: ICD-10-CM

## 2022-03-13 DIAGNOSIS — N52.1 ERECTILE DYSFUNCTION DUE TO DISEASES CLASSIFIED ELSEWHERE: ICD-10-CM

## 2022-03-14 RX ORDER — SILDENAFIL CITRATE 20 MG/1
TABLET ORAL
Qty: 30 TABLET | Refills: 7 | Status: SHIPPED | OUTPATIENT
Start: 2022-03-14

## 2022-03-14 RX ORDER — PRAVASTATIN SODIUM 20 MG
20 TABLET ORAL DAILY
Qty: 90 TABLET | Refills: 1 | Status: SHIPPED | OUTPATIENT
Start: 2022-03-14 | End: 2022-06-16 | Stop reason: SDUPTHER

## 2022-03-21 ENCOUNTER — NURSE ONLY (OUTPATIENT)
Dept: LAB | Age: 72
End: 2022-03-21

## 2022-03-21 LAB
AVERAGE GLUCOSE: 162 MG/DL (ref 70–126)
CALCIUM SERPL-MCNC: 11 MG/DL (ref 8.5–10.5)
HBA1C MFR BLD: 7.4 % (ref 4.4–6.4)
PHOSPHORUS: 2.8 MG/DL (ref 2.4–4.7)
PTH INTACT: 44.9 PG/ML (ref 15–65)
VITAMIN D 25-HYDROXY: 38 NG/ML (ref 30–100)

## 2022-03-24 LAB
CALCIUM URINE: 4.3 MG/DL
CALCIUM URINE: 99 MG/24HR (ref 100–240)
CREATININE URINE: 1.6 GM/24HR
CREATININE URINE: 68.6 MG/DL
HOURS COLLECTED: 24 HRS
HOURS COLLECTED: 24 HRS
URINE VOLUME MEASURE: 2300 ML
URINE VOLUME, 24 HOUR: 2300 ML

## 2022-04-12 DIAGNOSIS — K21.9 GASTROESOPHAGEAL REFLUX DISEASE, UNSPECIFIED WHETHER ESOPHAGITIS PRESENT: ICD-10-CM

## 2022-04-12 RX ORDER — FAMOTIDINE 20 MG/1
TABLET, FILM COATED ORAL
Qty: 180 TABLET | Refills: 1 | Status: SHIPPED | OUTPATIENT
Start: 2022-04-12 | End: 2022-10-10

## 2022-04-12 RX ORDER — MAGNESIUM OXIDE 400 MG/1
TABLET ORAL
Qty: 90 TABLET | Refills: 1 | Status: SHIPPED | OUTPATIENT
Start: 2022-04-12 | End: 2022-10-10

## 2022-04-18 ENCOUNTER — OFFICE VISIT (OUTPATIENT)
Dept: FAMILY MEDICINE CLINIC | Age: 72
End: 2022-04-18
Payer: MEDICARE

## 2022-04-18 VITALS
HEIGHT: 70 IN | DIASTOLIC BLOOD PRESSURE: 70 MMHG | TEMPERATURE: 97.2 F | SYSTOLIC BLOOD PRESSURE: 130 MMHG | WEIGHT: 198 LBS | BODY MASS INDEX: 28.35 KG/M2 | RESPIRATION RATE: 18 BRPM | HEART RATE: 64 BPM

## 2022-04-18 DIAGNOSIS — H00.014 HORDEOLUM EXTERNUM OF LEFT UPPER EYELID: ICD-10-CM

## 2022-04-18 DIAGNOSIS — I10 ESSENTIAL HYPERTENSION: Primary | ICD-10-CM

## 2022-04-18 PROCEDURE — 1123F ACP DISCUSS/DSCN MKR DOCD: CPT | Performed by: FAMILY MEDICINE

## 2022-04-18 PROCEDURE — 4040F PNEUMOC VAC/ADMIN/RCVD: CPT | Performed by: FAMILY MEDICINE

## 2022-04-18 PROCEDURE — G8427 DOCREV CUR MEDS BY ELIG CLIN: HCPCS | Performed by: FAMILY MEDICINE

## 2022-04-18 PROCEDURE — 3288F FALL RISK ASSESSMENT DOCD: CPT | Performed by: FAMILY MEDICINE

## 2022-04-18 PROCEDURE — 99213 OFFICE O/P EST LOW 20 MIN: CPT | Performed by: FAMILY MEDICINE

## 2022-04-18 PROCEDURE — 3017F COLORECTAL CA SCREEN DOC REV: CPT | Performed by: FAMILY MEDICINE

## 2022-04-18 PROCEDURE — G8417 CALC BMI ABV UP PARAM F/U: HCPCS | Performed by: FAMILY MEDICINE

## 2022-04-18 PROCEDURE — 1036F TOBACCO NON-USER: CPT | Performed by: FAMILY MEDICINE

## 2022-04-18 RX ORDER — LANOLIN ALCOHOL/MO/W.PET/CERES
1000 CREAM (GRAM) TOPICAL DAILY
COMMUNITY

## 2022-04-18 ASSESSMENT — PATIENT HEALTH QUESTIONNAIRE - PHQ9
2. FEELING DOWN, DEPRESSED OR HOPELESS: 0
SUM OF ALL RESPONSES TO PHQ QUESTIONS 1-9: 0
SUM OF ALL RESPONSES TO PHQ9 QUESTIONS 1 & 2: 0
1. LITTLE INTEREST OR PLEASURE IN DOING THINGS: 0

## 2022-04-18 ASSESSMENT — VISUAL ACUITY: OU: 1

## 2022-04-18 ASSESSMENT — ENCOUNTER SYMPTOMS
EYE ITCHING: 1
EYE DISCHARGE: 1
BLURRED VISION: 0
NAUSEA: 0
PHOTOPHOBIA: 0
FOREIGN BODY SENSATION: 0
VOMITING: 0
EYE REDNESS: 1
DOUBLE VISION: 0

## 2022-04-18 NOTE — PROGRESS NOTES
[address] [place] [id number]  Dept: [phone]  Dept Fax: [phone]  Loc: [phone]      Lilian Cassidy is a 71 y.o. male who presents todayfor Eye Problem (left eye lid- red swollen- x 1 week - left cheek swollen x today )      :   Eye Problem   The left eye is affected. This is a new problem. The problem occurs constantly. The problem has been gradually worsening. There was no injury mechanism. The pain is at a severity of 2/10 (lid only). The pain is mild. There is no known exposure to pink eye. He does not wear contacts. Associated symptoms include an eye discharge, eye redness (lid only) and itching. Pertinent negatives include no blurred vision, double vision, fever, foreign body sensation (feely like lid is rubbing eye a bit), nausea, photophobia, recent URI or vomiting. Treatments tried: clear eye drops and warm compress with wash rag. The treatment provided no relief. Just saw Dr. Milagro Mo with endocrinology. No changes in vision. patient is allergic to crestor [rosuvastatin], lipitor [atorvastatin], and amlodipine. Past MedicalHistory  Sherald Saint  has a past medical history of Erectile dysfunction, Hyperlipidemia, Hypertension, LEW treated with BiPAP, and Type 2 diabetes mellitus ([facility] [address] Past Surgical History  The patient  has a past surgical history that includes back surgery (1988); eye surgery (Right, 2005); Colonoscopy (07/2016); Tonsillectomy; Appendectomy (as teenager); Upper gastrointestinal endoscopy; endoscopic ultrasound (upper) (02/22/2017); Cataract removal (Bilateral); and Skin cancer excision (Left). Family History  This patient's family history includes Cancer in his mother; Diabetes in his father and mother; Heart Attack (age of onset: 72) in his paternal grandfather; Kidney Disease in his father. Social History  Sherald Saint  reports that he quit smoking about 27 years ago.  He has a 25.00 pack-year smoking history. He quit smokeless tobacco use about 32 years ago. His smokeless tobacco use included chew. He reports that he does not drink alcohol and does not use drugs.     Medications    Current Outpatient Medications:     vitamin B-12 (CYANOCOBALAMIN) 1000 MCG tablet, Take 1,000 mcg by mouth daily, Disp: , Rfl:     famotidine (PEPCID) 20 MG tablet, TAKE 1 TABLET BY MOUTH TWICE A DAY, Disp: 180 tablet, Rfl: 1    pravastatin (PRAVACHOL) 20 MG tablet, Take 1 tablet by mouth daily, Disp: 90 tablet, Rfl: 1    mometasone (NASONEX) 50 MCG/ACT nasal spray, SPRAY 2 SPRAYS INTO EACH NOSTRIL EVERY DAY (Patient taking differently: PRN), Disp: 1 each, Rfl: 5    carvedilol (COREG) 25 MG tablet, Take 1 tablet by mouth twice daily, Disp: 180 tablet, Rfl: 1    doxazosin (CARDURA) 4 MG tablet, Take 1 tablet by mouth nightly, Disp: 90 tablet, Rfl: 1    candesartan (ATACAND) 32 MG tablet, Take 1 tablet by mouth daily, Disp: 90 tablet, Rfl: 1    metFORMIN (GLUCOPHAGE) 500 MG tablet, Take 1 tablet by mouth once daily in the evening with 1000 mg tablet for a total of 1500 mg/day., Disp: 90 tablet, Rfl: 0    metFORMIN (GLUCOPHAGE) 1000 MG tablet, Take 1 tablet by mouth 2 times daily (with meals), Disp: 180 tablet, Rfl: 1    glimepiride (AMARYL) 4 MG tablet, TAKE 1 TABLET BY MOUTH IN THE MORNING AND 1/2 TABLET IN THE EVENING WITH SUPPER, Disp: 135 tablet, Rfl: 1    magnesium oxide (MAG-OX) 400 (241.3 Mg) MG TABS tablet, Take 1 tablet by mouth once daily, Disp: 90 tablet, Rfl: 1    hydroCHLOROthiazide (MICROZIDE) 12.5 MG capsule, Take 1 capsule by mouth once daily, Disp: 90 capsule, Rfl: 1    vitamin D3 (CHOLECALCIFEROL) 10 MCG (400 UNIT) TABS tablet, Take 400 Units by mouth 2 times daily, Disp: , Rfl:     ibuprofen (ADVIL;MOTRIN) 200 MG tablet, Take 400 mg by mouth every 6 hours as needed for Pain, Disp: , Rfl:     Dulaglutide (TRULICITY) 1.5 BN/9.2OM SOPN, Inject into the skin, Disp: , Rfl:    Multiple Vitamins-Minerals (THERAPEUTIC MULTIVITAMIN-MINERALS) tablet, Take 1 tablet by mouth daily, Disp: , Rfl:     Cinnamon 500 MG TABS, Take 1,000 mg by mouth daily , Disp: , Rfl:     aspirin 81 MG tablet, Take 81 mg by mouth daily, Disp: , Rfl:     magnesium oxide (MAG-OX) 400 MG tablet, TAKE 1 TABLET BY MOUTH EVERY DAY, Disp: 90 tablet, Rfl: 1    sildenafil (REVATIO) 20 MG tablet, TAKE TWO TABLETS BY MOUTH AS NEEDED ONE  HOUR  PRIOR  TO  INTERCOURSE, Disp: 30 tablet, Rfl: 7    BiPAP Machine MISC, by Does not apply route, Disp: , Rfl:     Spacer/Aero-Holding Chambers (E-Z SPACER) SARA, Use with albuterol inhaler, Disp: 1 Device, Rfl: 0    diclofenac sodium 1 % GEL, Apply 2 g topically 2 times daily To painful joints of hands, Disp: 1 Tube, Rfl: 3    ONE TOUCH ULTRA TEST STRP,   , Disp: , Rfl:     :     Review of Systems   Constitutional: Negative for fever. Eyes: Positive for discharge, redness (lid only) and itching. Negative for blurred vision, double vision and photophobia. Gastrointestinal: Negative for nausea and vomiting.       :     Vitals:    04/18/22 1157   BP: 130/70   Site: Left Upper Arm   Position: Sitting   Pulse: 64   Resp: 18   Temp: 97.2 °F (36.2 °C)   TempSrc: Temporal   Weight: 198 lb (89.8 kg)   Height: 5' 10\" (1.778 m)       Physical Exam  Vitals reviewed. Constitutional:       General: He is not in acute distress. Appearance: He is well-developed. He is not ill-appearing or toxic-appearing. HENT:      Head: Normocephalic and atraumatic. Nose: No congestion or rhinorrhea. Mouth/Throat:      Mouth: Mucous membranes are moist.      Pharynx: Oropharynx is clear. Eyes:      General: Vision grossly intact. Gaze aligned appropriately. No scleral icterus. Right eye: No foreign body, discharge or hordeolum. Left eye: Foreign body and hordeolum present. No discharge. Extraocular Movements: Extraocular movements intact.       Conjunctiva/sclera: Conjunctivae normal.      Right eye: Right conjunctiva is not injected. No chemosis, exudate or hemorrhage. Left eye: Left conjunctiva is not injected. No chemosis, exudate or hemorrhage. Pupils: Pupils are equal, round, and reactive to light. Comments: Essentially normal exam of bilateral eyes with the exception of a central stye on upper left lid. This is roughly pea sized. No warmth or drainage to suggest cellulitis. Cheeks are roughly symmetrical; any swelling here is mild. Cardiovascular:      Rate and Rhythm: Normal rate and regular rhythm. Heart sounds: Normal heart sounds. Pulmonary:      Effort: Pulmonary effort is normal. No respiratory distress. Breath sounds: Normal breath sounds. Abdominal:      Palpations: Abdomen is soft. Tenderness: There is no abdominal tenderness. Musculoskeletal:      Cervical back: Neck supple. Skin:     General: Skin is warm and dry. Comments: No obvious rash. Neurological:      Mental Status: He is alert. Comments: No obvious focal deficit. Psychiatric:         Behavior: Behavior normal.         Assessment/Plan:   1. Hordeolum externum of left upper eyelid  Perform lid hygiene with baby shampoo and a soft wash rag twice daily. Was well along lash margin. Use a warm compress with a egg or baked potato three times daily for 15-20 minutes. If worsening redness or fevers notify the office right away and I would start oral antibiotics. If worsening and during hours call and press option 3 to reach nursing. If worsening and after hours call and push the option for the doctor on call. 2. Essential hypertension  At goal.  Continue current medications candesartan and hydrochlorothiazide. Return if symptoms worsen or fail to improve. Orders Placed  No orders of the defined types were placed in this encounter.       Prescriptions given/sent   No orders of the defined types were placed in this encounter. Patient instructions given and reviewed. Discussed use, benefit, and side effects of recommended medications. All patient questions answered. Pt voiced understanding. Discussed health maintenance. Patient is due for colonoscopy; will schedule through PCP. Will ask endocrinologist about endocrinologist.  I also encouraged discussion with endocrinologist about potentially swapping amaryl for another diabetic medication with a better weight gain profile if covered by insurance.           Electronically signed by Alber Hager MD on 4/18/2022at 12:40 PM

## 2022-04-18 NOTE — PATIENT INSTRUCTIONS
Patient Education     Perform lid hygiene with baby shampoo and a soft wash rag twice daily. Was well along lash margin. Use a warm compress with a egg or baked potato three times daily for 15-20 minutes. If worsening redness or fevers notify the office right away and I would start oral antibiotics. If worsening and during hours call and press option 3 to reach nursing. If worsening and after hours call and push the option for the doctor on call. Styes and Chalazia: Care Instructions  Overview     Styes and chalazia (say \"[id number]-[id number]\") are both conditions that can causeswelling of the eyelid. A stye is an infection in the root of an eyelash. The infection causes a tender red lump on the edge of the eyelid. The infection can spread until the whole eyelid becomes red and inflamed. Styes usually break open, and a tiny amount of pus drains. They usually clear up on their own in about a week, but theysometimes need treatment with antibiotics. A chalazion is a lump or cyst in the eyelid (chalazion is singular; chalazia is plural). It is caused by swelling and inflammation of deep oil glands inside the eyelid. Chalazia are usually not infected. They can take a few months toheal.  If a chalazion becomes more swollen and painful or does not go away, you mayneed to have it drained by your doctor. Follow-up care is a key part of your treatment and safety. Be sure to make and go to all appointments, and call your doctor if you are having problems. It's also a good idea to know your test results and keep alist of the medicines you take. How can you care for yourself at home?  Do not squeeze or try to open a stye or chalazion.  To help a stye or chalazion heal faster:  ? Put a warm, moist compress on your eye for 5 to 10 minutes, 3 to 6 times a day. Heat often brings a stye to a point where it drains on its own.  Keep in mind that warm compresses will often increase swelling a little at first.  ? Do not use hot water or heat a wet cloth in a microwave oven. The compress may get too hot and can burn the eyelid.  If the doctor gave you antibiotic drops or ointment, use the medicine exactly as directed. Use the medicine for as long as instructed, even if your eye starts to feel better.  To put in eyedrops or ointment:  ? Tilt your head back, and pull your lower eyelid down with one finger. ? Drop or squirt the medicine inside the lower lid. ? Close your eye for 30 to 60 seconds to let the drops or ointment move around. ? Do not touch the ointment or dropper tip to your eyelashes or any other surface.  Do not wear eye makeup or contact lenses until the stye or chalazion heals.  Do not share towels, pillows, or washcloths while you have a stye. What can you do to prevent styes and chalazia? Here are some things you can do to prevent styes and chalazia.  Don't rub your eyes. This can irritate your eyes and let in bacteria. If you need to touch your eyes, wash your hands first.   Protect your eyes from dust and air pollution when you can. For example, wear safety glasses when you do reggie chores like raking or mowing the lawn.  Remove eye makeup before you go to sleep. Replace eye makeup, especially mascara, at least every 6 months. Bacteria can grow in makeup.  If you get styes or chalazia often, wash your eyelids regularly with a little bit of baby shampoo mixed in warm water.  Treat any inflammation or infection of the eyelid promptly. When should you call for help? Call your doctor now or seek immediate medical care if:     You have pain in your eye.      You have a change in vision or loss of vision.      Redness and swelling get much worse. Watch closely for changes in your health, and be sure to contact your doctor if:     Your stye does not get better in 1 week.      Your chalazion does not start to get better after several weeks. Where can you learn more?   Go to [email or web address] and sign in to your [employer] account. Enter T288 in the [facility] box to learn more about \"Styes and Chalazia: Care Instructions. \"     If you do not have an account, please click on the \"Sign Up Now\" link. Current as of: January 24, 2022               Content Version: 13.2  © 2006-2022 [employer]. Care instructions adapted under license by [facility] ([facility]). If you have questions about a medical condition or this instruction, always ask your healthcare professional. [place] [address] any warranty or liability for your use of this information.

## 2022-04-20 ENCOUNTER — TELEPHONE (OUTPATIENT)
Dept: FAMILY MEDICINE CLINIC | Age: 72
End: 2022-04-20

## 2022-04-20 NOTE — TELEPHONE ENCOUNTER
Per Dr. Maria Luisa Patel in rx for patient. Cefdinir 300mg take 1 tab BID for 7 days. Dispense 14 with 0 refills    Bactrim DS take 1 tab BID for 7 days. Dispense 14 tabs with 0 refills. Advised [facility] to contact office with any questions.

## 2022-06-09 DIAGNOSIS — I10 ESSENTIAL HYPERTENSION: ICD-10-CM

## 2022-06-09 RX ORDER — GLIMEPIRIDE 4 MG/1
TABLET ORAL
Qty: 135 TABLET | Refills: 1 | Status: SHIPPED | OUTPATIENT
Start: 2022-06-09

## 2022-06-09 RX ORDER — HYDROCHLOROTHIAZIDE 12.5 MG/1
CAPSULE, GELATIN COATED ORAL
Qty: 90 CAPSULE | Refills: 1 | Status: SHIPPED | OUTPATIENT
Start: 2022-06-09

## 2022-06-16 DIAGNOSIS — E78.00 PURE HYPERCHOLESTEROLEMIA: ICD-10-CM

## 2022-06-16 RX ORDER — PRAVASTATIN SODIUM 20 MG
20 TABLET ORAL DAILY
Qty: 90 TABLET | Refills: 1 | Status: SHIPPED | OUTPATIENT
Start: 2022-06-16

## 2022-07-11 ASSESSMENT — ENCOUNTER SYMPTOMS
VOICE CHANGE: 0
SORE THROAT: 0

## 2022-07-11 NOTE — PROGRESS NOTES
[address]  [address] [place] [address]  Phone:  [phone]         Medicare Annual Wellness Visit    Nilesh Henderson is here for Medicare AWV    Assessment & Plan   Encounter for Medicare annual wellness exam        -     Routine healthcare maintenance was reviewed as above. Recommendations for Preventive Services Due: see orders and patient instructions/AVS.  Recommended screening schedule for the next 5-10 years is provided to the patient in written form: see Patient Instructions/AVS.     Return for Medicare Annual Wellness Visit in 1 year. Patient's complete Health Risk Assessment and screening values have been reviewed and are found in Flowsheets. The following problems were reviewed today and where indicated follow up appointments were made and/or referrals ordered. Positive Risk Factor Screenings with Interventions:               General Health and ACP:  General  In general, how would you say your health is?: Good  In the past 7 days, have you experienced any of the following: New or Increased Pain, New or Increased Fatigue, Loneliness, Social Isolation, Stress or Anger?: No  Do you get the social and emotional support that you need?: Yes  Do you have a Living Will?: (!) No    Advance Directives     Power of  Living Will ACP-Advance Directive ACP-Power of     Not on File Not on File Not on File Not on File      General Health Risk Interventions:  · No Living Will: Patient referred to ACP Clinical Specialist              Objective   Vitals:    07/12/22 0921   BP: 116/80   Site: Left Upper Arm   Position: Sitting   Cuff Size: Large Adult   Pulse: 67   Resp: 16   Temp: 97.9 °F (36.6 °C)   TempSrc: Temporal   SpO2: 99%   Weight: 193 lb (87.5 kg)   Height: 5' 10\" (1.778 m)      Body mass index is 27.69 kg/m².          Allergies   Allergen Reactions    Crestor [Rosuvastatin]      Muscle aches    Lipitor [Atorvastatin]      Back ache    Amlodipine Swelling     Leg swelling     Prior to Visit Medications    Medication Sig Taking? Authorizing Provider   pravastatin (PRAVACHOL) 20 MG tablet Take 1 tablet by mouth daily Yes Judge Ann MD   hydroCHLOROthiazide (MICROZIDE) 12.5 MG capsule TAKE 1 CAPSULE BY MOUTH EVERY DAY Yes Judge Ann MD   glimepiride (AMARYL) 4 MG tablet TAKE 1 TABLET BY MOUTH IN THE MORNING AND 1/2 TABLET IN THE EVENING WITH SUPPER Yes Judge Ann MD   metFORMIN (GLUCOPHAGE) 500 MG tablet TAKE 1 TABLET BY MOUTH ONCE DAILY IN THE EVENING WITH 1000 MG TABLET FOR A TOTAL OF 1500 MG/DAY.  Yes Judge Ann MD   vitamin B-12 (CYANOCOBALAMIN) 1000 MCG tablet Take 1,000 mcg by mouth daily Yes Historical Provider, MD   magnesium oxide (MAG-OX) 400 MG tablet TAKE 1 TABLET BY MOUTH EVERY DAY Yes Judge Ann MD   famotidine (PEPCID) 20 MG tablet TAKE 1 TABLET BY MOUTH TWICE A DAY Yes Judge Ann MD   sildenafil (REVATIO) 20 MG tablet TAKE TWO TABLETS BY MOUTH AS NEEDED ONE  HOUR  PRIOR  TO  INTERCOURSE Yes Judge Ann MD   mometasone (NASONEX) 50 MCG/ACT nasal spray SPRAY 2 SPRAYS INTO EACH NOSTRIL EVERY DAY  Patient taking differently: PRN Yes Judge Ann MD   carvedilol (COREG) 25 MG tablet Take 1 tablet by mouth twice daily Yes Judge Ann MD   doxazosin (CARDURA) 4 MG tablet Take 1 tablet by mouth nightly Yes Judge Ann MD   candesartan (ATACAND) 32 MG tablet Take 1 tablet by mouth daily Yes Judge Ann MD   metFORMIN (GLUCOPHAGE) 1000 MG tablet Take 1 tablet by mouth 2 times daily (with meals) Yes Judge Ann MD   BiPAP Machine MISC by Does not apply route Yes Historical Provider, MD   vitamin D3 (CHOLECALCIFEROL) 10 MCG (400 UNIT) TABS tablet Take 400 Units by mouth 2 times daily Yes Historical Provider, MD   Spacer/Aero-Holding Chambers (E-Z SPACER) SARA Use with albuterol inhaler Yes Hanna Ko MD   ibuprofen (ADVIL;MOTRIN) 200 MG

## 2022-07-12 ENCOUNTER — OFFICE VISIT (OUTPATIENT)
Dept: FAMILY MEDICINE CLINIC | Age: 72
End: 2022-07-12
Payer: MEDICARE

## 2022-07-12 ENCOUNTER — NURSE ONLY (OUTPATIENT)
Dept: LAB | Age: 72
End: 2022-07-12

## 2022-07-12 VITALS
HEIGHT: 70 IN | DIASTOLIC BLOOD PRESSURE: 80 MMHG | WEIGHT: 193 LBS | BODY MASS INDEX: 27.63 KG/M2 | RESPIRATION RATE: 16 BRPM | TEMPERATURE: 97.9 F | SYSTOLIC BLOOD PRESSURE: 116 MMHG | OXYGEN SATURATION: 99 % | HEART RATE: 67 BPM

## 2022-07-12 DIAGNOSIS — E11.65 TYPE 2 DIABETES MELLITUS WITH HYPERGLYCEMIA, WITH LONG-TERM CURRENT USE OF INSULIN (HCC): ICD-10-CM

## 2022-07-12 DIAGNOSIS — Z79.4 TYPE 2 DIABETES MELLITUS WITHOUT COMPLICATION, WITH LONG-TERM CURRENT USE OF INSULIN (HCC): ICD-10-CM

## 2022-07-12 DIAGNOSIS — Z79.4 TYPE 2 DIABETES MELLITUS WITH HYPERGLYCEMIA, WITH LONG-TERM CURRENT USE OF INSULIN (HCC): ICD-10-CM

## 2022-07-12 DIAGNOSIS — E11.9 TYPE 2 DIABETES MELLITUS WITHOUT COMPLICATION, WITH LONG-TERM CURRENT USE OF INSULIN (HCC): ICD-10-CM

## 2022-07-12 DIAGNOSIS — R93.1 ELEVATED CORONARY ARTERY CALCIUM SCORE: Primary | ICD-10-CM

## 2022-07-12 DIAGNOSIS — Z00.00 ENCOUNTER FOR MEDICARE ANNUAL WELLNESS EXAM: Primary | ICD-10-CM

## 2022-07-12 DIAGNOSIS — E78.00 PURE HYPERCHOLESTEROLEMIA: ICD-10-CM

## 2022-07-12 DIAGNOSIS — I10 ESSENTIAL HYPERTENSION: ICD-10-CM

## 2022-07-12 DIAGNOSIS — E83.52 SERUM CALCIUM ELEVATED: ICD-10-CM

## 2022-07-12 LAB
ALBUMIN SERPL-MCNC: 4.2 G/DL (ref 3.5–5.1)
ALP BLD-CCNC: 66 U/L (ref 38–126)
ALT SERPL-CCNC: 14 U/L (ref 11–66)
ANION GAP SERPL CALCULATED.3IONS-SCNC: 8 MEQ/L (ref 8–16)
AST SERPL-CCNC: 14 U/L (ref 5–40)
AVERAGE GLUCOSE: 168 MG/DL (ref 70–126)
BILIRUB SERPL-MCNC: 0.2 MG/DL (ref 0.3–1.2)
BUN BLDV-MCNC: 26 MG/DL (ref 7–22)
CALCIUM SERPL-MCNC: 11.3 MG/DL (ref 8.5–10.5)
CHLORIDE BLD-SCNC: 101 MEQ/L (ref 98–111)
CHOLESTEROL, TOTAL: 131 MG/DL (ref 100–199)
CO2: 31 MEQ/L (ref 23–33)
CREAT SERPL-MCNC: 1.1 MG/DL (ref 0.4–1.2)
GFR SERPL CREATININE-BSD FRML MDRD: 66 ML/MIN/1.73M2
GLUCOSE BLD-MCNC: 150 MG/DL (ref 70–108)
HBA1C MFR BLD: 7.6 % (ref 4.4–6.4)
HDLC SERPL-MCNC: 31 MG/DL
LDL CHOLESTEROL CALCULATED: 63 MG/DL
POTASSIUM SERPL-SCNC: 5 MEQ/L (ref 3.5–5.2)
SODIUM BLD-SCNC: 140 MEQ/L (ref 135–145)
TOTAL PROTEIN: 6.5 G/DL (ref 6.1–8)
TRIGL SERPL-MCNC: 187 MG/DL (ref 0–199)

## 2022-07-12 PROCEDURE — 99213 OFFICE O/P EST LOW 20 MIN: CPT | Performed by: FAMILY MEDICINE

## 2022-07-12 PROCEDURE — G0439 PPPS, SUBSEQ VISIT: HCPCS | Performed by: FAMILY MEDICINE

## 2022-07-12 PROCEDURE — 3017F COLORECTAL CA SCREEN DOC REV: CPT | Performed by: FAMILY MEDICINE

## 2022-07-12 PROCEDURE — 1123F ACP DISCUSS/DSCN MKR DOCD: CPT | Performed by: FAMILY MEDICINE

## 2022-07-12 PROCEDURE — 3051F HG A1C>EQUAL 7.0%<8.0%: CPT | Performed by: FAMILY MEDICINE

## 2022-07-12 RX ORDER — FLASH GLUCOSE SCANNING READER
EACH MISCELLANEOUS
Qty: 1 EACH | Refills: 3 | Status: SHIPPED | OUTPATIENT
Start: 2022-07-12

## 2022-07-12 RX ORDER — FLASH GLUCOSE SENSOR
KIT MISCELLANEOUS
Qty: 7 EACH | Refills: 3 | Status: SHIPPED | OUTPATIENT
Start: 2022-07-12

## 2022-07-12 ASSESSMENT — PATIENT HEALTH QUESTIONNAIRE - PHQ9
SUM OF ALL RESPONSES TO PHQ QUESTIONS 1-9: 0
2. FEELING DOWN, DEPRESSED OR HOPELESS: 0
1. LITTLE INTEREST OR PLEASURE IN DOING THINGS: 0
SUM OF ALL RESPONSES TO PHQ9 QUESTIONS 1 & 2: 0
SUM OF ALL RESPONSES TO PHQ QUESTIONS 1-9: 0

## 2022-07-12 ASSESSMENT — LIFESTYLE VARIABLES: HOW OFTEN DO YOU HAVE A DRINK CONTAINING ALCOHOL: NEVER

## 2022-07-12 ASSESSMENT — ENCOUNTER SYMPTOMS
VOMITING: 0
CONSTIPATION: 0
TROUBLE SWALLOWING: 1
RHINORRHEA: 1
DIARRHEA: 1
COUGH: 0
BLOOD IN STOOL: 0
NAUSEA: 0
SHORTNESS OF BREATH: 0

## 2022-07-12 NOTE — PROGRESS NOTES
Explained ear wax removal procedure to patient with patient verbalizing understanding. right ear irrigation performed with warm irrigation fluid, noting  Large amount of cerumen flushed from  ear successfully. Patient tolerated well. Ear canals now clear, tympanic membranes visible.

## 2022-07-12 NOTE — PATIENT INSTRUCTIONS
Personalized Preventive Plan for Erwin Letters - 7/12/2022  Medicare offers a range of preventive health benefits. Some of the tests and screenings are paid in full while other may be subject to a deductible, co-insurance, and/or copay. Some of these benefits include a comprehensive review of your medical history including lifestyle, illnesses that may run in your family, and various assessments and screenings as appropriate. After reviewing your medical record and screening and assessments performed today your provider may have ordered immunizations, labs, imaging, and/or referrals for you. A list of these orders (if applicable) as well as your Preventive Care list are included within your After Visit Summary for your review. Other Preventive Recommendations:    · A preventive eye exam performed by an eye specialist is recommended every 1-2 years to screen for glaucoma; cataracts, macular degeneration, and other eye disorders. · A preventive dental visit is recommended every 6 months. · Try to get at least 150 minutes of exercise per week or 10,000 steps per day on a pedometer . · Order or download the FREE \"Exercise & Physical Activity: Your Everyday Guide\" from [employer]. Call 7-643.909.7887 or search [employer] online. · You need 1929-2549 mg of calcium and 4999-4186 IU of vitamin D per day. It is possible to meet your calcium requirement with diet alone, but a vitamin D supplement is usually necessary to meet this goal.  · When exposed to the sun, use a sunscreen that protects against both UVA and UVB radiation with an SPF of 30 or greater. Reapply every 2 to 3 hours or after sweating, drying off with a towel, or swimming. · Always wear a seat belt when traveling in a car. Always wear a helmet when riding a bicycle or motorcycle.

## 2022-07-13 DIAGNOSIS — I10 ESSENTIAL HYPERTENSION: ICD-10-CM

## 2022-07-13 RX ORDER — CANDESARTAN 32 MG/1
TABLET ORAL
Qty: 90 TABLET | Refills: 1 | Status: SHIPPED | OUTPATIENT
Start: 2022-07-13

## 2022-08-08 ENCOUNTER — NURSE ONLY (OUTPATIENT)
Dept: LAB | Age: 72
End: 2022-08-08

## 2022-08-08 LAB
AVERAGE GLUCOSE: 168 MG/DL (ref 70–126)
BILIRUBIN URINE: NEGATIVE
BLOOD, URINE: NEGATIVE
CALCIUM SERPL-MCNC: 11.1 MG/DL (ref 8.5–10.5)
CHARACTER, URINE: CLEAR
COLOR: YELLOW
CORTISOL COLLECTION INFO: NORMAL
CORTISOL: 12.83 UG/DL
GLUCOSE, URINE: NEGATIVE MG/DL
HBA1C MFR BLD: 7.6 % (ref 4.4–6.4)
KETONES, URINE: NEGATIVE
LEUKOCYTE EST, POC: NEGATIVE
NITRITE, URINE: NEGATIVE
PH UA: 6.5 (ref 5–9)
PHOSPHORUS: 3.1 MG/DL (ref 2.4–4.7)
PROTEIN UA: NEGATIVE MG/DL
SPECIFIC GRAVITY UA: 1.01 (ref 1–1.03)
UROBILINOGEN, URINE: 0.2 EU/DL (ref 0–1)
VITAMIN D 25-HYDROXY: 37 NG/ML (ref 30–100)

## 2022-08-13 LAB — PROTEIN ELECTROPHORESIS, SERUM: NORMAL

## 2022-08-27 DIAGNOSIS — I10 ESSENTIAL HYPERTENSION: ICD-10-CM

## 2022-08-28 DIAGNOSIS — I10 ESSENTIAL HYPERTENSION: ICD-10-CM

## 2022-08-29 RX ORDER — CARVEDILOL 25 MG/1
TABLET ORAL
Qty: 180 TABLET | Refills: 1 | OUTPATIENT
Start: 2022-08-29

## 2022-08-29 RX ORDER — CARVEDILOL 25 MG/1
TABLET ORAL
Qty: 180 TABLET | Refills: 1 | Status: SHIPPED | OUTPATIENT
Start: 2022-08-29

## 2022-09-03 DIAGNOSIS — I10 ESSENTIAL HYPERTENSION: ICD-10-CM

## 2022-09-06 RX ORDER — DOXAZOSIN MESYLATE 4 MG/1
4 TABLET ORAL NIGHTLY
Qty: 90 TABLET | Refills: 1 | Status: SHIPPED | OUTPATIENT
Start: 2022-09-06

## 2022-10-10 DIAGNOSIS — K21.9 GASTROESOPHAGEAL REFLUX DISEASE, UNSPECIFIED WHETHER ESOPHAGITIS PRESENT: ICD-10-CM

## 2022-10-10 RX ORDER — FAMOTIDINE 20 MG/1
TABLET, FILM COATED ORAL
Qty: 180 TABLET | Refills: 1 | Status: SHIPPED | OUTPATIENT
Start: 2022-10-10

## 2022-10-10 RX ORDER — LANOLIN ALCOHOL/MO/W.PET/CERES
CREAM (GRAM) TOPICAL
Qty: 90 TABLET | Refills: 1 | Status: SHIPPED | OUTPATIENT
Start: 2022-10-10

## 2022-11-15 RX ORDER — GLIMEPIRIDE 4 MG/1
4 TABLET ORAL 2 TIMES DAILY
Qty: 180 TABLET | Refills: 1 | Status: SHIPPED | OUTPATIENT
Start: 2022-11-15

## 2022-12-02 DIAGNOSIS — I10 ESSENTIAL HYPERTENSION: ICD-10-CM

## 2022-12-02 RX ORDER — HYDROCHLOROTHIAZIDE 12.5 MG/1
CAPSULE, GELATIN COATED ORAL
Qty: 90 CAPSULE | Refills: 0 | Status: SHIPPED | OUTPATIENT
Start: 2022-12-02

## 2022-12-27 ENCOUNTER — TELEPHONE (OUTPATIENT)
Dept: FAMILY MEDICINE CLINIC | Age: 72
End: 2022-12-27

## 2022-12-27 NOTE — TELEPHONE ENCOUNTER
Pts wife called stating the he tested positive for COVID yesterday. He began feeling ill on Xmas night. She stated he has a bad cough and congestion and wanted to know if he could rx'ed paxlovid. She stated that he is susceptible to pneumonia and does not want it to turn into that. Please advise.

## 2023-01-06 DIAGNOSIS — I10 ESSENTIAL HYPERTENSION: ICD-10-CM

## 2023-01-06 RX ORDER — CANDESARTAN 32 MG/1
TABLET ORAL
Qty: 90 TABLET | Refills: 1 | Status: SHIPPED | OUTPATIENT
Start: 2023-01-06

## 2023-02-13 DIAGNOSIS — I10 ESSENTIAL HYPERTENSION: ICD-10-CM

## 2023-02-14 RX ORDER — CANDESARTAN 32 MG/1
32 TABLET ORAL DAILY
Qty: 90 TABLET | Refills: 1 | Status: SHIPPED | OUTPATIENT
Start: 2023-02-14

## 2023-02-22 ENCOUNTER — OFFICE VISIT (OUTPATIENT)
Dept: FAMILY MEDICINE CLINIC | Age: 73
End: 2023-02-22

## 2023-02-22 VITALS
HEART RATE: 70 BPM | BODY MASS INDEX: 27.69 KG/M2 | RESPIRATION RATE: 14 BRPM | WEIGHT: 193 LBS | TEMPERATURE: 97.8 F | DIASTOLIC BLOOD PRESSURE: 70 MMHG | SYSTOLIC BLOOD PRESSURE: 122 MMHG

## 2023-02-22 DIAGNOSIS — Z79.4 TYPE 2 DIABETES MELLITUS WITH HYPERGLYCEMIA, WITH LONG-TERM CURRENT USE OF INSULIN (HCC): Primary | ICD-10-CM

## 2023-02-22 DIAGNOSIS — I10 ESSENTIAL HYPERTENSION: ICD-10-CM

## 2023-02-22 DIAGNOSIS — K21.9 GASTROESOPHAGEAL REFLUX DISEASE, UNSPECIFIED WHETHER ESOPHAGITIS PRESENT: ICD-10-CM

## 2023-02-22 DIAGNOSIS — E11.65 TYPE 2 DIABETES MELLITUS WITH HYPERGLYCEMIA, WITH LONG-TERM CURRENT USE OF INSULIN (HCC): Primary | ICD-10-CM

## 2023-02-22 DIAGNOSIS — E78.00 PURE HYPERCHOLESTEROLEMIA: ICD-10-CM

## 2023-02-22 LAB — HBA1C MFR BLD: 7.8 % (ref 4.3–5.7)

## 2023-02-22 RX ORDER — GLIMEPIRIDE 4 MG/1
4 TABLET ORAL 2 TIMES DAILY
Qty: 180 TABLET | Refills: 1 | Status: SHIPPED | OUTPATIENT
Start: 2023-02-22

## 2023-02-22 SDOH — ECONOMIC STABILITY: FOOD INSECURITY: WITHIN THE PAST 12 MONTHS, YOU WORRIED THAT YOUR FOOD WOULD RUN OUT BEFORE YOU GOT MONEY TO BUY MORE.: NEVER TRUE

## 2023-02-22 SDOH — ECONOMIC STABILITY: HOUSING INSECURITY
IN THE LAST 12 MONTHS, WAS THERE A TIME WHEN YOU DID NOT HAVE A STEADY PLACE TO SLEEP OR SLEPT IN A SHELTER (INCLUDING NOW)?: NO

## 2023-02-22 SDOH — ECONOMIC STABILITY: FOOD INSECURITY: WITHIN THE PAST 12 MONTHS, THE FOOD YOU BOUGHT JUST DIDN'T LAST AND YOU DIDN'T HAVE MONEY TO GET MORE.: NEVER TRUE

## 2023-02-22 SDOH — ECONOMIC STABILITY: INCOME INSECURITY: HOW HARD IS IT FOR YOU TO PAY FOR THE VERY BASICS LIKE FOOD, HOUSING, MEDICAL CARE, AND HEATING?: NOT HARD AT ALL

## 2023-02-22 ASSESSMENT — PATIENT HEALTH QUESTIONNAIRE - PHQ9
SUM OF ALL RESPONSES TO PHQ QUESTIONS 1-9: 0
SUM OF ALL RESPONSES TO PHQ QUESTIONS 1-9: 0
2. FEELING DOWN, DEPRESSED OR HOPELESS: 0
1. LITTLE INTEREST OR PLEASURE IN DOING THINGS: 0
SUM OF ALL RESPONSES TO PHQ QUESTIONS 1-9: 0
SUM OF ALL RESPONSES TO PHQ9 QUESTIONS 1 & 2: 0
SUM OF ALL RESPONSES TO PHQ QUESTIONS 1-9: 0

## 2023-02-22 ASSESSMENT — ENCOUNTER SYMPTOMS
SHORTNESS OF BREATH: 0
BLOOD IN STOOL: 0
DIARRHEA: 0
CONSTIPATION: 0
VOMITING: 0
COUGH: 0
NAUSEA: 0

## 2023-02-22 NOTE — PROGRESS NOTES
[address]  [address] [place] [address]  Phone:  [phone]          Name: Rani Carr  : 1950    Chief Complaint   Patient presents with    Check-Up       HPI:     Rani Carr is a 71 y.o. male who presents today for follow up of T2DM, hypertension, and hyperlipidemia. His last HbA1c was 7.6% in August.  We tried to get the PlastiPure sensor but insurance wouldn't cover it. Fasting BG yesterday was 147 and today was 137. No hypoglycemia. He's not exercising much. He's trying to cut out a lot of bread and potatoes and is eating more vegetables. He's scheduled for his diabetic eye exam on 3/15/23.     Lab Results   Component Value Date    CHOL 131 2022    TRIG 187 2022    HDL 31 2022    LDLCALC 63 2022     Lab Results   Component Value Date    ALT 14 2022    AST 14 2022        Lab Results   Component Value Date/Time     2022 10:05 AM    K 5.0 2022 10:05 AM     2022 10:05 AM    CO2 31 2022 10:05 AM    BUN 26 2022 10:05 AM    CREATININE 1.1 2022 10:05 AM    GLUCOSE 150 2022 10:05 AM    CALCIUM 11.1 2022 08:36 AM          Current Outpatient Medications:     metFORMIN (GLUCOPHAGE) 1000 MG tablet, Take 1 tablet by mouth 2 times daily (with meals), Disp: 180 tablet, Rfl: 1    metFORMIN (GLUCOPHAGE) 500 MG tablet, Take 1 tablet by mouth every evening, Disp: 90 tablet, Rfl: 1    glimepiride (AMARYL) 4 MG tablet, Take 1 tablet by mouth in the morning and at bedtime, Disp: 180 tablet, Rfl: 1    candesartan (ATACAND) 32 MG tablet, Take 1 tablet by mouth daily, Disp: 90 tablet, Rfl: 1    hydroCHLOROthiazide (MICROZIDE) 12.5 MG capsule, TAKE 1 CAPSULE BY MOUTH EVERY DAY, Disp: 90 capsule, Rfl: 0    famotidine (PEPCID) 20 MG tablet, TAKE 1 TABLET BY MOUTH TWICE A DAY, Disp: 180 tablet, Rfl: 1    magnesium oxide (MAG-OX) 400 (240 Mg) MG tablet, TAKE 1 TABLET BY MOUTH EVERY DAY, Disp: 90 tablet, Rfl: 1    doxazosin (CARDURA) 4 MG tablet, TAKE 1 TABLET BY MOUTH NIGHTLY, Disp: 90 tablet, Rfl: 1    carvedilol (COREG) 25 MG tablet, Take 1 tablet by mouth twice daily, Disp: 180 tablet, Rfl: 1    pravastatin (PRAVACHOL) 20 MG tablet, Take 1 tablet by mouth daily, Disp: 90 tablet, Rfl: 1    vitamin B-12 (CYANOCOBALAMIN) 1000 MCG tablet, Take 1,000 mcg by mouth daily, Disp: , Rfl:     sildenafil (REVATIO) 20 MG tablet, TAKE TWO TABLETS BY MOUTH AS NEEDED ONE  HOUR  PRIOR  TO  INTERCOURSE, Disp: 30 tablet, Rfl: 7    mometasone (NASONEX) 50 MCG/ACT nasal spray, SPRAY 2 SPRAYS INTO EACH NOSTRIL EVERY DAY (Patient taking differently: PRN), Disp: 1 each, Rfl: 5    BiPAP Machine MISC, by Does not apply route, Disp: , Rfl:     Spacer/Aero-Holding Chambers (E-Z SPACER) SARA, Use with albuterol inhaler, Disp: 1 Device, Rfl: 0    diclofenac sodium 1 % GEL, Apply 2 g topically 2 times daily To painful joints of hands, Disp: 1 Tube, Rfl: 3    dulaglutide (TRULICITY) 1.5 XR/5.4FA SC injection, Inject into the skin, Disp: , Rfl:     Multiple Vitamins-Minerals (THERAPEUTIC MULTIVITAMIN-MINERALS) tablet, Take 1 tablet by mouth daily, Disp: , Rfl:     ONE TOUCH ULTRA TEST STRP,   , Disp: , Rfl:     Cinnamon 500 MG TABS, Take 1,000 mg by mouth daily , Disp: , Rfl:     aspirin 81 MG tablet, Take 81 mg by mouth daily, Disp: , Rfl:     vitamin D3 (CHOLECALCIFEROL) 10 MCG (400 UNIT) TABS tablet, Take 400 Units by mouth 2 times daily, Disp: , Rfl:     ibuprofen (ADVIL;MOTRIN) 200 MG tablet, Take 400 mg by mouth every 6 hours as needed for Pain, Disp: , Rfl:     Allergies   Allergen Reactions    Crestor [Rosuvastatin]      Muscle aches    Lipitor [Atorvastatin]      Back ache    Amlodipine Swelling     Leg swelling       Subjective:      Review of Systems   Constitutional:  Negative for chills and fever. Respiratory:  Negative for cough and shortness of breath. Cardiovascular:  Negative for chest pain and palpitations. Gastrointestinal:  Negative for blood in stool, constipation, diarrhea, nausea and vomiting. Genitourinary:  Negative for hematuria. Allergic/Immunologic: Negative for environmental allergies and food allergies. Psychiatric/Behavioral:  Negative for dysphoric mood. The patient is not nervous/anxious. Objective:     /70 (Site: Left Upper Arm, Position: Sitting, Cuff Size: Medium Adult)   Pulse 70   Temp 97.8 °F (36.6 °C) (Temporal)   Resp 14   Wt 193 lb (87.5 kg)   BMI 27.69 kg/m²     Physical Exam  Vitals and nursing note reviewed. Constitutional:       Appearance: He is well-developed. HENT:      Head: Normocephalic and atraumatic. Mouth/Throat:      Mouth: Mucous membranes are moist.      Pharynx: No oropharyngeal exudate or posterior oropharyngeal erythema. Eyes:      Conjunctiva/sclera: Conjunctivae normal.   Cardiovascular:      Rate and Rhythm: Normal rate and regular rhythm. Heart sounds: Normal heart sounds. Pulmonary:      Effort: Pulmonary effort is normal. No respiratory distress. Breath sounds: Normal breath sounds. Abdominal:      General: Bowel sounds are normal.      Palpations: Abdomen is soft. Tenderness: There is no abdominal tenderness. Musculoskeletal:      Cervical back: Normal range of motion and neck supple. Skin:     General: Skin is warm and dry. Neurological:      Mental Status: He is alert and oriented to person, place, and time. Motor: No abnormal muscle tone. Assessment/Plan:     Nova Garza was seen today for check-up. Diagnoses and all orders for this visit:    Type 2 diabetes mellitus with hyperglycemia, with long-term current use of insulin ([address]  -     HbA1c is elevated at 7.8% but he'd like to continue to work on a healthy diet and increased physical activity. -     POCT glycosylated hemoglobin (Hb A1C)  -     metFORMIN (GLUCOPHAGE) 1000 MG tablet;  Take 1 tablet by mouth 2 times daily (with meals)  - metFORMIN (GLUCOPHAGE) 500 MG tablet; Take 1 tablet by mouth every evening  -     glimepiride (AMARYL) 4 MG tablet; Take 1 tablet by mouth in the morning and at bedtime    Essential hypertension        -     His blood pressure is controlled so will continue on current medications. Pure hypercholesterolemia        -     A healthy diet and routine physical activity encouraged. Continue on statin. Gastroesophageal reflux disease, unspecified whether esophagitis present        -     Symptoms are controlled so continue on Pepcid. Return in about 6 months (around 8/22/2023) for Medicare AWV, diabetes, hypertension.     Electronically signed by Musa Bean MD on 2/22/2023 at 9:34 AM

## 2023-02-23 ENCOUNTER — OFFICE VISIT (OUTPATIENT)
Dept: PULMONOLOGY | Age: 73
End: 2023-02-23
Payer: MEDICARE

## 2023-02-23 VITALS
SYSTOLIC BLOOD PRESSURE: 120 MMHG | HEIGHT: 69 IN | HEART RATE: 65 BPM | WEIGHT: 192.4 LBS | BODY MASS INDEX: 28.5 KG/M2 | OXYGEN SATURATION: 98 % | DIASTOLIC BLOOD PRESSURE: 74 MMHG | TEMPERATURE: 97.9 F

## 2023-02-23 DIAGNOSIS — G47.33 OSA TREATED WITH BIPAP: Primary | ICD-10-CM

## 2023-02-23 DIAGNOSIS — Z79.4 TYPE 2 DIABETES MELLITUS WITH HYPERGLYCEMIA, WITH LONG-TERM CURRENT USE OF INSULIN (HCC): ICD-10-CM

## 2023-02-23 DIAGNOSIS — E11.65 TYPE 2 DIABETES MELLITUS WITH HYPERGLYCEMIA, WITH LONG-TERM CURRENT USE OF INSULIN (HCC): ICD-10-CM

## 2023-02-23 PROCEDURE — 3017F COLORECTAL CA SCREEN DOC REV: CPT | Performed by: NURSE PRACTITIONER

## 2023-02-23 PROCEDURE — 1036F TOBACCO NON-USER: CPT | Performed by: NURSE PRACTITIONER

## 2023-02-23 PROCEDURE — G8417 CALC BMI ABV UP PARAM F/U: HCPCS | Performed by: NURSE PRACTITIONER

## 2023-02-23 PROCEDURE — G8427 DOCREV CUR MEDS BY ELIG CLIN: HCPCS | Performed by: NURSE PRACTITIONER

## 2023-02-23 PROCEDURE — 99213 OFFICE O/P EST LOW 20 MIN: CPT | Performed by: NURSE PRACTITIONER

## 2023-02-23 PROCEDURE — 3051F HG A1C>EQUAL 7.0%<8.0%: CPT | Performed by: NURSE PRACTITIONER

## 2023-02-23 PROCEDURE — 3074F SYST BP LT 130 MM HG: CPT | Performed by: NURSE PRACTITIONER

## 2023-02-23 PROCEDURE — G8484 FLU IMMUNIZE NO ADMIN: HCPCS | Performed by: NURSE PRACTITIONER

## 2023-02-23 PROCEDURE — 3078F DIAST BP <80 MM HG: CPT | Performed by: NURSE PRACTITIONER

## 2023-02-23 PROCEDURE — 1123F ACP DISCUSS/DSCN MKR DOCD: CPT | Performed by: NURSE PRACTITIONER

## 2023-02-23 PROCEDURE — 2022F DILAT RTA XM EVC RTNOPTHY: CPT | Performed by: NURSE PRACTITIONER

## 2023-02-23 ASSESSMENT — ENCOUNTER SYMPTOMS
NAUSEA: 0
COUGH: 0
DIARRHEA: 0
EYES NEGATIVE: 1
ABDOMINAL PAIN: 0
VOMITING: 0
SHORTNESS OF BREATH: 0
WHEEZING: 0

## 2023-02-23 NOTE — PROGRESS NOTES
[facility]      Vika Arredondo         [id number]  2/23/2023   Chief Complaint   Patient presents with    Follow-up     1 year LEW follow up with download. Pt of Dr. Andrzej ORLANDO Download:   Original or initial AHI: 28.7     Date of initial study: 8/19/15    Compliant  100%  Noncompliant 0%     PAP Type Spont Level  IPAP 87yhQ53 EPAP 60vhP56   Avg Hrs/Day 8 hours 6 minutes  AHI: 0.5   Recorded compliance dates 1/24/23-2/22/23  Machine/Mfg:   [x] ResMed    [] Respironics/Dreamstation   Interface:   [] Nasal    [] Nasal pillows   [x] FFM      Provider:      [x] -HMABRAHAM     []Diamond     [] [facility]    [] Shani Granda    [] [place]               [] [facility]      [] Adaptive    [] [address]      [] Other    Neck Size: 16 inches  Mallampati 4  ESS:  7  SAQLI: 86    Here is a scan of the most recent download:                  Presentation:   Roro Obregon presents for 1 yearsleep medicine follow up for obstructive sleep apnea  Since the last visit, Roro Obregon doing well, no issues. Benefits from use     Equipment issues: The pressure is  acceptable, the mask is acceptable     Progress History:   Since last visit any new medical issues? No  Sleep Related Issues? No  New ER or hospital visits? No  Any new or changes in medicines? No  Any new sleep medicines? No    Review of Systems -   Review of Systems   Constitutional:  Negative for activity change, appetite change, chills, fatigue, fever and unexpected weight change. HENT: Negative. Eyes: Negative. Respiratory:  Negative for cough, shortness of breath and wheezing. Cardiovascular:  Negative for chest pain, palpitations and leg swelling. Gastrointestinal:  Negative for abdominal pain, diarrhea, nausea and vomiting. Genitourinary: Negative. Musculoskeletal: Negative. Skin: Negative. Neurological: Negative. Hematological: Negative. Psychiatric/Behavioral: Negative.         Physical Exam:    BMI: Body mass index is 28.41 kg/m². Wt Readings from Last 3 Encounters:   02/23/23 192 lb 6.4 oz (87.3 kg)   02/22/23 193 lb (87.5 kg)   07/12/22 193 lb (87.5 kg)     Weight stable / unchanged  Vitals: /74 (Site: Right Upper Arm, Position: Sitting, Cuff Size: Medium Adult)   Pulse 65   Temp 97.9 °F (36.6 °C) (Oral)   Ht 5' 9\" (1.753 m)   Wt 192 lb 6.4 oz (87.3 kg)   SpO2 98% Comment: Patient on room air  BMI 28.41 kg/m²       Physical Exam  Vitals and nursing note reviewed. Constitutional:       Appearance: Normal appearance. He is overweight. HENT:      Head: Normocephalic and atraumatic. Mouth/Throat:      Pharynx: Oropharynx is clear. Eyes:      Conjunctiva/sclera: Conjunctivae normal.   Pulmonary:      Effort: Pulmonary effort is normal. No tachypnea, bradypnea or respiratory distress. Skin:     Findings: No erythema or rash. Neurological:      Mental Status: He is alert and oriented to person, place, and time. Psychiatric:         Attention and Perception: Attention normal.         Mood and Affect: Mood normal.         Speech: Speech normal.         Behavior: Behavior normal.         Thought Content: Thought content normal.         Cognition and Memory: Cognition normal.         Judgment: Judgment normal.         ASSESSMENT/DIAGNOSIS     Diagnosis Orders   1. LEW treated with BiPAP  DME Order for CPAP as OP      2. Type 2 diabetes mellitus with hyperglycemia, with long-term current use of insulin ([facility]                 Plan   Do you need any equipment today? Yes -printed rx for supplies, faxed to patient's DME     - Download reviewed and discussed with patient  - He  was advised to continue current positive airway pressure therapy with above described pressure.    - He  advised to keep good compliance with current recommended pressure to get optimal results and clinical improvement  - Recommend 7-9 hours of sleep with PAP  - He was advised to call DME company regarding supplies if needed.   -He call my office for earlier appointment if needed for worsening of sleep symptoms.   - He was instructed on weight loss  -DM currently controlled, pt reports sugars have been up and down , is working with PCP   - Cookie Sams was educated about my impression and plan. Patient verbalizesunderstanding.   We will see Avani Campo back in: 1 year with download    Information added by my medical assistant/LPN was reviewed today    billing based on medical decision making     OMID Freeman-CNP   2/23/2023

## 2023-03-03 DIAGNOSIS — E11.65 TYPE 2 DIABETES MELLITUS WITH HYPERGLYCEMIA, WITH LONG-TERM CURRENT USE OF INSULIN (HCC): ICD-10-CM

## 2023-03-03 DIAGNOSIS — I10 ESSENTIAL HYPERTENSION: ICD-10-CM

## 2023-03-03 DIAGNOSIS — Z79.4 TYPE 2 DIABETES MELLITUS WITH HYPERGLYCEMIA, WITH LONG-TERM CURRENT USE OF INSULIN (HCC): ICD-10-CM

## 2023-03-03 RX ORDER — CARVEDILOL 25 MG/1
TABLET ORAL
Qty: 180 TABLET | Refills: 1 | Status: SHIPPED | OUTPATIENT
Start: 2023-03-03

## 2023-03-03 RX ORDER — DOXAZOSIN MESYLATE 4 MG/1
4 TABLET ORAL NIGHTLY
Qty: 90 TABLET | Refills: 1 | Status: SHIPPED | OUTPATIENT
Start: 2023-03-03

## 2023-03-23 ENCOUNTER — OFFICE VISIT (OUTPATIENT)
Dept: FAMILY MEDICINE CLINIC | Age: 73
End: 2023-03-23
Payer: MEDICARE

## 2023-03-23 VITALS
SYSTOLIC BLOOD PRESSURE: 138 MMHG | RESPIRATION RATE: 16 BRPM | WEIGHT: 197 LBS | TEMPERATURE: 97 F | OXYGEN SATURATION: 98 % | DIASTOLIC BLOOD PRESSURE: 64 MMHG | HEART RATE: 64 BPM | BODY MASS INDEX: 29.09 KG/M2

## 2023-03-23 DIAGNOSIS — Z79.4 TYPE 2 DIABETES MELLITUS WITH HYPERGLYCEMIA, WITH LONG-TERM CURRENT USE OF INSULIN (HCC): ICD-10-CM

## 2023-03-23 DIAGNOSIS — E11.65 TYPE 2 DIABETES MELLITUS WITH HYPERGLYCEMIA, WITH LONG-TERM CURRENT USE OF INSULIN (HCC): ICD-10-CM

## 2023-03-23 DIAGNOSIS — J20.9 ACUTE BRONCHITIS, UNSPECIFIED ORGANISM: Primary | ICD-10-CM

## 2023-03-23 PROCEDURE — 1036F TOBACCO NON-USER: CPT | Performed by: FAMILY MEDICINE

## 2023-03-23 PROCEDURE — G8427 DOCREV CUR MEDS BY ELIG CLIN: HCPCS | Performed by: FAMILY MEDICINE

## 2023-03-23 PROCEDURE — 3017F COLORECTAL CA SCREEN DOC REV: CPT | Performed by: FAMILY MEDICINE

## 2023-03-23 PROCEDURE — 3078F DIAST BP <80 MM HG: CPT | Performed by: FAMILY MEDICINE

## 2023-03-23 PROCEDURE — 3075F SYST BP GE 130 - 139MM HG: CPT | Performed by: FAMILY MEDICINE

## 2023-03-23 PROCEDURE — G8417 CALC BMI ABV UP PARAM F/U: HCPCS | Performed by: FAMILY MEDICINE

## 2023-03-23 PROCEDURE — G8484 FLU IMMUNIZE NO ADMIN: HCPCS | Performed by: FAMILY MEDICINE

## 2023-03-23 PROCEDURE — 99213 OFFICE O/P EST LOW 20 MIN: CPT | Performed by: FAMILY MEDICINE

## 2023-03-23 PROCEDURE — 1123F ACP DISCUSS/DSCN MKR DOCD: CPT | Performed by: FAMILY MEDICINE

## 2023-03-23 RX ORDER — AZITHROMYCIN 250 MG/1
TABLET, FILM COATED ORAL
Qty: 6 TABLET | Refills: 0 | Status: SHIPPED | OUTPATIENT
Start: 2023-03-23

## 2023-03-23 RX ORDER — BLOOD-GLUCOSE METER
1 EACH MISCELLANEOUS DAILY
Qty: 1 KIT | Refills: 0 | Status: SHIPPED | OUTPATIENT
Start: 2023-03-23

## 2023-03-23 RX ORDER — SILDENAFIL 50 MG/1
50 TABLET, FILM COATED ORAL DAILY PRN
Qty: 10 TABLET | Refills: 0 | Status: SHIPPED | OUTPATIENT
Start: 2023-03-23

## 2023-03-23 ASSESSMENT — ENCOUNTER SYMPTOMS
SINUS PRESSURE: 1
COUGH: 1
SORE THROAT: 0
WHEEZING: 1
SHORTNESS OF BREATH: 0

## 2023-03-23 NOTE — PROGRESS NOTES
[address]  [address] [place] [address]  Phone:  [phone]          Name: Darlene Chin  : 1950    Chief Complaint   Patient presents with    Chest Congestion     Started this morning       HPI:     Darlene Chin is a 68 y.o. male who presents today for evaluation of nasal congestion and chest congestion. The nasal congestion began a few days ago and the chest congestion started this morning. His cough is mildly productive. He's wheezing some. No SOB. No fevers. He's been taking some Coricidin but he's not sure it's helping. Current Outpatient Medications:     azithromycin (ZITHROMAX Z-SCOT) 250 MG tablet, Take two (2) tablets by mouth on day 1, then take one (1) tablet by mouth daily for four (4) days. , Disp: 6 tablet, Rfl: 0    carvedilol (COREG) 25 MG tablet, Take 1 tablet by mouth twice daily, Disp: 180 tablet, Rfl: 1    doxazosin (CARDURA) 4 MG tablet, Take 1 tablet by mouth nightly, Disp: 90 tablet, Rfl: 1    metFORMIN (GLUCOPHAGE) 1000 MG tablet, Take 1 tablet by mouth 2 times daily (with meals), Disp: 180 tablet, Rfl: 1    metFORMIN (GLUCOPHAGE) 500 MG tablet, Take 1 tablet by mouth every evening, Disp: 90 tablet, Rfl: 1    glimepiride (AMARYL) 4 MG tablet, Take 1 tablet by mouth in the morning and at bedtime, Disp: 180 tablet, Rfl: 1    candesartan (ATACAND) 32 MG tablet, Take 1 tablet by mouth daily, Disp: 90 tablet, Rfl: 1    hydroCHLOROthiazide (MICROZIDE) 12.5 MG capsule, TAKE 1 CAPSULE BY MOUTH EVERY DAY, Disp: 90 capsule, Rfl: 0    famotidine (PEPCID) 20 MG tablet, TAKE 1 TABLET BY MOUTH TWICE A DAY, Disp: 180 tablet, Rfl: 1    magnesium oxide (MAG-OX) 400 (240 Mg) MG tablet, TAKE 1 TABLET BY MOUTH EVERY DAY, Disp: 90 tablet, Rfl: 1    pravastatin (PRAVACHOL) 20 MG tablet, Take 1 tablet by mouth daily, Disp: 90 tablet, Rfl: 1    vitamin B-12 (CYANOCOBALAMIN) 1000 MCG tablet, Take 1,000 mcg by mouth daily, Disp: , Rfl:     sildenafil (REVATIO) 20

## 2023-03-26 DIAGNOSIS — I10 ESSENTIAL HYPERTENSION: ICD-10-CM

## 2023-03-27 RX ORDER — HYDROCHLOROTHIAZIDE 12.5 MG/1
12.5 CAPSULE, GELATIN COATED ORAL DAILY
Qty: 90 CAPSULE | Refills: 1 | Status: SHIPPED | OUTPATIENT
Start: 2023-03-27

## 2023-04-15 DIAGNOSIS — K21.9 GASTROESOPHAGEAL REFLUX DISEASE, UNSPECIFIED WHETHER ESOPHAGITIS PRESENT: ICD-10-CM

## 2023-04-17 DIAGNOSIS — Z79.4 TYPE 2 DIABETES MELLITUS WITH HYPERGLYCEMIA, WITH LONG-TERM CURRENT USE OF INSULIN (HCC): ICD-10-CM

## 2023-04-17 DIAGNOSIS — E11.65 TYPE 2 DIABETES MELLITUS WITH HYPERGLYCEMIA, WITH LONG-TERM CURRENT USE OF INSULIN (HCC): ICD-10-CM

## 2023-04-17 DIAGNOSIS — K21.9 GASTROESOPHAGEAL REFLUX DISEASE, UNSPECIFIED WHETHER ESOPHAGITIS PRESENT: ICD-10-CM

## 2023-04-17 RX ORDER — FAMOTIDINE 20 MG/1
TABLET, FILM COATED ORAL
Qty: 180 TABLET | Refills: 0 | Status: SHIPPED | OUTPATIENT
Start: 2023-04-17 | End: 2023-04-17 | Stop reason: SDUPTHER

## 2023-04-17 RX ORDER — FAMOTIDINE 20 MG/1
20 TABLET, FILM COATED ORAL 2 TIMES DAILY
Qty: 180 TABLET | Refills: 0 | Status: SHIPPED | OUTPATIENT
Start: 2023-04-17

## 2023-04-20 ENCOUNTER — OFFICE VISIT (OUTPATIENT)
Dept: FAMILY MEDICINE CLINIC | Age: 73
End: 2023-04-20

## 2023-04-20 ENCOUNTER — HOSPITAL ENCOUNTER (OUTPATIENT)
Age: 73
Discharge: HOME | End: 2023-04-20
Payer: MEDICARE

## 2023-04-20 ENCOUNTER — HOSPITAL ENCOUNTER (OUTPATIENT)
Dept: GENERAL RADIOLOGY | Age: 73
Discharge: HOME | End: 2023-04-20
Payer: MEDICARE

## 2023-04-20 VITALS
HEART RATE: 63 BPM | RESPIRATION RATE: 14 BRPM | OXYGEN SATURATION: 98 % | SYSTOLIC BLOOD PRESSURE: 138 MMHG | TEMPERATURE: 98.7 F | DIASTOLIC BLOOD PRESSURE: 70 MMHG | BODY MASS INDEX: 28.5 KG/M2 | WEIGHT: 193 LBS

## 2023-04-20 DIAGNOSIS — R06.02 SHORTNESS OF BREATH: Primary | ICD-10-CM

## 2023-04-20 DIAGNOSIS — I83.892 SYMPTOMATIC VARICOSE VEINS, LEFT: ICD-10-CM

## 2023-04-20 DIAGNOSIS — R06.02 SHORTNESS OF BREATH: ICD-10-CM

## 2023-04-20 DIAGNOSIS — Z86.16 HISTORY OF COVID-19: ICD-10-CM

## 2023-04-20 LAB — D DIMER PPP IA.FEU-MCNC: 1417 NG/ML FEU (ref 0–500)

## 2023-04-20 PROCEDURE — 85379 FIBRIN DEGRADATION QUANT: CPT

## 2023-04-20 PROCEDURE — 36415 COLL VENOUS BLD VENIPUNCTURE: CPT

## 2023-04-20 PROCEDURE — 71046 X-RAY EXAM CHEST 2 VIEWS: CPT

## 2023-04-20 ASSESSMENT — ENCOUNTER SYMPTOMS
RHINORRHEA: 1
WHEEZING: 0
COUGH: 1
EYE REDNESS: 0
NAUSEA: 0
SHORTNESS OF BREATH: 1
SORE THROAT: 0
VOMITING: 0
COLOR CHANGE: 0

## 2023-04-20 NOTE — PROGRESS NOTES
D-dimer to rule out PE and a CXR to evaluate for possible pneumonia. -     D-Dimer, Quantitative; Future  -     XR CHEST STANDARD (2 VW); Future    Symptomatic varicose veins, left  -     He has a symptomatic large varicose vein of his left medial leg so will refer to [facility] for further evaluation and treatment. -     AFL - Alfonso Gonzalez MD, [facility], [facility].VIERTEL        Return if symptoms worsen or fail to improve.     Electronically signed by Manuela Yi MD on 4/20/2023 at 3:04 PM

## 2023-04-21 ENCOUNTER — HOSPITAL ENCOUNTER (EMERGENCY)
Age: 73
Discharge: HOME | End: 2023-04-21
Attending: EMERGENCY MEDICINE
Payer: MEDICARE

## 2023-04-21 ENCOUNTER — APPOINTMENT (OUTPATIENT)
Dept: CT IMAGING | Age: 73
End: 2023-04-21
Payer: MEDICARE

## 2023-04-21 VITALS
HEIGHT: 69 IN | OXYGEN SATURATION: 98 % | RESPIRATION RATE: 17 BRPM | DIASTOLIC BLOOD PRESSURE: 75 MMHG | SYSTOLIC BLOOD PRESSURE: 142 MMHG | WEIGHT: 193 LBS | HEART RATE: 62 BPM | BODY MASS INDEX: 28.58 KG/M2

## 2023-04-21 DIAGNOSIS — U07.1 COVID: ICD-10-CM

## 2023-04-21 DIAGNOSIS — R06.00 DYSPNEA, UNSPECIFIED TYPE: Primary | ICD-10-CM

## 2023-04-21 LAB
ALBUMIN SERPL BCP-MCNC: 3.5 GM/DL (ref 3.4–5)
ALP SERPL-CCNC: 72 U/L (ref 46–116)
ALT SERPL W P-5'-P-CCNC: 25 U/L (ref 14–63)
ANION GAP SERPL CALC-SCNC: 6 MEQ/L (ref 8–16)
AST SERPL W P-5'-P-CCNC: 17 U/L (ref 15–37)
BASOPHILS # BLD: 0.7 % (ref 0–3)
BASOPHILS ABSOLUTE: 0.1 THOU/MM3 (ref 0–0.1)
BILIRUB SERPL-MCNC: 0.5 MG/DL (ref 0.2–1)
BUN SERPL-MCNC: 21 MG/DL (ref 7–18)
CALCIUM SERPL-MCNC: 10.4 MG/DL (ref 8.5–10.1)
CHLORIDE SERPL-SCNC: 103 MEQ/L (ref 98–107)
CO2 SERPL-SCNC: 33 MEQ/L (ref 21–32)
CREAT SERPL-MCNC: 1.1 MG/DL (ref 0.6–1.3)
EOSINOPHILS ABSOLUTE: 0.2 THOU/MM3 (ref 0–0.5)
EOSINOPHILS RELATIVE PERCENT: 2.3 % (ref 0–4)
GFR SERPL CREATININE-BSD FRML MDRD: > 60 ML/MIN/1.73M2
GLUCOSE SERPL-MCNC: 174 MG/DL (ref 74–106)
HCT VFR BLD CALC: 36.6 % (ref 42–52)
HEMOGLOBIN: 11.7 GM/DL (ref 14–18)
IMMATURE GRANS (ABS): 0.03 THOU/MM3 (ref 0–0.07)
IMMATURE GRANULOCYTES: 0 %
LYMPHOCYTES # BLD AUTO: 31.3 % (ref 15–47)
LYMPHOCYTES ABSOLUTE: 2.2 THOU/MM3 (ref 1–4.8)
MCH RBC QN AUTO: 28.3 PG (ref 26–32)
MCHC RBC AUTO-ENTMCNC: 32 GM/DL (ref 31–35)
MCV RBC AUTO: 88.4 FL (ref 80–94)
MONOCYTES: 0.7 THOU/MM3 (ref 0.3–1.3)
MONOCYTES: 9.7 % (ref 0–12)
NT PRO BNP: 25 PG/ML (ref 0–900)
PDW BLD-RTO: 13.1 % (ref 11.5–14.9)
PLATELET # BLD AUTO: 223 THOU/MM3 (ref 130–400)
PMV BLD AUTO: 10 FL (ref 9.4–12.4)
POTASSIUM SERPL-SCNC: 4.4 MEQ/L (ref 3.5–5.1)
PROT SERPL-MCNC: 7.2 GM/DL (ref 6.4–8.2)
RBC # BLD: 4.14 MILL/MM3 (ref 4.5–6.1)
SEG NEUTROPHILS: 55.6 % (ref 43–75)
SEGMENTED NEUTROPHILS ABSOLUTE COUNT: 3.9 THOU/MM3 (ref 1.8–7.7)
SODIUM SERPL-SCNC: 142 MEQ/L (ref 136–145)
TROPONIN, HIGH SENSITIVITY: 6.3 PG/ML (ref 0–76.1)
WBC # BLD: 6.9 THOU/MM3 (ref 4.8–10.8)

## 2023-04-21 PROCEDURE — 6360000004 HC RX CONTRAST MEDICATION: Performed by: EMERGENCY MEDICINE

## 2023-04-21 PROCEDURE — 93005 ELECTROCARDIOGRAM TRACING: CPT | Performed by: EMERGENCY MEDICINE

## 2023-04-21 PROCEDURE — 85025 COMPLETE CBC W/AUTO DIFF WBC: CPT

## 2023-04-21 PROCEDURE — 83880 ASSAY OF NATRIURETIC PEPTIDE: CPT

## 2023-04-21 PROCEDURE — 84484 ASSAY OF TROPONIN QUANT: CPT

## 2023-04-21 PROCEDURE — 71275 CT ANGIOGRAPHY CHEST: CPT

## 2023-04-21 PROCEDURE — 99285 EMERGENCY DEPT VISIT HI MDM: CPT

## 2023-04-21 PROCEDURE — 80053 COMPREHEN METABOLIC PANEL: CPT

## 2023-04-21 RX ADMIN — IOPAMIDOL 100 ML: 755 INJECTION, SOLUTION INTRAVENOUS at 12:49

## 2023-04-21 ASSESSMENT — PAIN - FUNCTIONAL ASSESSMENT: PAIN_FUNCTIONAL_ASSESSMENT: NONE - DENIES PAIN

## 2023-04-21 NOTE — ED PROVIDER NOTES
[id number]  [phone]    Call   Follow up from ER condition    61 MD Kaushik Kuo  [address]  [address] [facility] [address]    Call   Follow up from ER condition    Ez May, OMID - CNP  [address] [facility]  [address] [place] [address]    Call   Follow up from ER condition      DISCHARGE MEDICATIONS:  New Prescriptions    No medications on file       (Please note that portions of this note were completed with a voice recognition program.  Efforts were made to edit the dictations but occasionally words are mis-transcribed.)    Kathie Jones MD (electronically signed)  Attending Emergency Physician                      Kathie Jones MD  04/21/23 2640

## 2023-04-21 NOTE — DISCHARGE INSTRUCTIONS
Continue home medications. Call your primary care doctor for close follow-up. Follow-up with cardiology and back with pulmonary. Monitor for worsening shortness of breath or problems.

## 2023-04-21 NOTE — ED NOTES
Patient presents to the ED via private auto with complaints of SOB and chest heaviness that has been ongoing for about 12 days. States that the symptoms started around the same time he had a positive covid test.  Patient voices that he has been able to work outside and be active like he normally is but once he goes in and sits down for a rest he feels SOB. Saw his PCP yesterday who ordered outpatient lab work. States she informed him this morning that he needed to come in and get a CT scan. Denies chest pain and states that he just has heaviness and cant breathe easily.      Sid Solano RN  04/21/23 0723

## 2023-04-21 NOTE — ED NOTES
Discharge teaching and instructions for condition explained to patient. AVS reviewed. Patient voiced understanding regarding prescriptions, follow up appointments and care of self at home. Pt discharged to home in stable condition per self with wife.        Dawna Villanueva RN  04/21/23 1550

## 2023-04-22 LAB
EKG ATRIAL RATE: 65 BPM
EKG P AXIS: 24 DEGREES
EKG P-R INTERVAL: 192 MS
EKG Q-T INTERVAL: 366 MS
EKG QRS DURATION: 90 MS
EKG QTC CALCULATION (BAZETT): 380 MS
EKG T AXIS: 17 DEGREES
EKG VENTRICULAR RATE: 65 BPM

## 2023-04-22 PROCEDURE — 93010 ELECTROCARDIOGRAM REPORT: CPT | Performed by: INTERNAL MEDICINE

## 2023-05-08 ENCOUNTER — TELEPHONE (OUTPATIENT)
Dept: FAMILY MEDICINE CLINIC | Age: 73
End: 2023-05-08

## 2023-05-08 ENCOUNTER — TELEPHONE (OUTPATIENT)
Dept: CARDIOLOGY CLINIC | Age: 73
End: 2023-05-08

## 2023-05-08 DIAGNOSIS — R07.89 CHEST PRESSURE: ICD-10-CM

## 2023-05-08 DIAGNOSIS — R07.9 CHEST PAIN, UNSPECIFIED TYPE: ICD-10-CM

## 2023-05-08 DIAGNOSIS — I10 ESSENTIAL HYPERTENSION: ICD-10-CM

## 2023-05-08 DIAGNOSIS — R01.1 HEART MURMUR: Primary | ICD-10-CM

## 2023-05-08 NOTE — TELEPHONE ENCOUNTER
SLADE for pt to return call. Pt is needing a NP appt. Pt has a f/u scheduled with Dr. Stacie Aguirre 5-22-23. Does pt want to follow with us or Dr. Stacie Aguirre?     Diagnosis   R07.9 (ICD-10-CM) - Chest pain, unspecified type   R07.89 (ICD-10-CM) - Chest pressure   I10 (ICD-10-CM) - Essential hypertension   R01.1 (ICD-10-CM) - Heart murmur

## 2023-05-09 NOTE — TELEPHONE ENCOUNTER
Spoke with patient . He states he wants to be seen in [facility]. Appointment made.

## 2023-05-10 ENCOUNTER — OFFICE VISIT (OUTPATIENT)
Dept: CARDIOLOGY CLINIC | Age: 73
End: 2023-05-10
Payer: MEDICARE

## 2023-05-10 VITALS
HEIGHT: 69 IN | HEART RATE: 68 BPM | BODY MASS INDEX: 28.29 KG/M2 | DIASTOLIC BLOOD PRESSURE: 68 MMHG | SYSTOLIC BLOOD PRESSURE: 122 MMHG | WEIGHT: 191 LBS

## 2023-05-10 DIAGNOSIS — R06.00 DYSPNEA, UNSPECIFIED TYPE: Primary | ICD-10-CM

## 2023-05-10 PROCEDURE — 3078F DIAST BP <80 MM HG: CPT | Performed by: INTERNAL MEDICINE

## 2023-05-10 PROCEDURE — 3017F COLORECTAL CA SCREEN DOC REV: CPT | Performed by: INTERNAL MEDICINE

## 2023-05-10 PROCEDURE — 99204 OFFICE O/P NEW MOD 45 MIN: CPT | Performed by: INTERNAL MEDICINE

## 2023-05-10 PROCEDURE — 1123F ACP DISCUSS/DSCN MKR DOCD: CPT | Performed by: INTERNAL MEDICINE

## 2023-05-10 PROCEDURE — 3074F SYST BP LT 130 MM HG: CPT | Performed by: INTERNAL MEDICINE

## 2023-05-10 PROCEDURE — 1036F TOBACCO NON-USER: CPT | Performed by: INTERNAL MEDICINE

## 2023-05-10 PROCEDURE — G8417 CALC BMI ABV UP PARAM F/U: HCPCS | Performed by: INTERNAL MEDICINE

## 2023-05-10 PROCEDURE — G8427 DOCREV CUR MEDS BY ELIG CLIN: HCPCS | Performed by: INTERNAL MEDICINE

## 2023-05-10 RX ORDER — TRIAMCINOLONE ACETONIDE 0.25 MG/G
CREAM TOPICAL
COMMUNITY
Start: 2023-04-04

## 2023-05-10 RX ORDER — BUMETANIDE 0.5 MG/1
0.5 TABLET ORAL PRN
Qty: 30 TABLET | Refills: 0 | Status: SHIPPED | OUTPATIENT
Start: 2023-05-10

## 2023-05-10 NOTE — PROGRESS NOTES
New patient here for check up - chest pain , vein clinic wants clearance for leg procedure    Pt c/o chest heaviness, chest pain , describes as a dull pain that shoot to left side, rates pain 3-4/10, sob, fatigue, rests often, notice these symptoms since having covid in 4/2023, heart palpitations describes as heart pounding, swelling in bilateral legs and feet, wears hose
Pulmonary/Chest: Effort normal and breath sounds normal. No respiratory distress. No wheezes. No rales. Abdominal: Soft. Bowel sounds are normal. No distension. There is no tenderness. Musculoskeletal: Normal range of motion. No edema. Neurological: Alert and oriented to person, place, and time. No cranial nerve deficit. Coordination normal.   Skin: Skin is warm and dry. Psychiatric: Normal mood and affect.        Lab Results   Component Value Date/Time    CKTOTAL 51 01/17/2017 02:55 PM       Lab Results   Component Value Date/Time    WBC 6.9 04/21/2023 12:14 PM    RBC 4.14 04/21/2023 12:14 PM    HGB 11.7 04/21/2023 12:14 PM    HCT 36.6 04/21/2023 12:14 PM    MCV 88.4 04/21/2023 12:14 PM    MCH 28.3 04/21/2023 12:14 PM    MCHC 32.0 04/21/2023 12:14 PM    RDW 13.1 04/21/2023 12:14 PM     04/21/2023 12:14 PM    MPV 10.0 04/21/2023 12:14 PM       Lab Results   Component Value Date/Time     04/21/2023 12:14 PM    K 4.4 04/21/2023 12:14 PM     04/21/2023 12:14 PM    CO2 33 04/21/2023 12:14 PM    BUN 21 04/21/2023 12:14 PM    LABALBU 3.5 04/21/2023 12:14 PM    CREATININE 1.1 04/21/2023 12:14 PM    CALCIUM 11.1 08/08/2022 08:36 AM    LABGLOM 66 07/12/2022 10:05 AM    GLUCOSE 174 04/21/2023 12:14 PM       Lab Results   Component Value Date/Time    ALKPHOS 72 04/21/2023 12:14 PM    ALT 25 04/21/2023 12:14 PM    AST 17 04/21/2023 12:14 PM    PROT 7.2 04/21/2023 12:14 PM    BILITOT 0.5 04/21/2023 12:14 PM    BILIDIR <0.2 09/15/2017 07:30 AM    LABALBU 3.5 04/21/2023 12:14 PM       Lab Results   Component Value Date/Time    MG 1.5 06/09/2018 08:00 AM       No results found for: INR, PROTIME      Lab Results   Component Value Date/Time    LABA1C 7.8 02/22/2023 08:22 AM    LABA1C 7.6 08/08/2022 08:36 AM       Lab Results   Component Value Date/Time    TRIG 187 07/12/2022 10:05 AM    HDL 31 07/12/2022 10:05 AM    LDLCALC 63 07/12/2022 10:05 AM       Lab Results   Component Value Date/Time    TSH 2.210

## 2023-05-11 ENCOUNTER — HOSPITAL ENCOUNTER (OUTPATIENT)
Dept: NON INVASIVE DIAGNOSTICS | Age: 73
Discharge: HOME | End: 2023-05-11
Payer: MEDICARE

## 2023-05-11 LAB
LV EF: 58 %
LVEF MODALITY: NORMAL

## 2023-05-11 PROCEDURE — 93306 TTE W/DOPPLER COMPLETE: CPT

## 2023-06-06 DIAGNOSIS — E78.00 PURE HYPERCHOLESTEROLEMIA: ICD-10-CM

## 2023-06-06 RX ORDER — PRAVASTATIN SODIUM 20 MG
20 TABLET ORAL DAILY
Qty: 90 TABLET | Refills: 1 | Status: SHIPPED | OUTPATIENT
Start: 2023-06-06

## 2023-06-07 ENCOUNTER — OFFICE VISIT (OUTPATIENT)
Dept: CARDIOLOGY CLINIC | Age: 73
End: 2023-06-07
Payer: MEDICARE

## 2023-06-07 VITALS
HEIGHT: 69 IN | WEIGHT: 192 LBS | BODY MASS INDEX: 28.44 KG/M2 | DIASTOLIC BLOOD PRESSURE: 68 MMHG | SYSTOLIC BLOOD PRESSURE: 122 MMHG | HEART RATE: 68 BPM

## 2023-06-07 DIAGNOSIS — R06.02 SHORTNESS OF BREATH: Primary | ICD-10-CM

## 2023-06-07 PROCEDURE — G8417 CALC BMI ABV UP PARAM F/U: HCPCS | Performed by: INTERNAL MEDICINE

## 2023-06-07 PROCEDURE — 3074F SYST BP LT 130 MM HG: CPT | Performed by: INTERNAL MEDICINE

## 2023-06-07 PROCEDURE — 3078F DIAST BP <80 MM HG: CPT | Performed by: INTERNAL MEDICINE

## 2023-06-07 PROCEDURE — 99214 OFFICE O/P EST MOD 30 MIN: CPT | Performed by: INTERNAL MEDICINE

## 2023-06-07 PROCEDURE — 1036F TOBACCO NON-USER: CPT | Performed by: INTERNAL MEDICINE

## 2023-06-07 PROCEDURE — 3017F COLORECTAL CA SCREEN DOC REV: CPT | Performed by: INTERNAL MEDICINE

## 2023-06-07 PROCEDURE — 1123F ACP DISCUSS/DSCN MKR DOCD: CPT | Performed by: INTERNAL MEDICINE

## 2023-06-07 PROCEDURE — G8428 CUR MEDS NOT DOCUMENT: HCPCS | Performed by: INTERNAL MEDICINE

## 2023-06-07 NOTE — PROGRESS NOTES
[address] [facility] CARDIOLOGY  [address] [address]  Dept: [phone]  Dept Fax: [phone]  Loc: [phone]    Visit Date: 6/7/2023    Mr. Suki Sneed is a 68 y.o. male  who presented for:  Chief Complaint   Patient presents with    Check-Up       HPI:   69 yo M c hx of DM, HTN, HLD, LEW on BiPAP is here for evaluation of chest pains and shortness of breath. Had Ceferinozak in 3/2023 and since then the symptoms have been getting worse. Quit smoking 30 yrs. He is complaint with Bipap for 5 yrs. HbA1c: 7.8. EKG is NSR, stress test in 2021 showed no ischemia. He underwent Echo which was normal, EF 55-60%, normal valvular function. Current Outpatient Medications:     pravastatin (PRAVACHOL) 20 MG tablet, Take 1 tablet by mouth daily, Disp: 90 tablet, Rfl: 1    triamcinolone (KENALOG) 0.025 % cream, APPLY CREAM TOPICALLY TO SKIN TWICE DAILY, Disp: , Rfl:     bumetanide (BUMEX) 0.5 MG tablet, Take 1 tablet by mouth as needed (swelling), Disp: 30 tablet, Rfl: 0    blood glucose test strips (ASCENSIA AUTODISC VI;ONE TOUCH ULTRA TEST VI) strip, Use with associated glucose meter to test twice daily. , Disp: 100 strip, Rfl: 5    famotidine (PEPCID) 20 MG tablet, Take 1 tablet by mouth 2 times daily, Disp: 180 tablet, Rfl: 0    hydroCHLOROthiazide (MICROZIDE) 12.5 MG capsule, Take 1 capsule by mouth daily, Disp: 90 capsule, Rfl: 1    Blood Glucose Monitoring Suppl (ONE TOUCH ULTRA 2) w/Device KIT, 1 kit by Does not apply route daily, Disp: 1 kit, Rfl: 0    sildenafil (VIAGRA) 50 MG tablet, Take 1 tablet by mouth daily as needed for Erectile Dysfunction, Disp: 10 tablet, Rfl: 0    carvedilol (COREG) 25 MG tablet, Take 1 tablet by mouth twice daily, Disp: 180 tablet, Rfl: 1    doxazosin (CARDURA) 4 MG tablet, Take 1 tablet by mouth nightly, Disp: 90 tablet, Rfl: 1    metFORMIN (GLUCOPHAGE) 1000 MG tablet, Take 1 tablet by mouth 2 times daily (with meals),

## 2023-08-10 ASSESSMENT — ENCOUNTER SYMPTOMS
BLOOD IN STOOL: 0
SHORTNESS OF BREATH: 0
NAUSEA: 0
DIARRHEA: 0
COUGH: 0
VOMITING: 0
CONSTIPATION: 0

## 2023-08-10 NOTE — PROGRESS NOTES
[address] [address] [place] [address] [facility]  Phone:  [phone]          Name: Brian Mayers  : 1950    Chief Complaint   Patient presents with    Medicare AWV    Sinus Problem     Drainage      Wound Check     Possible spider bite right flank       HPI:     Brian Mayers is a 68 y.o. male who presents today for follow up of T2DM, hypertension, and hyperlipidemia. His last HbA1c was 7.8% in February. He's following with Dr. Lilia Ruano who recommended he go off HCTZ. He was on it to help with LE edema but has had some veins stripped at the [address] that has helped. He also wears compression stockings. He has a red area on his right flank that's been there about 4 days. He thinks he was bit by a spider. He's been applying triple antibiotic ointment and it's decreasing in size. He's had a lot of sinus pressure and nasal drainage. The past few days he feels it's settling in his chest.  A few days ago his temp was 99.6 so he took Tylenol which helped. He's not using OTC medication.     Lab Results   Component Value Date    CHOL 131 2022    TRIG 187 2022    HDL 31 2022    LDLCALC 63 2022     Lab Results   Component Value Date    ALT 25 2023    AST 17 2023        Lab Results   Component Value Date/Time     2023 12:14 PM    K 4.4 2023 12:14 PM     2023 12:14 PM    CO2 33 2023 12:14 PM    BUN 21 2023 12:14 PM    CREATININE 1.1 2023 12:14 PM    GLUCOSE 174 2023 12:14 PM    CALCIUM 11.1 2023 09:24 AM          Current Outpatient Medications:     candesartan (ATACAND) 32 MG tablet, Take 1 tablet by mouth daily, Disp: 90 tablet, Rfl: 1    pravastatin (PRAVACHOL) 20 MG tablet, Take 1 tablet by mouth daily, Disp: 90 tablet, Rfl: 1    triamcinolone (KENALOG) 0.025 % cream, APPLY CREAM TOPICALLY TO SKIN TWICE DAILY, Disp: , Rfl:     bumetanide (BUMEX) 0.5 MG tablet, Take 1
Yes Historical Provider, MD   aspirin 81 MG tablet Take 1 tablet by mouth daily Yes Historical Provider, MD   hydroCHLOROthiazide (MICROZIDE) 12.5 MG capsule Take 1 capsule by mouth daily  Patient not taking: Reported on 8/16/2023  Damien Rodriguez MD   magnesium oxide (MAG-OX) 400 (240 Mg) MG tablet TAKE 1 TABLET BY MOUTH EVERY DAY  Patient not taking: Reported on 8/16/2023  Damien Rodriguez MD       CareTeam (Including outside providers/suppliers regularly involved in providing care):   Patient Care Team:  Damien Rodriguez MD as PCP - General (Family Medicine)  Damien Rodriguez MD as PCP - Empaneled Provider  Erik Euceda MD as Consulting Physician (Sleep Medicine [facility])     Reviewed and updated this visit:  Tobacco  Allergies  Meds  Med Hx  Surg Hx  Soc Hx  Fam Hx

## 2023-08-13 DIAGNOSIS — I10 ESSENTIAL HYPERTENSION: ICD-10-CM

## 2023-08-14 RX ORDER — CANDESARTAN 32 MG/1
32 TABLET ORAL DAILY
Qty: 90 TABLET | Refills: 1 | Status: SHIPPED | OUTPATIENT
Start: 2023-08-14

## 2023-08-16 ENCOUNTER — NURSE ONLY (OUTPATIENT)
Dept: LAB | Age: 73
End: 2023-08-16

## 2023-08-16 ENCOUNTER — OFFICE VISIT (OUTPATIENT)
Dept: FAMILY MEDICINE CLINIC | Age: 73
End: 2023-08-16
Payer: MEDICARE

## 2023-08-16 VITALS
TEMPERATURE: 97 F | OXYGEN SATURATION: 98 % | DIASTOLIC BLOOD PRESSURE: 64 MMHG | BODY MASS INDEX: 28.44 KG/M2 | SYSTOLIC BLOOD PRESSURE: 120 MMHG | RESPIRATION RATE: 18 BRPM | WEIGHT: 192 LBS | HEIGHT: 69 IN | HEART RATE: 62 BPM

## 2023-08-16 DIAGNOSIS — Z00.00 ENCOUNTER FOR MEDICARE ANNUAL WELLNESS EXAM: Primary | ICD-10-CM

## 2023-08-16 DIAGNOSIS — E11.65 TYPE 2 DIABETES MELLITUS WITH HYPERGLYCEMIA, WITH LONG-TERM CURRENT USE OF INSULIN (HCC): ICD-10-CM

## 2023-08-16 DIAGNOSIS — J01.90 ACUTE BACTERIAL SINUSITIS: ICD-10-CM

## 2023-08-16 DIAGNOSIS — B96.89 ACUTE BACTERIAL SINUSITIS: ICD-10-CM

## 2023-08-16 DIAGNOSIS — K21.9 GASTROESOPHAGEAL REFLUX DISEASE, UNSPECIFIED WHETHER ESOPHAGITIS PRESENT: ICD-10-CM

## 2023-08-16 DIAGNOSIS — I10 ESSENTIAL HYPERTENSION: ICD-10-CM

## 2023-08-16 DIAGNOSIS — Z79.4 TYPE 2 DIABETES MELLITUS WITH HYPERGLYCEMIA, WITH LONG-TERM CURRENT USE OF INSULIN (HCC): ICD-10-CM

## 2023-08-16 DIAGNOSIS — E78.00 PURE HYPERCHOLESTEROLEMIA: ICD-10-CM

## 2023-08-16 LAB
ALBUMIN SERPL BCG-MCNC: 4.1 G/DL (ref 3.5–5.1)
ALP SERPL-CCNC: 60 U/L (ref 38–126)
ALT SERPL W/O P-5'-P-CCNC: 10 U/L (ref 11–66)
ANION GAP SERPL CALC-SCNC: 13 MEQ/L (ref 8–16)
AST SERPL-CCNC: 10 U/L (ref 5–40)
BILIRUB SERPL-MCNC: 0.4 MG/DL (ref 0.3–1.2)
BUN SERPL-MCNC: 19 MG/DL (ref 7–22)
CALCIUM SERPL-MCNC: 10.8 MG/DL (ref 8.5–10.5)
CHLORIDE SERPL-SCNC: 102 MEQ/L (ref 98–111)
CHOLEST SERPL-MCNC: 133 MG/DL (ref 100–199)
CO2 SERPL-SCNC: 27 MEQ/L (ref 23–33)
CREAT SERPL-MCNC: 1.1 MG/DL (ref 0.4–1.2)
CREAT UR-MCNC: 142.8 MG/DL
GFR SERPL CREATININE-BSD FRML MDRD: > 60 ML/MIN/1.73M2
GLUCOSE SERPL-MCNC: 175 MG/DL (ref 70–108)
HBA1C MFR BLD: 7.9 %
HDLC SERPL-MCNC: 39 MG/DL
LDLC SERPL CALC-MCNC: 77 MG/DL
MICROALBUMIN UR-MCNC: 1.37 MG/DL
MICROALBUMIN/CREAT RATIO PNL UR: 10 MG/G (ref 0–30)
POTASSIUM SERPL-SCNC: 4.6 MEQ/L (ref 3.5–5.2)
PROT SERPL-MCNC: 6.9 G/DL (ref 6.1–8)
SODIUM SERPL-SCNC: 142 MEQ/L (ref 135–145)
TRIGL SERPL-MCNC: 84 MG/DL (ref 0–199)

## 2023-08-16 PROCEDURE — 3051F HG A1C>EQUAL 7.0%<8.0%: CPT | Performed by: FAMILY MEDICINE

## 2023-08-16 PROCEDURE — 3078F DIAST BP <80 MM HG: CPT | Performed by: FAMILY MEDICINE

## 2023-08-16 PROCEDURE — 3074F SYST BP LT 130 MM HG: CPT | Performed by: FAMILY MEDICINE

## 2023-08-16 PROCEDURE — 83037 HB GLYCOSYLATED A1C HOME DEV: CPT | Performed by: FAMILY MEDICINE

## 2023-08-16 PROCEDURE — 1123F ACP DISCUSS/DSCN MKR DOCD: CPT | Performed by: FAMILY MEDICINE

## 2023-08-16 PROCEDURE — G0439 PPPS, SUBSEQ VISIT: HCPCS | Performed by: FAMILY MEDICINE

## 2023-08-16 PROCEDURE — 3017F COLORECTAL CA SCREEN DOC REV: CPT | Performed by: FAMILY MEDICINE

## 2023-08-16 PROCEDURE — 99213 OFFICE O/P EST LOW 20 MIN: CPT | Performed by: FAMILY MEDICINE

## 2023-08-16 RX ORDER — AMOXICILLIN AND CLAVULANATE POTASSIUM 875; 125 MG/1; MG/1
1 TABLET, FILM COATED ORAL 2 TIMES DAILY
Qty: 20 TABLET | Refills: 0 | Status: SHIPPED | OUTPATIENT
Start: 2023-08-16 | End: 2023-08-26

## 2023-08-16 ASSESSMENT — PATIENT HEALTH QUESTIONNAIRE - PHQ9
SUM OF ALL RESPONSES TO PHQ QUESTIONS 1-9: 0
1. LITTLE INTEREST OR PLEASURE IN DOING THINGS: 0
SUM OF ALL RESPONSES TO PHQ QUESTIONS 1-9: 0
SUM OF ALL RESPONSES TO PHQ QUESTIONS 1-9: 0
2. FEELING DOWN, DEPRESSED OR HOPELESS: 0
SUM OF ALL RESPONSES TO PHQ QUESTIONS 1-9: 0
SUM OF ALL RESPONSES TO PHQ9 QUESTIONS 1 & 2: 0

## 2023-08-16 ASSESSMENT — LIFESTYLE VARIABLES
HOW OFTEN DO YOU HAVE A DRINK CONTAINING ALCOHOL: NEVER
HOW MANY STANDARD DRINKS CONTAINING ALCOHOL DO YOU HAVE ON A TYPICAL DAY: PATIENT DOES NOT DRINK

## 2023-08-18 DIAGNOSIS — E11.65 TYPE 2 DIABETES MELLITUS WITH HYPERGLYCEMIA, WITH LONG-TERM CURRENT USE OF INSULIN (HCC): ICD-10-CM

## 2023-08-18 DIAGNOSIS — Z79.4 TYPE 2 DIABETES MELLITUS WITH HYPERGLYCEMIA, WITH LONG-TERM CURRENT USE OF INSULIN (HCC): ICD-10-CM

## 2023-08-21 RX ORDER — GLIMEPIRIDE 4 MG/1
4 TABLET ORAL 2 TIMES DAILY
Qty: 180 TABLET | Refills: 1 | Status: SHIPPED | OUTPATIENT
Start: 2023-08-21

## 2023-09-02 DIAGNOSIS — I10 ESSENTIAL HYPERTENSION: ICD-10-CM

## 2023-09-05 RX ORDER — CARVEDILOL 25 MG/1
TABLET ORAL
Qty: 180 TABLET | Refills: 1 | Status: SHIPPED | OUTPATIENT
Start: 2023-09-05

## 2023-09-10 DIAGNOSIS — I10 ESSENTIAL HYPERTENSION: ICD-10-CM

## 2023-09-11 RX ORDER — DOXAZOSIN MESYLATE 4 MG/1
4 TABLET ORAL NIGHTLY
Qty: 90 TABLET | Refills: 1 | Status: SHIPPED | OUTPATIENT
Start: 2023-09-11

## 2023-10-09 RX ORDER — SILDENAFIL 50 MG/1
50 TABLET, FILM COATED ORAL DAILY PRN
Qty: 90 TABLET | Refills: 1 | Status: SHIPPED | OUTPATIENT
Start: 2023-10-09

## 2023-10-15 DIAGNOSIS — K21.9 GASTROESOPHAGEAL REFLUX DISEASE, UNSPECIFIED WHETHER ESOPHAGITIS PRESENT: ICD-10-CM

## 2023-10-16 RX ORDER — BUMETANIDE 0.5 MG/1
TABLET ORAL
Qty: 30 TABLET | Refills: 0 | OUTPATIENT
Start: 2023-10-16

## 2023-10-16 RX ORDER — FAMOTIDINE 20 MG/1
20 TABLET, FILM COATED ORAL 2 TIMES DAILY
Qty: 180 TABLET | Refills: 1 | Status: SHIPPED | OUTPATIENT
Start: 2023-10-16

## 2023-10-30 RX ORDER — BUMETANIDE 0.5 MG/1
0.5 TABLET ORAL PRN
Qty: 30 TABLET | Refills: 0 | Status: SHIPPED | OUTPATIENT
Start: 2023-10-30

## 2023-11-20 DIAGNOSIS — I10 ESSENTIAL HYPERTENSION: ICD-10-CM

## 2023-11-20 RX ORDER — CANDESARTAN 32 MG/1
32 TABLET ORAL DAILY
Qty: 90 TABLET | Refills: 1 | Status: SHIPPED | OUTPATIENT
Start: 2023-11-20

## 2023-12-03 ENCOUNTER — PATIENT MESSAGE (OUTPATIENT)
Dept: FAMILY MEDICINE CLINIC | Age: 73
End: 2023-12-03

## 2023-12-03 DIAGNOSIS — E78.00 PURE HYPERCHOLESTEROLEMIA: ICD-10-CM

## 2023-12-04 RX ORDER — PRAVASTATIN SODIUM 20 MG
20 TABLET ORAL DAILY
Qty: 90 TABLET | Refills: 1 | Status: SHIPPED | OUTPATIENT
Start: 2023-12-04

## 2023-12-04 NOTE — TELEPHONE ENCOUNTER
From: Harvey Garrido  To: Dr. John Hunter: 12/3/2023 2:06 PM EST  Subject: Refill    Hi, I need a refill of Pravastatin 20 mg please.

## 2024-01-10 ENCOUNTER — OFFICE VISIT (OUTPATIENT)
Dept: FAMILY MEDICINE CLINIC | Age: 74
End: 2024-01-10
Payer: MEDICARE

## 2024-01-10 VITALS
DIASTOLIC BLOOD PRESSURE: 70 MMHG | WEIGHT: 192 LBS | RESPIRATION RATE: 14 BRPM | HEART RATE: 62 BPM | BODY MASS INDEX: 28.35 KG/M2 | TEMPERATURE: 97.8 F | SYSTOLIC BLOOD PRESSURE: 124 MMHG | OXYGEN SATURATION: 97 %

## 2024-01-10 DIAGNOSIS — J20.9 ACUTE BRONCHITIS, UNSPECIFIED ORGANISM: Primary | ICD-10-CM

## 2024-01-10 DIAGNOSIS — E11.65 TYPE 2 DIABETES MELLITUS WITH HYPERGLYCEMIA, WITH LONG-TERM CURRENT USE OF INSULIN (HCC): ICD-10-CM

## 2024-01-10 DIAGNOSIS — Z79.4 TYPE 2 DIABETES MELLITUS WITH HYPERGLYCEMIA, WITH LONG-TERM CURRENT USE OF INSULIN (HCC): ICD-10-CM

## 2024-01-10 DIAGNOSIS — R05.1 ACUTE COUGH: ICD-10-CM

## 2024-01-10 LAB
INFLUENZA A ANTIBODY: NEGATIVE
INFLUENZA B ANTIBODY: NEGATIVE
Lab: NORMAL
PERFORMING INSTRUMENT: NORMAL
QC PASS/FAIL: NORMAL
SARS-COV-2, POC: NORMAL

## 2024-01-10 PROCEDURE — 3046F HEMOGLOBIN A1C LEVEL >9.0%: CPT | Performed by: FAMILY MEDICINE

## 2024-01-10 PROCEDURE — 1036F TOBACCO NON-USER: CPT | Performed by: FAMILY MEDICINE

## 2024-01-10 PROCEDURE — 87804 INFLUENZA ASSAY W/OPTIC: CPT | Performed by: FAMILY MEDICINE

## 2024-01-10 PROCEDURE — 1123F ACP DISCUSS/DSCN MKR DOCD: CPT | Performed by: FAMILY MEDICINE

## 2024-01-10 PROCEDURE — 3017F COLORECTAL CA SCREEN DOC REV: CPT | Performed by: FAMILY MEDICINE

## 2024-01-10 PROCEDURE — G8484 FLU IMMUNIZE NO ADMIN: HCPCS | Performed by: FAMILY MEDICINE

## 2024-01-10 PROCEDURE — 87426 SARSCOV CORONAVIRUS AG IA: CPT | Performed by: FAMILY MEDICINE

## 2024-01-10 PROCEDURE — 3078F DIAST BP <80 MM HG: CPT | Performed by: FAMILY MEDICINE

## 2024-01-10 PROCEDURE — G8417 CALC BMI ABV UP PARAM F/U: HCPCS | Performed by: FAMILY MEDICINE

## 2024-01-10 PROCEDURE — G8427 DOCREV CUR MEDS BY ELIG CLIN: HCPCS | Performed by: FAMILY MEDICINE

## 2024-01-10 PROCEDURE — 3074F SYST BP LT 130 MM HG: CPT | Performed by: FAMILY MEDICINE

## 2024-01-10 PROCEDURE — 2022F DILAT RTA XM EVC RTNOPTHY: CPT | Performed by: FAMILY MEDICINE

## 2024-01-10 PROCEDURE — 99213 OFFICE O/P EST LOW 20 MIN: CPT | Performed by: FAMILY MEDICINE

## 2024-01-10 RX ORDER — METHYLPREDNISOLONE 4 MG/1
TABLET ORAL
Qty: 1 KIT | Refills: 0 | Status: SHIPPED | OUTPATIENT
Start: 2024-01-10 | End: 2024-01-16

## 2024-01-10 RX ORDER — AZITHROMYCIN 250 MG/1
TABLET, FILM COATED ORAL
Qty: 6 TABLET | Refills: 0 | Status: SHIPPED | OUTPATIENT
Start: 2024-01-10

## 2024-01-10 ASSESSMENT — PATIENT HEALTH QUESTIONNAIRE - PHQ9
SUM OF ALL RESPONSES TO PHQ QUESTIONS 1-9: 0
SUM OF ALL RESPONSES TO PHQ QUESTIONS 1-9: 0
SUM OF ALL RESPONSES TO PHQ9 QUESTIONS 1 & 2: 0
2. FEELING DOWN, DEPRESSED OR HOPELESS: 0
SUM OF ALL RESPONSES TO PHQ QUESTIONS 1-9: 0
1. LITTLE INTEREST OR PLEASURE IN DOING THINGS: 0
SUM OF ALL RESPONSES TO PHQ QUESTIONS 1-9: 0

## 2024-01-10 ASSESSMENT — ENCOUNTER SYMPTOMS
WHEEZING: 0
RHINORRHEA: 0
SHORTNESS OF BREATH: 0
COUGH: 1

## 2024-01-10 NOTE — PROGRESS NOTES
[facility]  [address]  [place] [address]  Phone:  [phone]          Name: Bahman Ruelas  : 1950    Chief Complaint   Patient presents with    Cough     X 4 days.  Taking coricidin and tylenol OTC     Congestion       HPI:     Bahman Ruelas is a 73 y.o. male who presents today for evaluation of nasal congestion, chest pain, cough, headaches, and fever for the past few days.  He's wheezing when he's laying down but he's not SOB.  He's been sleeping fine with his CPAP.  He's felt lightheaded lightheaded today.  He's been taking Coricidin HBP and Tylenol.  His BG was elevated at 160 today.  His last HbA1c was 7.5% in November.      Current Outpatient Medications:     azithromycin (ZITHROMAX Z-SCOT) 250 MG tablet, Take two (2) tablets by mouth on day 1, then take one (1) tablet by mouth daily for four (4) days., Disp: 6 tablet, Rfl: 0    methylPREDNISolone (MEDROL DOSEPACK) 4 MG tablet, Take by mouth., Disp: 1 kit, Rfl: 0    bumetanide (BUMEX) 0.5 MG tablet, Take 1 tablet by mouth as needed (swelling), Disp: 90 tablet, Rfl: 1    pravastatin (PRAVACHOL) 20 MG tablet, Take 1 tablet by mouth daily, Disp: 90 tablet, Rfl: 1    candesartan (ATACAND) 32 MG tablet, Take 1 tablet by mouth once daily, Disp: 90 tablet, Rfl: 1    famotidine (PEPCID) 20 MG tablet, Take 1 tablet by mouth twice daily, Disp: 180 tablet, Rfl: 1    sildenafil (VIAGRA) 50 MG tablet, Take 1 tablet by mouth daily as needed for Erectile Dysfunction, Disp: 90 tablet, Rfl: 1    doxazosin (CARDURA) 4 MG tablet, Take 1 tablet by mouth nightly, Disp: 90 tablet, Rfl: 1    carvedilol (COREG) 25 MG tablet, Take 1 tablet by mouth twice daily, Disp: 180 tablet, Rfl: 1    metFORMIN (GLUCOPHAGE) 500 MG tablet, Take 1 tablet by mouth every evening, Disp: 90 tablet, Rfl: 1    glimepiride (AMARYL) 4 MG tablet, Take 1 tablet by mouth in the morning and at bedtime, Disp: 180 tablet, Rfl: 1    metFORMIN (GLUCOPHAGE) 1000 MG tablet,

## 2024-01-10 NOTE — PROGRESS NOTES
Poly called  and was informed of Dr. Cohen's message regarding the culture results and resistance to MRSA. She will continue taking the Clindamycin and applying Mupirocin ointment. She did follow up with the surgeon yesterday and had the wound I&D with packing. She will continue to follow up care with the surgery department. Poly verbalized understanding.   suprapubically ). There is no rebound and no guarding. No CVA TTP   Genitourinary: Rectum normal. Rectal exam shows no external hemorrhoid, no internal hemorrhoid, no fissure, no mass, no tenderness, anal tone normal and guaiac negative stool. Prostate is enlarged (mildly; ?small firm area on L). Prostate is not tender. Lymphadenopathy:     He has no cervical adenopathy. Neurological: He is alert and oriented to person, place, and time. Skin: He is not diaphoretic. Psychiatric: He has a normal mood and affect. His behavior is normal. Judgment and thought content normal.   Nursing note and vitals reviewed. Assessment/Plan:      Diagnosis Orders   1. Urinary urgency  PSA Prostatic Specific Antigen    CBC With Auto Differential    Basic Metabolic Panel    OK MEASUREMENT,POST-VOID RESIDUAL VOLUME BY US,NON-IMAGING    PSA Prostatic Specific Antigen    CBC With Auto Differential    Basic Metabolic Panel    tamsulosin (FLOMAX) 0.4 MG capsule   2. Urinary retention  PSA Prostatic Specific Antigen    CBC With Auto Differential    Basic Metabolic Panel    OK MEASUREMENT,POST-VOID RESIDUAL VOLUME BY US,NON-IMAGING    PSA Prostatic Specific Antigen    CBC With Auto Differential    Basic Metabolic Panel    tamsulosin (FLOMAX) 0.4 MG capsule   3. Prostate nodule       Bladder scan done with no signficant retention  ?prostatitis given symptoms. Start cipro and flomax  discussed urology referral.    Patient given educational materials - see patient instructions. Discussed use, benefit, and side effects of prescribed medications. All patient questions answered. Pt voiced understanding. Reviewed health maintenance. Discussed medications, diet and exercise. Patient agreed with treatment plan. Follow up as directed. Return if symptoms worsen or fail to improve.       (Please note that portions of this note were completed with a voice recognition program. Efforts were made to edit the dictations but

## 2024-02-01 NOTE — PROGRESS NOTES
[facility]  [address]  [place] [address]  Phone:  [phone]          Name: Bahman Ruelas  : 1950    Chief Complaint   Patient presents with    Follow-up     DM2       HPI:     Bahman Ruelas is a 73 y.o. male who presents today for follow up of T2DM, hypertension, and hyperlipidemia.  His last HbA1c was 7.5% in November.  He's following with Dr. Booker who recommended he go off HCTZ.  He was on it to help with LE edema but has had some veins stripped [facility] that has helped.  He also wears compression stockings.      He's been frustrated with his BGs.  Last night is was 120 and this AM it was 130.  A few nights ago he went to bed in the mid 100s and woke up in the 220s.  Many BGs are in the upper 100s.    He's been under some stress as his WILMAN is on hospice at home.      Lab Results   Component Value Date    CHOL 119 2023    TRIG 89 2023    HDL 34 2023    LDLCALC 67 2023     Lab Results   Component Value Date    ALT 10 (L) 2023    AST 10 2023        Lab Results   Component Value Date/Time     2023 08:43 AM    K 4.6 2023 08:43 AM     2023 08:43 AM    CO2 27 2023 08:43 AM    BUN 19 2023 08:43 AM    CREATININE 1.1 2023 08:43 AM    GLUCOSE 137 2023 09:39 AM    CALCIUM 10.9 2023 09:39 AM          Current Outpatient Medications:     Dulaglutide 3 MG/0.5ML SOPN, Inject 3 mg into the skin once a week, Disp: 4 Adjustable Dose Pre-filled Pen Syringe, Rfl: 2    bumetanide (BUMEX) 0.5 MG tablet, Take 1 tablet by mouth as needed (swelling), Disp: 90 tablet, Rfl: 1    pravastatin (PRAVACHOL) 20 MG tablet, Take 1 tablet by mouth daily, Disp: 90 tablet, Rfl: 1    candesartan (ATACAND) 32 MG tablet, Take 1 tablet by mouth once daily, Disp: 90 tablet, Rfl: 1    famotidine (PEPCID) 20 MG tablet, Take 1 tablet by mouth twice daily, Disp: 180 tablet, Rfl: 1    sildenafil (VIAGRA)

## 2024-02-05 ENCOUNTER — OFFICE VISIT (OUTPATIENT)
Dept: FAMILY MEDICINE CLINIC | Age: 74
End: 2024-02-05

## 2024-02-05 VITALS
HEART RATE: 67 BPM | SYSTOLIC BLOOD PRESSURE: 120 MMHG | RESPIRATION RATE: 16 BRPM | HEIGHT: 69 IN | BODY MASS INDEX: 27.99 KG/M2 | WEIGHT: 189 LBS | TEMPERATURE: 97.4 F | OXYGEN SATURATION: 95 % | DIASTOLIC BLOOD PRESSURE: 62 MMHG

## 2024-02-05 DIAGNOSIS — K21.9 GASTROESOPHAGEAL REFLUX DISEASE, UNSPECIFIED WHETHER ESOPHAGITIS PRESENT: ICD-10-CM

## 2024-02-05 DIAGNOSIS — E11.65 TYPE 2 DIABETES MELLITUS WITH HYPERGLYCEMIA, WITH LONG-TERM CURRENT USE OF INSULIN (HCC): Primary | ICD-10-CM

## 2024-02-05 DIAGNOSIS — E78.00 PURE HYPERCHOLESTEROLEMIA: ICD-10-CM

## 2024-02-05 DIAGNOSIS — R09.89 PHLEGM IN THROAT: ICD-10-CM

## 2024-02-05 DIAGNOSIS — I10 ESSENTIAL HYPERTENSION: ICD-10-CM

## 2024-02-05 DIAGNOSIS — Z79.4 TYPE 2 DIABETES MELLITUS WITH HYPERGLYCEMIA, WITH LONG-TERM CURRENT USE OF INSULIN (HCC): Primary | ICD-10-CM

## 2024-02-05 ASSESSMENT — ENCOUNTER SYMPTOMS: VOICE CHANGE: 1

## 2024-02-14 DIAGNOSIS — Z79.4 TYPE 2 DIABETES MELLITUS WITH HYPERGLYCEMIA, WITH LONG-TERM CURRENT USE OF INSULIN (HCC): ICD-10-CM

## 2024-02-14 DIAGNOSIS — E11.65 TYPE 2 DIABETES MELLITUS WITH HYPERGLYCEMIA, WITH LONG-TERM CURRENT USE OF INSULIN (HCC): ICD-10-CM

## 2024-02-15 RX ORDER — GLIMEPIRIDE 4 MG/1
4 TABLET ORAL 2 TIMES DAILY
Qty: 180 TABLET | Refills: 1 | Status: SHIPPED | OUTPATIENT
Start: 2024-02-15

## 2024-03-03 DIAGNOSIS — I10 ESSENTIAL HYPERTENSION: ICD-10-CM

## 2024-03-04 RX ORDER — CARVEDILOL 25 MG/1
TABLET ORAL
Qty: 180 TABLET | Refills: 1 | Status: SHIPPED | OUTPATIENT
Start: 2024-03-04

## 2024-03-07 ENCOUNTER — OFFICE VISIT (OUTPATIENT)
Dept: PULMONOLOGY | Age: 74
End: 2024-03-07
Payer: MEDICARE

## 2024-03-07 VITALS
SYSTOLIC BLOOD PRESSURE: 132 MMHG | DIASTOLIC BLOOD PRESSURE: 78 MMHG | HEART RATE: 66 BPM | OXYGEN SATURATION: 97 % | HEIGHT: 70 IN | TEMPERATURE: 97.5 F | BODY MASS INDEX: 27.69 KG/M2 | WEIGHT: 193.4 LBS

## 2024-03-07 DIAGNOSIS — G47.33 OSA TREATED WITH BIPAP: Primary | ICD-10-CM

## 2024-03-07 PROCEDURE — 3075F SYST BP GE 130 - 139MM HG: CPT | Performed by: NURSE PRACTITIONER

## 2024-03-07 PROCEDURE — G8417 CALC BMI ABV UP PARAM F/U: HCPCS | Performed by: NURSE PRACTITIONER

## 2024-03-07 PROCEDURE — 3017F COLORECTAL CA SCREEN DOC REV: CPT | Performed by: NURSE PRACTITIONER

## 2024-03-07 PROCEDURE — 99214 OFFICE O/P EST MOD 30 MIN: CPT | Performed by: NURSE PRACTITIONER

## 2024-03-07 PROCEDURE — 1036F TOBACCO NON-USER: CPT | Performed by: NURSE PRACTITIONER

## 2024-03-07 PROCEDURE — G8484 FLU IMMUNIZE NO ADMIN: HCPCS | Performed by: NURSE PRACTITIONER

## 2024-03-07 PROCEDURE — 1123F ACP DISCUSS/DSCN MKR DOCD: CPT | Performed by: NURSE PRACTITIONER

## 2024-03-07 PROCEDURE — G8427 DOCREV CUR MEDS BY ELIG CLIN: HCPCS | Performed by: NURSE PRACTITIONER

## 2024-03-07 PROCEDURE — 3078F DIAST BP <80 MM HG: CPT | Performed by: NURSE PRACTITIONER

## 2024-03-07 ASSESSMENT — ENCOUNTER SYMPTOMS: BACK PAIN: 1

## 2024-03-07 NOTE — PROGRESS NOTES
[facility]      Bahman Ruelas         [id number]  3/7/2024   Chief Complaint   Patient presents with    Follow-up     1 yr sampson         Pt of Dr. lodnono    PAP Download:   Original or initial AHI: 28.7     Date of initial study: 8.19.15      Compliant  97%     Noncompliant 0 %     PAP Type spont Level  19/15   Avg Hrs/Day 7 hr 33min  AHI: 0.7   Leaks : 95 th percentile: 22.2   Recorded compliance dates , 2.6.24  to 3.6.24   Machine/Mfg:   [x] ResMed    [] Respironics/Dreamstation   Interface:   [] Nasal    [] Nasal pillows   [x] FFM      Provider:      [x] [facility]     [][facility]     [] [employer]    [] [facility]    [] [facility]               [] [facility]      [] [employer]    [] [place]:      [] Other    Neck Size: 16  Mallampati 4  ESS:  8  SAQLI: 82    Here is a scan of the most recent download:                  Presentation:   Bahman presents for 1 yearsleep medicine follow up for obstructive sleep apnea  Since the last visit, Bahman  Having issues with current machine., code of motor life exceeded. Sometimes has to push on and off button several times to get it to work.   Continues to feel benefit from his BIPAP but has some anxiety about the machine feeling like it maybe not working properly   Has been waking up with some pain across his shoulder blades     He is frustrated with this process, he was supposed to see me in Jan, he came in December and was going to be able to see me early but reports as soon as they asked the spot got filled and states someone told him we could just place the BIPAP order without seeing him .   There is no documentation of this conversation .   I had to cancel his appt in January due to being out of office for conference   He messaged via my chart asking why his order was not faxed, this was the first I had heard of this. Order was placed, since I had seen him within last 6 months, however he is here today needing F2F per Medicare to be

## 2024-03-11 DIAGNOSIS — I10 ESSENTIAL HYPERTENSION: ICD-10-CM

## 2024-03-11 RX ORDER — DOXAZOSIN MESYLATE 4 MG/1
4 TABLET ORAL NIGHTLY
Qty: 90 TABLET | Refills: 1 | Status: SHIPPED | OUTPATIENT
Start: 2024-03-11

## 2024-03-11 NOTE — TELEPHONE ENCOUNTER
Last visit- 2/5/2024  Next visit- Visit date not found    Requested Prescriptions     Pending Prescriptions Disp Refills    doxazosin (CARDURA) 4 MG tablet 90 tablet 1     Sig: Take 1 tablet by mouth nightly

## 2024-03-21 NOTE — PROGRESS NOTES
[facility] [place] [facility]  [facility]  [address]  [address]  [facility] [address]  Dept: [phone]  Dept Fax: [phone]  Loc: [phone]    Bahman Ruelas is a 74 y.o. male who was referred by Yasemin Bonilla MD for:  Chief Complaint   Patient presents with    New Patient     Patient is here as a new patient for phlegm in throat. Patient states he has post nasal drainage that starts to sit in the back of his throat and makes it uncomfortable for him to swallow and states that he is constantly clearing his throat.     .    HPI:     Bahman Ruleas presents today for concern for phlegm in his throat.  Patient was referred by Yasemin Bonilla MD; notes reviewed.  Patient states he constantly feels like there is a 'ball of mucous' in his throat that is hanging up there that has been ongoing the last several months. He endorses having been on multiple courses of antibiotics by PCP without resolution in symptoms. Patient endorses clearing his throat constantly throughout the day/night. He reports his sensation is significantly worse after eating food and possibly in the morning. Patient denies any difficulty with chewing/swallowing. No reports of hemoptysis or throat pain. Patient denies history of previous sinus infections/sinus pressure or pain. He states sometimes his nose is congested however denies nasal obstructive symptoms. Patient reports history of smoking MANY years ago. He states he does have history of GERD and takes BID Pepcid at this time. He states he has been burping/belching more than normal the last several months as well. Patient does have history of LEW on Bipap which he reports tolerating well. Wife states she recently had Lynx procedure done and endorses patient drinks several cups of coffee and pop throughout the day and is a snacker. No concern for change to vocal quality reported by patient at this time.     Subjective:

## 2024-03-22 ENCOUNTER — OFFICE VISIT (OUTPATIENT)
Dept: ENT CLINIC | Age: 74
End: 2024-03-22
Payer: MEDICARE

## 2024-03-22 VITALS
RESPIRATION RATE: 18 BRPM | HEIGHT: 70 IN | OXYGEN SATURATION: 96 % | SYSTOLIC BLOOD PRESSURE: 138 MMHG | TEMPERATURE: 97.6 F | HEART RATE: 66 BPM | WEIGHT: 191.2 LBS | DIASTOLIC BLOOD PRESSURE: 82 MMHG | BODY MASS INDEX: 27.37 KG/M2

## 2024-03-22 DIAGNOSIS — R09.89 THROAT CLEARING: ICD-10-CM

## 2024-03-22 DIAGNOSIS — K21.9 GASTROESOPHAGEAL REFLUX DISEASE, UNSPECIFIED WHETHER ESOPHAGITIS PRESENT: Primary | ICD-10-CM

## 2024-03-22 PROCEDURE — 3075F SYST BP GE 130 - 139MM HG: CPT | Performed by: REGISTERED NURSE

## 2024-03-22 PROCEDURE — 1123F ACP DISCUSS/DSCN MKR DOCD: CPT | Performed by: REGISTERED NURSE

## 2024-03-22 PROCEDURE — 3079F DIAST BP 80-89 MM HG: CPT | Performed by: REGISTERED NURSE

## 2024-03-22 PROCEDURE — G8484 FLU IMMUNIZE NO ADMIN: HCPCS | Performed by: REGISTERED NURSE

## 2024-03-22 PROCEDURE — 1036F TOBACCO NON-USER: CPT | Performed by: REGISTERED NURSE

## 2024-03-22 PROCEDURE — G8417 CALC BMI ABV UP PARAM F/U: HCPCS | Performed by: REGISTERED NURSE

## 2024-03-22 PROCEDURE — 3017F COLORECTAL CA SCREEN DOC REV: CPT | Performed by: REGISTERED NURSE

## 2024-03-22 PROCEDURE — 99204 OFFICE O/P NEW MOD 45 MIN: CPT | Performed by: REGISTERED NURSE

## 2024-03-22 PROCEDURE — G8427 DOCREV CUR MEDS BY ELIG CLIN: HCPCS | Performed by: REGISTERED NURSE

## 2024-03-22 RX ORDER — PANTOPRAZOLE SODIUM 40 MG/1
40 TABLET, DELAYED RELEASE ORAL
Qty: 90 TABLET | Refills: 0 | Status: SHIPPED | OUTPATIENT
Start: 2024-03-22

## 2024-03-22 NOTE — PATIENT INSTRUCTIONS
Continue your pepcid 20 mg BID as instructed by PCP  Add Protonix one tablet in the morning before eating anything    Avoid the \"5 C's\" - Caffeine, Carbonation, Citrus, Chocolate and Cocktails (alcohol, including beer).   Do not eat within 2-3 hours of lying down for the night.  Elevate head of bed.      Follow up in 2-3 months with physician for consideration of laryngoscopy if symptoms persist

## 2024-04-05 ENCOUNTER — NURSE ONLY (OUTPATIENT)
Dept: LAB | Age: 74
End: 2024-04-05

## 2024-04-05 LAB
CALCIUM SERPL-MCNC: 10.5 MG/DL (ref 8.5–10.5)
CHOLEST SERPL-MCNC: 142 MG/DL (ref 100–199)
CORTIS SERPL-MCNC: 14.33 UG/DL
CORTISOL COLLECTION INFO: NORMAL
CREAT UR-MCNC: 192 MG/DL
DEPRECATED MEAN GLUCOSE BLD GHB EST-ACNC: 195 MG/DL (ref 70–126)
GLUCOSE SERPL-MCNC: 202 MG/DL (ref 70–108)
HBA1C MFR BLD HPLC: 8.5 % (ref 4.4–6.4)
HDLC SERPL-MCNC: 35 MG/DL
LDLC SERPL CALC-MCNC: 75 MG/DL
MICROALBUMIN UR-MCNC: < 1.2 MG/DL
MICROALBUMIN/CREAT RATIO PNL UR: 6 MG/G (ref 0–30)
PHOSPHATE SERPL-MCNC: 2.8 MG/DL (ref 2.4–4.7)
TRIGL SERPL-MCNC: 159 MG/DL (ref 0–199)

## 2024-04-08 DIAGNOSIS — K21.9 GASTROESOPHAGEAL REFLUX DISEASE, UNSPECIFIED WHETHER ESOPHAGITIS PRESENT: ICD-10-CM

## 2024-04-08 LAB — VIT A SERPL-MCNC: NORMAL UG/ML

## 2024-04-08 RX ORDER — FAMOTIDINE 20 MG/1
20 TABLET, FILM COATED ORAL 2 TIMES DAILY
Qty: 180 TABLET | Refills: 1 | Status: SHIPPED | OUTPATIENT
Start: 2024-04-08

## 2024-04-08 NOTE — TELEPHONE ENCOUNTER
Last visit- 2/5/2024  Next visit- Visit date not found    Requested Prescriptions     Pending Prescriptions Disp Refills    famotidine (PEPCID) 20 MG tablet 180 tablet 1     Sig: Take 1 tablet by mouth 2 times daily

## 2024-05-28 DIAGNOSIS — E78.00 PURE HYPERCHOLESTEROLEMIA: ICD-10-CM

## 2024-05-28 RX ORDER — PRAVASTATIN SODIUM 20 MG
20 TABLET ORAL DAILY
Qty: 90 TABLET | Refills: 0 | Status: SHIPPED | OUTPATIENT
Start: 2024-05-28

## 2024-05-28 NOTE — TELEPHONE ENCOUNTER
Last visit- 2/5/2024  Next visit- Visit date not found    Requested Prescriptions     Pending Prescriptions Disp Refills    pravastatin (PRAVACHOL) 20 MG tablet [Pharmacy Med Name: Pravastatin Sodium 20 MG Oral Tablet] 90 tablet 0     Sig: Take 1 tablet by mouth once daily

## 2024-06-11 DIAGNOSIS — K21.9 GASTROESOPHAGEAL REFLUX DISEASE, UNSPECIFIED WHETHER ESOPHAGITIS PRESENT: ICD-10-CM

## 2024-06-11 DIAGNOSIS — R09.89 THROAT CLEARING: ICD-10-CM

## 2024-06-11 RX ORDER — PANTOPRAZOLE SODIUM 40 MG/1
TABLET, DELAYED RELEASE ORAL
Qty: 90 TABLET | Refills: 0 | Status: SHIPPED | OUTPATIENT
Start: 2024-06-11

## 2024-06-11 NOTE — TELEPHONE ENCOUNTER
Patient requesting refill for protonix; refill placed. Patient was to have follow up with physician after my initial visit with him in the next 2-3 months for consideration of scope however I do not see appointment scheduled. Can we review/schedule with patient please.

## 2024-06-12 NOTE — PROGRESS NOTES
download    Information added by my medical assistant/LPN was reviewed today    billing based on medical decision making     OMID Serrano-DIONNE   6/13/2024

## 2024-06-13 ENCOUNTER — OFFICE VISIT (OUTPATIENT)
Dept: PULMONOLOGY | Age: 74
End: 2024-06-13
Payer: MEDICARE

## 2024-06-13 VITALS
BODY MASS INDEX: 26.34 KG/M2 | WEIGHT: 184 LBS | OXYGEN SATURATION: 97 % | HEART RATE: 65 BPM | DIASTOLIC BLOOD PRESSURE: 62 MMHG | HEIGHT: 70 IN | SYSTOLIC BLOOD PRESSURE: 112 MMHG | TEMPERATURE: 97.7 F

## 2024-06-13 DIAGNOSIS — G47.33 OSA TREATED WITH BIPAP: Primary | ICD-10-CM

## 2024-06-13 PROCEDURE — G8417 CALC BMI ABV UP PARAM F/U: HCPCS | Performed by: NURSE PRACTITIONER

## 2024-06-13 PROCEDURE — 1036F TOBACCO NON-USER: CPT | Performed by: NURSE PRACTITIONER

## 2024-06-13 PROCEDURE — 1123F ACP DISCUSS/DSCN MKR DOCD: CPT | Performed by: NURSE PRACTITIONER

## 2024-06-13 PROCEDURE — 3017F COLORECTAL CA SCREEN DOC REV: CPT | Performed by: NURSE PRACTITIONER

## 2024-06-13 PROCEDURE — 3074F SYST BP LT 130 MM HG: CPT | Performed by: NURSE PRACTITIONER

## 2024-06-13 PROCEDURE — 3078F DIAST BP <80 MM HG: CPT | Performed by: NURSE PRACTITIONER

## 2024-06-13 PROCEDURE — 99214 OFFICE O/P EST MOD 30 MIN: CPT | Performed by: NURSE PRACTITIONER

## 2024-06-13 PROCEDURE — G8427 DOCREV CUR MEDS BY ELIG CLIN: HCPCS | Performed by: NURSE PRACTITIONER

## 2024-06-13 RX ORDER — TIRZEPATIDE 7.5 MG/.5ML
7.5 INJECTION, SOLUTION SUBCUTANEOUS WEEKLY
COMMUNITY
Start: 2024-05-24

## 2024-06-13 RX ORDER — BUMETANIDE 0.5 MG/1
TABLET ORAL
Qty: 90 TABLET | Refills: 1 | Status: SHIPPED | OUTPATIENT
Start: 2024-06-13

## 2024-06-13 ASSESSMENT — ENCOUNTER SYMPTOMS
ALLERGIC/IMMUNOLOGIC NEGATIVE: 1
RESPIRATORY NEGATIVE: 1

## 2024-06-24 ENCOUNTER — TELEPHONE (OUTPATIENT)
Dept: FAMILY MEDICINE CLINIC | Age: 74
End: 2024-06-24

## 2024-06-24 ASSESSMENT — ENCOUNTER SYMPTOMS
COUGH: 0
SHORTNESS OF BREATH: 0

## 2024-06-24 NOTE — PROGRESS NOTES
[facility]  [address]  [place] [address]  Phone:  [phone]          Name: Bahman Ruelas  : 1950    Chief Complaint   Patient presents with    Blood Pressure Check       HPI:     Bahman Ruelas is a 74 y.o. male who presents today for evaluation of hypotension.  A few days ago he was outside and bent over and felt dizzy.  When he stood up he felt like he was going to pass out.  He checked his BP and it was 94/48.  He was on Candisartan 32 mg, Coreg 25 mg BID, and Bumex 0.5 mg every other day, but he stopped the Coreg a few days ago.  His echocardiogram in  was WNL.  He has lost about 10 lbs since being on Mounjaro.      Current Outpatient Medications:     bumetanide (BUMEX) 0.5 MG tablet, TAKE 1 TABLET BY MOUTH ONCE DAILY AS NEEDED FOR SWELLING, Disp: 90 tablet, Rfl: 1    MOUNJARO 7.5 MG/0.5ML SOPN SC injection, Inject 0.5 mLs into the skin once a week, Disp: , Rfl:     pantoprazole (PROTONIX) 40 MG tablet, TAKE 1 TABLET BY MOUTH ONCE DAILY IN THE MORNING BEFORE BREAKFAST, Disp: 90 tablet, Rfl: 0    pravastatin (PRAVACHOL) 20 MG tablet, Take 1 tablet by mouth once daily, Disp: 90 tablet, Rfl: 0    famotidine (PEPCID) 20 MG tablet, Take 1 tablet by mouth 2 times daily, Disp: 180 tablet, Rfl: 1    doxazosin (CARDURA) 4 MG tablet, Take 1 tablet by mouth nightly, Disp: 90 tablet, Rfl: 1    carvedilol (COREG) 25 MG tablet, Take 1 tablet by mouth twice daily, Disp: 180 tablet, Rfl: 1    metFORMIN (GLUCOPHAGE) 500 MG tablet, Take 1 tablet by mouth every evening, Disp: 90 tablet, Rfl: 1    glimepiride (AMARYL) 4 MG tablet, Take 1 tablet by mouth in the morning and at bedtime, Disp: 180 tablet, Rfl: 1    metFORMIN (GLUCOPHAGE) 1000 MG tablet, Take 1 tablet by mouth 2 times daily (with meals), Disp: 180 tablet, Rfl: 1    candesartan (ATACAND) 32 MG tablet, Take 1 tablet by mouth once daily, Disp: 90 tablet, Rfl: 1    sildenafil (VIAGRA) 50 MG tablet, Take 1 tablet by mouth daily

## 2024-06-24 NOTE — TELEPHONE ENCOUNTER
Patient called reporting that his BP has been running low 94/48   Pt has not contacted cardiologist.  No apts to day  offered nurse BP check today or to call cardiologist.  Pt said \" ill talk to you later bye\" and then hung up the phone

## 2024-06-24 NOTE — TELEPHONE ENCOUNTER
Pt called this AM looking for an appt to be seen regarding his BP. He stated his BP has been running low, 98/44, and he has been feeling dizzy and also like he is going to pass out. Schedules are full the next few days. He was offered a nurse visit to check his BP and declined. His wife called back and left a message again asking for the pt to be seen. She would like his seen ASAP. Please advise on where to schedule.

## 2024-06-26 ENCOUNTER — OFFICE VISIT (OUTPATIENT)
Dept: FAMILY MEDICINE CLINIC | Age: 74
End: 2024-06-26

## 2024-06-26 VITALS
OXYGEN SATURATION: 98 % | SYSTOLIC BLOOD PRESSURE: 102 MMHG | HEART RATE: 72 BPM | BODY MASS INDEX: 26.66 KG/M2 | TEMPERATURE: 97.4 F | RESPIRATION RATE: 17 BRPM | DIASTOLIC BLOOD PRESSURE: 60 MMHG | WEIGHT: 186.2 LBS | HEIGHT: 70 IN

## 2024-06-26 DIAGNOSIS — I10 ESSENTIAL HYPERTENSION: ICD-10-CM

## 2024-06-26 DIAGNOSIS — I95.1 ORTHOSTATIC HYPOTENSION: Primary | ICD-10-CM

## 2024-06-26 SDOH — ECONOMIC STABILITY: HOUSING INSECURITY
IN THE LAST 12 MONTHS, WAS THERE A TIME WHEN YOU DID NOT HAVE A STEADY PLACE TO SLEEP OR SLEPT IN A SHELTER (INCLUDING NOW)?: PATIENT DECLINED

## 2024-06-26 SDOH — ECONOMIC STABILITY: FOOD INSECURITY: WITHIN THE PAST 12 MONTHS, YOU WORRIED THAT YOUR FOOD WOULD RUN OUT BEFORE YOU GOT MONEY TO BUY MORE.: PATIENT DECLINED

## 2024-06-26 SDOH — ECONOMIC STABILITY: FOOD INSECURITY: WITHIN THE PAST 12 MONTHS, THE FOOD YOU BOUGHT JUST DIDN'T LAST AND YOU DIDN'T HAVE MONEY TO GET MORE.: PATIENT DECLINED

## 2024-06-26 SDOH — ECONOMIC STABILITY: INCOME INSECURITY: HOW HARD IS IT FOR YOU TO PAY FOR THE VERY BASICS LIKE FOOD, HOUSING, MEDICAL CARE, AND HEATING?: PATIENT DECLINED

## 2024-07-03 ENCOUNTER — NURSE ONLY (OUTPATIENT)
Dept: LAB | Age: 74
End: 2024-07-03

## 2024-07-03 LAB
25(OH)D3 SERPL-MCNC: 35 NG/ML (ref 30–100)
ALBUMIN SERPL BCG-MCNC: 4 G/DL (ref 3.5–5.1)
ALP SERPL-CCNC: 59 U/L (ref 38–126)
ALT SERPL W/O P-5'-P-CCNC: 12 U/L (ref 11–66)
ANION GAP SERPL CALC-SCNC: 9 MEQ/L (ref 8–16)
AST SERPL-CCNC: 12 U/L (ref 5–40)
BILIRUB SERPL-MCNC: 0.4 MG/DL (ref 0.3–1.2)
BUN SERPL-MCNC: 15 MG/DL (ref 7–22)
CALCIUM SERPL-MCNC: 10.6 MG/DL (ref 8.5–10.5)
CHLORIDE SERPL-SCNC: 103 MEQ/L (ref 98–111)
CHOLEST SERPL-MCNC: 102 MG/DL (ref 100–199)
CO2 SERPL-SCNC: 29 MEQ/L (ref 23–33)
CREAT SERPL-MCNC: 1 MG/DL (ref 0.4–1.2)
CREAT UR-MCNC: 103.8 MG/DL
DEPRECATED MEAN GLUCOSE BLD GHB EST-ACNC: 168 MG/DL (ref 70–126)
GFR SERPL CREATININE-BSD FRML MDRD: 79 ML/MIN/1.73M2
GLUCOSE SERPL-MCNC: 135 MG/DL (ref 70–108)
HBA1C MFR BLD HPLC: 7.6 % (ref 4.4–6.4)
HDLC SERPL-MCNC: 35 MG/DL
LDLC SERPL CALC-MCNC: 51 MG/DL
MICROALBUMIN UR-MCNC: < 1.2 MG/DL
MICROALBUMIN/CREAT RATIO PNL UR: 12 MG/G (ref 0–30)
PHOSPHATE SERPL-MCNC: 2.7 MG/DL (ref 2.4–4.7)
POTASSIUM SERPL-SCNC: 4.6 MEQ/L (ref 3.5–5.2)
PROT SERPL-MCNC: 6.2 G/DL (ref 6.1–8)
PTH-INTACT SERPL-MCNC: 44.4 PG/ML (ref 15–65)
SODIUM SERPL-SCNC: 141 MEQ/L (ref 135–145)
TRIGL SERPL-MCNC: 80 MG/DL (ref 0–199)

## 2024-07-28 DIAGNOSIS — I10 ESSENTIAL HYPERTENSION: ICD-10-CM

## 2024-07-29 RX ORDER — CANDESARTAN 32 MG/1
32 TABLET ORAL DAILY
Qty: 90 TABLET | Refills: 0 | Status: SHIPPED | OUTPATIENT
Start: 2024-07-29

## 2024-08-09 DIAGNOSIS — Z79.4 TYPE 2 DIABETES MELLITUS WITH HYPERGLYCEMIA, WITH LONG-TERM CURRENT USE OF INSULIN (HCC): ICD-10-CM

## 2024-08-09 DIAGNOSIS — E11.65 TYPE 2 DIABETES MELLITUS WITH HYPERGLYCEMIA, WITH LONG-TERM CURRENT USE OF INSULIN (HCC): ICD-10-CM

## 2024-08-10 DIAGNOSIS — E11.65 TYPE 2 DIABETES MELLITUS WITH HYPERGLYCEMIA, WITH LONG-TERM CURRENT USE OF INSULIN (HCC): ICD-10-CM

## 2024-08-10 DIAGNOSIS — Z79.4 TYPE 2 DIABETES MELLITUS WITH HYPERGLYCEMIA, WITH LONG-TERM CURRENT USE OF INSULIN (HCC): ICD-10-CM

## 2024-08-12 RX ORDER — GLIMEPIRIDE 4 MG/1
4 TABLET ORAL 2 TIMES DAILY
Qty: 180 TABLET | Refills: 0 | Status: SHIPPED | OUTPATIENT
Start: 2024-08-12

## 2024-08-12 NOTE — TELEPHONE ENCOUNTER
Last visit- 6/26/2024  Next visit- Visit date not found    Requested Prescriptions     Pending Prescriptions Disp Refills    metFORMIN (GLUCOPHAGE) 1000 MG tablet [Pharmacy Med Name: metFORMIN HCl 1000 MG Oral Tablet] 180 tablet 0     Sig: TAKE 1 TABLET BY MOUTH TWICE DAILY WITH MEALS    glimepiride (AMARYL) 4 MG tablet [Pharmacy Med Name: Glimepiride 4 MG Oral Tablet] 180 tablet 0     Sig: TAKE 1 TABLET BY MOUTH IN THE MORNING AND AT BEDTIME

## 2024-08-28 DIAGNOSIS — E78.00 PURE HYPERCHOLESTEROLEMIA: ICD-10-CM

## 2024-08-28 RX ORDER — PRAVASTATIN SODIUM 20 MG
20 TABLET ORAL DAILY
Qty: 90 TABLET | Refills: 0 | Status: SHIPPED | OUTPATIENT
Start: 2024-08-28

## 2024-08-29 DIAGNOSIS — I10 ESSENTIAL HYPERTENSION: ICD-10-CM

## 2024-08-29 RX ORDER — DOXAZOSIN 4 MG/1
4 TABLET ORAL NIGHTLY
Qty: 90 TABLET | Refills: 1 | Status: SHIPPED | OUTPATIENT
Start: 2024-08-29

## 2024-09-10 DIAGNOSIS — K21.9 GASTROESOPHAGEAL REFLUX DISEASE, UNSPECIFIED WHETHER ESOPHAGITIS PRESENT: ICD-10-CM

## 2024-09-10 DIAGNOSIS — R09.89 THROAT CLEARING: ICD-10-CM

## 2024-09-10 RX ORDER — PANTOPRAZOLE SODIUM 40 MG/1
TABLET, DELAYED RELEASE ORAL
Qty: 90 TABLET | Refills: 0 | Status: SHIPPED | OUTPATIENT
Start: 2024-09-10

## 2024-09-18 ENCOUNTER — OFFICE VISIT (OUTPATIENT)
Dept: FAMILY MEDICINE CLINIC | Age: 74
End: 2024-09-18

## 2024-09-18 VITALS
DIASTOLIC BLOOD PRESSURE: 78 MMHG | RESPIRATION RATE: 16 BRPM | HEART RATE: 73 BPM | BODY MASS INDEX: 26.83 KG/M2 | TEMPERATURE: 98.4 F | OXYGEN SATURATION: 97 % | SYSTOLIC BLOOD PRESSURE: 138 MMHG | WEIGHT: 187.4 LBS | HEIGHT: 70 IN

## 2024-09-18 DIAGNOSIS — B96.89 ACUTE BACTERIAL SINUSITIS: ICD-10-CM

## 2024-09-18 DIAGNOSIS — Z00.00 ENCOUNTER FOR MEDICARE ANNUAL WELLNESS EXAM: Primary | ICD-10-CM

## 2024-09-18 DIAGNOSIS — J01.90 ACUTE BACTERIAL SINUSITIS: ICD-10-CM

## 2024-09-18 RX ORDER — CEFDINIR 300 MG/1
300 CAPSULE ORAL 2 TIMES DAILY
Qty: 14 CAPSULE | Refills: 0 | Status: SHIPPED | OUTPATIENT
Start: 2024-09-18 | End: 2024-09-25

## 2024-09-18 ASSESSMENT — PATIENT HEALTH QUESTIONNAIRE - PHQ9
SUM OF ALL RESPONSES TO PHQ QUESTIONS 1-9: 0
SUM OF ALL RESPONSES TO PHQ QUESTIONS 1-9: 0
SUM OF ALL RESPONSES TO PHQ9 QUESTIONS 1 & 2: 0
SUM OF ALL RESPONSES TO PHQ QUESTIONS 1-9: 0
SUM OF ALL RESPONSES TO PHQ QUESTIONS 1-9: 0
2. FEELING DOWN, DEPRESSED OR HOPELESS: NOT AT ALL
1. LITTLE INTEREST OR PLEASURE IN DOING THINGS: NOT AT ALL

## 2024-09-18 ASSESSMENT — LIFESTYLE VARIABLES
HOW MANY STANDARD DRINKS CONTAINING ALCOHOL DO YOU HAVE ON A TYPICAL DAY: PATIENT DOES NOT DRINK
HOW OFTEN DO YOU HAVE A DRINK CONTAINING ALCOHOL: NEVER

## 2024-10-02 DIAGNOSIS — K21.9 GASTROESOPHAGEAL REFLUX DISEASE, UNSPECIFIED WHETHER ESOPHAGITIS PRESENT: ICD-10-CM

## 2024-10-02 RX ORDER — FAMOTIDINE 20 MG/1
20 TABLET, FILM COATED ORAL 2 TIMES DAILY
Qty: 180 TABLET | Refills: 1 | Status: SHIPPED | OUTPATIENT
Start: 2024-10-02

## 2024-10-11 ENCOUNTER — OFFICE VISIT (OUTPATIENT)
Dept: ENT CLINIC | Age: 74
End: 2024-10-11

## 2024-10-11 VITALS
BODY MASS INDEX: 26.76 KG/M2 | DIASTOLIC BLOOD PRESSURE: 76 MMHG | OXYGEN SATURATION: 98 % | WEIGHT: 186.9 LBS | HEART RATE: 55 BPM | HEIGHT: 70 IN | SYSTOLIC BLOOD PRESSURE: 154 MMHG | TEMPERATURE: 97.1 F

## 2024-10-11 DIAGNOSIS — K21.9 LARYNGOPHARYNGEAL REFLUX DISEASE: ICD-10-CM

## 2024-10-11 DIAGNOSIS — T46.5X5A ADVERSE EFFECT OF ANGIOTENSIN 2 RECEPTOR ANTAGONIST, INITIAL ENCOUNTER: Primary | ICD-10-CM

## 2024-10-11 DIAGNOSIS — R09.89 CHRONIC THROAT CLEARING: ICD-10-CM

## 2024-10-11 DIAGNOSIS — J38.4 LARYNGEAL EDEMA DETERMINED BY LARYNGOSCOPY: ICD-10-CM

## 2024-10-11 DIAGNOSIS — R09.A2 SENSATION OF FOREIGN BODY IN LARYNX: ICD-10-CM

## 2024-10-11 RX ORDER — TRIAMCINOLONE ACETONIDE 0.25 MG/G
CREAM TOPICAL
COMMUNITY
Start: 2024-10-02

## 2024-10-11 NOTE — PROGRESS NOTES
[facility] [place] [facility]  [facility]  [address]  [address]  [facility] [address]  Dept: [phone]  Dept Fax: [phone]  Loc: [phone]    Bahman Ruelas is a 74 y.o. male who was referred by No ref. provider found for:  Chief Complaint   Patient presents with    Other     Patient here for a scope per Kelly. Gastroesophageal reflux. Patient states he has to clear his throat a lot, has been on antibiotics multiple times since last visit just for esophagitis. Patient states that when he sits down for  a while his starts to feel pressure and sometimes with the phlegm he loses his voice   .    HPI:     Bahman Ruelas is a 74 y.o. male who presents today for follow-up on his throat issues.  His foreign body sensation chronic throat clearing.  He is on an angiotensin blood pressure drug.    History:     Allergies   Allergen Reactions    Crestor [Rosuvastatin]      Muscle aches    Lipitor [Atorvastatin]      Back ache    Amlodipine Swelling     Leg swelling     Current Outpatient Medications   Medication Sig Dispense Refill    triamcinolone (KENALOG) 0.025 % cream APPLY CREAM TOPICALLY TO AFFECTED AREA TWICE DAILY      famotidine (PEPCID) 20 MG tablet Take 1 tablet by mouth twice daily 180 tablet 1    pantoprazole (PROTONIX) 40 MG tablet TAKE 1 TABLET BY MOUTH ONCE DAILY IN THE MORNING BEFORE BREAKFAST 90 tablet 0    doxazosin (CARDURA) 4 MG tablet Take 1 tablet by mouth nightly 90 tablet 1    pravastatin (PRAVACHOL) 20 MG tablet Take 1 tablet by mouth once daily 90 tablet 0    metFORMIN (GLUCOPHAGE) 1000 MG tablet TAKE 1 TABLET BY MOUTH TWICE DAILY WITH MEALS 180 tablet 0    glimepiride (AMARYL) 4 MG tablet TAKE 1 TABLET BY MOUTH IN THE MORNING AND AT BEDTIME 180 tablet 0    metFORMIN (GLUCOPHAGE) 500 MG tablet TAKE 1 TABLET BY MOUTH ONCE DAILY IN THE EVENING 90 tablet 1    candesartan (ATACAND) 32 MG tablet Take 1 tablet by mouth once daily 90 tablet 0

## 2024-10-11 NOTE — PATIENT INSTRUCTIONS
Do not fill up your stomach for 3 hours before bedtime, with either food or drink.  Elevate the head of the bed, perhaps with a foam wedge.  May take Pepcid at bedtime.  Do not clear your throat.  Frequent sips of water, Mucinex DM and or Ludens wild cherry cough drops might help with minimizing the throat clearing.  Do not routinely use menthol-containing cough drops.    Check with your PCP or the prescribing physician regarding avoiding ACE/ARB BP meds. Suggest at least two months trial off such medications, since it can take that long for the side effects to subside.  Do not stop the medication without instructions from the prescribing physician regarding any needed substitute blood pressure medication.

## 2024-10-24 DIAGNOSIS — I10 ESSENTIAL HYPERTENSION: ICD-10-CM

## 2024-10-24 RX ORDER — CANDESARTAN 32 MG/1
32 TABLET ORAL DAILY
Qty: 90 TABLET | Refills: 1 | Status: SHIPPED | OUTPATIENT
Start: 2024-10-24

## 2024-11-07 ENCOUNTER — LAB (OUTPATIENT)
Dept: LAB | Age: 74
End: 2024-11-07

## 2024-11-07 DIAGNOSIS — Z79.4 TYPE 2 DIABETES MELLITUS WITH HYPERGLYCEMIA, WITH LONG-TERM CURRENT USE OF INSULIN (HCC): ICD-10-CM

## 2024-11-07 DIAGNOSIS — E11.65 TYPE 2 DIABETES MELLITUS WITH HYPERGLYCEMIA, WITH LONG-TERM CURRENT USE OF INSULIN (HCC): ICD-10-CM

## 2024-11-07 LAB
25(OH)D3 SERPL-MCNC: 41 NG/ML (ref 30–100)
CALCIUM 24H UR-MRATE: 69 MG/24HR (ref 100–240)
CALCIUM SERPL-MCNC: 10.9 MG/DL (ref 8.5–10.5)
CALCIUM UR-MCNC: 3.4 MG/DL
CHOLEST SERPL-MCNC: 128 MG/DL (ref 100–199)
CREAT CL/1.73 SQ M 24H UR+SERPL-ARVRAT: 75 ML/MIN (ref 75–125)
CREAT SERPL-MCNC: 1.2 MG/DL (ref 0.5–1.2)
CREAT UR-MCNC: 74.6 MG/DL
DEPRECATED MEAN GLUCOSE BLD GHB EST-ACNC: 156 MG/DL (ref 70–126)
GLUCOSE SERPL-MCNC: 189 MG/DL (ref 70–108)
HBA1C MFR BLD HPLC: 7.2 % (ref 4.4–6.4)
HDLC SERPL-MCNC: 30 MG/DL
HOURS COLLECTED: 24 HRS
HOURS COLLECTED: 24 HRS
LDLC SERPL CALC-MCNC: 53 MG/DL
PHOSPHATE SERPL-MCNC: 2.8 MG/DL (ref 2.4–4.7)
PTH-INTACT SERPL-MCNC: 48.7 PG/ML (ref 15–65)
TRIGL SERPL-MCNC: 227 MG/DL (ref 0–199)
URINE VOLUME MEASURE: 2030 ML
URINE VOLUME: 2030 ML

## 2024-11-07 RX ORDER — GLIMEPIRIDE 4 MG/1
4 TABLET ORAL 2 TIMES DAILY
Qty: 180 TABLET | Refills: 1 | Status: SHIPPED | OUTPATIENT
Start: 2024-11-07

## 2024-11-07 NOTE — PROGRESS NOTES
tablet, Take 1 tablet by mouth twice daily, Disp: 180 tablet, Rfl: 1    pantoprazole (PROTONIX) 40 MG tablet, TAKE 1 TABLET BY MOUTH ONCE DAILY IN THE MORNING BEFORE BREAKFAST, Disp: 90 tablet, Rfl: 0    doxazosin (CARDURA) 4 MG tablet, Take 1 tablet by mouth nightly, Disp: 90 tablet, Rfl: 1    pravastatin (PRAVACHOL) 20 MG tablet, Take 1 tablet by mouth once daily, Disp: 90 tablet, Rfl: 0    metFORMIN (GLUCOPHAGE) 500 MG tablet, TAKE 1 TABLET BY MOUTH ONCE DAILY IN THE EVENING, Disp: 90 tablet, Rfl: 1    bumetanide (BUMEX) 0.5 MG tablet, TAKE 1 TABLET BY MOUTH ONCE DAILY AS NEEDED FOR SWELLING, Disp: 90 tablet, Rfl: 1    MOUNJARO 7.5 MG/0.5ML SOPN SC injection, Inject 0.5 mLs into the skin once a week, Disp: , Rfl:     sildenafil (VIAGRA) 50 MG tablet, Take 1 tablet by mouth daily as needed for Erectile Dysfunction, Disp: 90 tablet, Rfl: 1    blood glucose test strips (ASCENSIA AUTODISC VI;ONE TOUCH ULTRA TEST VI) strip, Use with associated glucose meter to test twice daily., Disp: 100 strip, Rfl: 5    Blood Glucose Monitoring Suppl (ONE TOUCH ULTRA 2) w/Device KIT, 1 kit by Does not apply route daily, Disp: 1 kit, Rfl: 0    vitamin B-12 (CYANOCOBALAMIN) 1000 MCG tablet, Take 1 tablet by mouth daily, Disp: , Rfl:     BiPAP Machine MISC, by Does not apply route, Disp: , Rfl:     Spacer/Aero-Holding Chambers (E-Z SPACER) SARA, Use with albuterol inhaler, Disp: 1 Device, Rfl: 0    diclofenac sodium 1 % GEL, Apply 2 g topically 2 times daily To painful joints of hands, Disp: 1 Tube, Rfl: 3    Multiple Vitamins-Minerals (THERAPEUTIC MULTIVITAMIN-MINERALS) tablet, Take 1 tablet by mouth daily, Disp: , Rfl:     ONE TOUCH ULTRA TEST STRP,   , Disp: , Rfl:     Cinnamon 500 MG TABS, Take 1,000 mg by mouth daily , Disp: , Rfl:     aspirin 81 MG tablet, Take 1 tablet by mouth daily, Disp: , Rfl:     Allergies   Allergen Reactions    Crestor [Rosuvastatin]      Muscle aches    Lipitor [Atorvastatin]      Back ache    Amlodipine

## 2024-11-12 ENCOUNTER — OFFICE VISIT (OUTPATIENT)
Dept: FAMILY MEDICINE CLINIC | Age: 74
End: 2024-11-12

## 2024-11-12 VITALS
DIASTOLIC BLOOD PRESSURE: 80 MMHG | BODY MASS INDEX: 26.83 KG/M2 | HEART RATE: 57 BPM | SYSTOLIC BLOOD PRESSURE: 138 MMHG | RESPIRATION RATE: 16 BRPM | WEIGHT: 187 LBS | TEMPERATURE: 97.6 F

## 2024-11-12 DIAGNOSIS — I10 ESSENTIAL HYPERTENSION: Primary | ICD-10-CM

## 2024-11-12 DIAGNOSIS — B96.89 ACUTE BACTERIAL SINUSITIS: ICD-10-CM

## 2024-11-12 DIAGNOSIS — R05.3 CHRONIC COUGH: ICD-10-CM

## 2024-11-12 DIAGNOSIS — J01.90 ACUTE BACTERIAL SINUSITIS: ICD-10-CM

## 2024-11-12 RX ORDER — GUANFACINE 1 MG/1
1 TABLET ORAL NIGHTLY
Qty: 30 TABLET | Refills: 1 | Status: SHIPPED | OUTPATIENT
Start: 2024-11-12

## 2024-11-12 RX ORDER — AZITHROMYCIN 250 MG/1
TABLET, FILM COATED ORAL
Qty: 6 TABLET | Refills: 0 | Status: SHIPPED | OUTPATIENT
Start: 2024-11-12

## 2024-11-12 RX ORDER — CARVEDILOL 25 MG/1
TABLET ORAL
Qty: 180 TABLET | Refills: 1 | Status: SHIPPED | OUTPATIENT
Start: 2024-11-12

## 2024-11-12 ASSESSMENT — ENCOUNTER SYMPTOMS: RHINORRHEA: 1

## 2024-11-27 DIAGNOSIS — E78.00 PURE HYPERCHOLESTEROLEMIA: ICD-10-CM

## 2024-11-27 RX ORDER — PRAVASTATIN SODIUM 20 MG
20 TABLET ORAL DAILY
Qty: 90 TABLET | Refills: 3 | Status: SHIPPED | OUTPATIENT
Start: 2024-11-27

## 2024-11-27 NOTE — TELEPHONE ENCOUNTER
Last visit- 11/12/2024  Next visit- 5/12/2025    Requested Prescriptions     Pending Prescriptions Disp Refills    pravastatin (PRAVACHOL) 20 MG tablet [Pharmacy Med Name: Pravastatin Sodium 20 MG Oral Tablet] 90 tablet 0     Sig: Take 1 tablet by mouth once daily

## 2024-12-05 RX ORDER — BUMETANIDE 0.5 MG/1
TABLET ORAL
Qty: 90 TABLET | Refills: 3 | Status: SHIPPED | OUTPATIENT
Start: 2024-12-05

## 2024-12-05 NOTE — TELEPHONE ENCOUNTER
Last visit- 11/12/2024  Next visit- 5/12/2025    Requested Prescriptions     Pending Prescriptions Disp Refills    bumetanide (BUMEX) 0.5 MG tablet [Pharmacy Med Name: Bumetanide 0.5 MG Oral Tablet] 90 tablet 3     Sig: TAKE 1 TABLET BY MOUTH ONCE DAILY AS NEEDED FOR SWELLING

## 2024-12-07 DIAGNOSIS — R09.89 THROAT CLEARING: ICD-10-CM

## 2024-12-07 DIAGNOSIS — K21.9 GASTROESOPHAGEAL REFLUX DISEASE, UNSPECIFIED WHETHER ESOPHAGITIS PRESENT: ICD-10-CM

## 2024-12-09 DIAGNOSIS — R09.89 THROAT CLEARING: ICD-10-CM

## 2024-12-09 DIAGNOSIS — K21.9 GASTROESOPHAGEAL REFLUX DISEASE, UNSPECIFIED WHETHER ESOPHAGITIS PRESENT: ICD-10-CM

## 2024-12-09 RX ORDER — PANTOPRAZOLE SODIUM 40 MG/1
TABLET, DELAYED RELEASE ORAL
Qty: 90 TABLET | Refills: 0 | Status: SHIPPED | OUTPATIENT
Start: 2024-12-09

## 2024-12-09 RX ORDER — PANTOPRAZOLE SODIUM 40 MG/1
TABLET, DELAYED RELEASE ORAL
Qty: 90 TABLET | Refills: 0 | Status: SHIPPED | OUTPATIENT
Start: 2024-12-09 | End: 2024-12-09

## 2024-12-19 ENCOUNTER — OFFICE VISIT (OUTPATIENT)
Dept: ENT CLINIC | Age: 74
End: 2024-12-19
Payer: MEDICARE

## 2024-12-19 VITALS
SYSTOLIC BLOOD PRESSURE: 138 MMHG | OXYGEN SATURATION: 99 % | BODY MASS INDEX: 26.46 KG/M2 | DIASTOLIC BLOOD PRESSURE: 68 MMHG | TEMPERATURE: 98.1 F | WEIGHT: 184.4 LBS | HEART RATE: 61 BPM

## 2024-12-19 DIAGNOSIS — R13.14 PHARYNGOESOPHAGEAL DYSPHAGIA: Primary | ICD-10-CM

## 2024-12-19 DIAGNOSIS — K21.9 LARYNGOPHARYNGEAL REFLUX DISEASE: ICD-10-CM

## 2024-12-19 DIAGNOSIS — R09.A2 SENSATION OF FOREIGN BODY IN LARYNX: ICD-10-CM

## 2024-12-19 DIAGNOSIS — K21.9 GASTROESOPHAGEAL REFLUX DISEASE, UNSPECIFIED WHETHER ESOPHAGITIS PRESENT: ICD-10-CM

## 2024-12-19 PROCEDURE — 99212 OFFICE O/P EST SF 10 MIN: CPT | Performed by: OTOLARYNGOLOGY

## 2024-12-19 PROCEDURE — G8484 FLU IMMUNIZE NO ADMIN: HCPCS | Performed by: OTOLARYNGOLOGY

## 2024-12-19 PROCEDURE — G8417 CALC BMI ABV UP PARAM F/U: HCPCS | Performed by: OTOLARYNGOLOGY

## 2024-12-19 PROCEDURE — 3078F DIAST BP <80 MM HG: CPT | Performed by: OTOLARYNGOLOGY

## 2024-12-19 PROCEDURE — 1036F TOBACCO NON-USER: CPT | Performed by: OTOLARYNGOLOGY

## 2024-12-19 PROCEDURE — 3017F COLORECTAL CA SCREEN DOC REV: CPT | Performed by: OTOLARYNGOLOGY

## 2024-12-19 PROCEDURE — G8427 DOCREV CUR MEDS BY ELIG CLIN: HCPCS | Performed by: OTOLARYNGOLOGY

## 2024-12-19 PROCEDURE — 1159F MED LIST DOCD IN RCRD: CPT | Performed by: OTOLARYNGOLOGY

## 2024-12-19 PROCEDURE — 3075F SYST BP GE 130 - 139MM HG: CPT | Performed by: OTOLARYNGOLOGY

## 2024-12-19 PROCEDURE — 1123F ACP DISCUSS/DSCN MKR DOCD: CPT | Performed by: OTOLARYNGOLOGY

## 2024-12-19 ASSESSMENT — ENCOUNTER SYMPTOMS
SINUS PRESSURE: 0
VOICE CHANGE: 1
SHORTNESS OF BREATH: 0
ABDOMINAL PAIN: 0
APNEA: 0
COLOR CHANGE: 0
TROUBLE SWALLOWING: 1
NAUSEA: 0
VOMITING: 0
COUGH: 0
WHEEZING: 0
DIARRHEA: 0
RHINORRHEA: 0
FACIAL SWELLING: 0
SORE THROAT: 0
CHOKING: 0
STRIDOR: 0
CHEST TIGHTNESS: 0

## 2024-12-19 NOTE — PROGRESS NOTES
[facility] [place] [facility]  [facility]SE [facility] TH[facility]  [address]  [facility] [address]  Dept: [phone]  Dept Fax: [phone]  Loc: [phone]    Bahman Ruelas is a 74 y.o. male who was referred by No ref. provider found for:  Chief Complaint   Patient presents with    Follow-up     Patient here for a 2 month follow up. Patient states he is having same problems. Pills get stuck in throat.    .    HPI:     Bahman Ruelas is a 74 y.o. male who presents today for follow-up on their pharyngoesophageal reflux and laryngeal edema.  They went off the angiotensin blood pressure medicine immediately and were switched to taking Carvedilol..  Patient still has the same feeling in his throat especially in the mornings with occasional hoarseness and is especially difficult time swallowing large pills.  They tend to hang up in his throat.  He has an appointment with a GI specialist next month.  He is following the GERD regimen faithfully.  They have a mechanical bed    History:     Allergies   Allergen Reactions    Crestor [Rosuvastatin]      Muscle aches    Lipitor [Atorvastatin]      Back ache    Amlodipine Swelling     Leg swelling     Current Outpatient Medications   Medication Sig Dispense Refill    pantoprazole (PROTONIX) 40 MG tablet TAKE 1 TABLET BY MOUTH ONCE DAILY IN THE MORNING BEFORE BREAKFAST 90 tablet 0    bumetanide (BUMEX) 0.5 MG tablet TAKE 1 TABLET BY MOUTH ONCE DAILY AS NEEDED FOR SWELLING 90 tablet 3    pravastatin (PRAVACHOL) 20 MG tablet Take 1 tablet by mouth once daily 90 tablet 3    carvedilol (COREG) 25 MG tablet Take 1 tablet by mouth twice daily 180 tablet 1    guanFACINE (TENEX) 1 MG tablet Take 1 tablet by mouth nightly 30 tablet 1    metFORMIN (GLUCOPHAGE) 1000 MG tablet TAKE 1 TABLET BY MOUTH TWICE DAILY WITH MEALS 180 tablet 1    glimepiride (AMARYL) 4 MG tablet TAKE 1 TABLET BY MOUTH IN THE MORNING AND AT BEDTIME 180 tablet 1

## 2024-12-31 DIAGNOSIS — I10 ESSENTIAL HYPERTENSION: ICD-10-CM

## 2025-01-02 RX ORDER — GUANFACINE 1 MG/1
1 TABLET ORAL NIGHTLY
Qty: 90 TABLET | Refills: 1 | Status: SHIPPED | OUTPATIENT
Start: 2025-01-02

## 2025-01-02 NOTE — PATIENT INSTRUCTIONS
Your Provider for Today: Dr. Hackett  Your nurses for today: Gaston    You may receive a survey regarding the care you received during your visit.  Your input is valuable to us.  We encourage you to complete and return your survey.  We hope you will choose us in the future for your healthcare needs.

## 2025-01-02 NOTE — TELEPHONE ENCOUNTER
Last visit- 11/12/2024  Next visit- 5/12/2025    Requested Prescriptions     Pending Prescriptions Disp Refills    guanFACINE (TENEX) 1 MG tablet [Pharmacy Med Name: GUANFACINE 1MG      TAB] 30 tablet 0     Sig: Take 1 tablet by mouth nightly

## 2025-01-03 ENCOUNTER — OFFICE VISIT (OUTPATIENT)
Dept: CARDIOLOGY CLINIC | Age: 75
End: 2025-01-03

## 2025-01-03 VITALS
WEIGHT: 182 LBS | HEIGHT: 70 IN | BODY MASS INDEX: 26.05 KG/M2 | HEART RATE: 71 BPM | DIASTOLIC BLOOD PRESSURE: 74 MMHG | SYSTOLIC BLOOD PRESSURE: 128 MMHG

## 2025-01-03 DIAGNOSIS — R06.00 DYSPNEA, UNSPECIFIED TYPE: ICD-10-CM

## 2025-01-03 DIAGNOSIS — I10 ESSENTIAL HYPERTENSION: ICD-10-CM

## 2025-01-03 DIAGNOSIS — R06.02 SHORTNESS OF BREATH: Primary | ICD-10-CM

## 2025-01-03 NOTE — PROGRESS NOTES
[facility] PHYSICIANS [place] SPECIALTY  [facility]  [address]  [facility] [address]  Dept: [phone]  Dept Fax: [phone]  Loc: [phone]    Visit Date: 1/3/2025    Mr. Ruelas is a 74 y.o. male  who presented for:  Chief Complaint   Patient presents with    Follow-up    Hypertension       HPI:   74 yo M c hx of DM, HTN, HLD, LEW on BiPAP is here for a follow up.  Had shortness of breath last year, workup was negative.  Had covid last year.   Quit smoking 30 yrs.  He is complaint with Bipap for 5 yrs.  HbA1c: 7.2. EKG is NSR, stress test in 2021 showed no ischemia.    He underwent Echo which was normal, EF 55-60%, normal valvular function.   Overall doing well.   EKG SR, with PVC      Current Outpatient Medications:     guanFACINE (TENEX) 1 MG tablet, Take 1 tablet by mouth nightly, Disp: 90 tablet, Rfl: 1    bumetanide (BUMEX) 0.5 MG tablet, TAKE 1 TABLET BY MOUTH ONCE DAILY AS NEEDED FOR SWELLING, Disp: 90 tablet, Rfl: 3    pravastatin (PRAVACHOL) 20 MG tablet, Take 1 tablet by mouth once daily, Disp: 90 tablet, Rfl: 3    carvedilol (COREG) 25 MG tablet, Take 1 tablet by mouth twice daily, Disp: 180 tablet, Rfl: 1    metFORMIN (GLUCOPHAGE) 1000 MG tablet, TAKE 1 TABLET BY MOUTH TWICE DAILY WITH MEALS, Disp: 180 tablet, Rfl: 1    glimepiride (AMARYL) 4 MG tablet, TAKE 1 TABLET BY MOUTH IN THE MORNING AND AT BEDTIME, Disp: 180 tablet, Rfl: 1    triamcinolone (KENALOG) 0.025 % cream, APPLY CREAM TOPICALLY TO AFFECTED AREA TWICE DAILY, Disp: , Rfl:     famotidine (PEPCID) 20 MG tablet, Take 1 tablet by mouth twice daily, Disp: 180 tablet, Rfl: 1    doxazosin (CARDURA) 4 MG tablet, Take 1 tablet by mouth nightly, Disp: 90 tablet, Rfl: 1    metFORMIN (GLUCOPHAGE) 500 MG tablet, TAKE 1 TABLET BY MOUTH ONCE DAILY IN THE EVENING, Disp: 90 tablet, Rfl: 1    MOUNJARO 7.5 MG/0.5ML SOPN SC injection, Inject 0.5 mLs into the skin once a week, Disp: , Rfl:     sildenafil (VIAGRA) 50 MG

## 2025-01-03 NOTE — PROGRESS NOTES
Reports chest heaviness with sitting more than standing/exertion Reports SOB and palpitations.   Reports dizziness once and a while as well.     EKG completed today in office.

## 2025-01-27 ENCOUNTER — TELEPHONE (OUTPATIENT)
Dept: FAMILY MEDICINE CLINIC | Age: 75
End: 2025-01-27

## 2025-01-27 ENCOUNTER — OFFICE VISIT (OUTPATIENT)
Dept: FAMILY MEDICINE CLINIC | Age: 75
End: 2025-01-27

## 2025-01-27 VITALS
WEIGHT: 182 LBS | OXYGEN SATURATION: 95 % | HEART RATE: 72 BPM | SYSTOLIC BLOOD PRESSURE: 136 MMHG | RESPIRATION RATE: 16 BRPM | DIASTOLIC BLOOD PRESSURE: 72 MMHG | BODY MASS INDEX: 26.11 KG/M2

## 2025-01-27 DIAGNOSIS — Z79.4 TYPE 2 DIABETES MELLITUS WITH HYPERGLYCEMIA, WITH LONG-TERM CURRENT USE OF INSULIN (HCC): ICD-10-CM

## 2025-01-27 DIAGNOSIS — M1A.9XX1 CHRONIC GOUT INVOLVING TOE OF LEFT FOOT WITH TOPHUS, UNSPECIFIED CAUSE: ICD-10-CM

## 2025-01-27 DIAGNOSIS — M20.012 MALLET FINGER, LEFT: Primary | ICD-10-CM

## 2025-01-27 DIAGNOSIS — E11.65 TYPE 2 DIABETES MELLITUS WITH HYPERGLYCEMIA, WITH LONG-TERM CURRENT USE OF INSULIN (HCC): ICD-10-CM

## 2025-01-27 DIAGNOSIS — E83.52 HYPERCALCEMIA: ICD-10-CM

## 2025-01-27 RX ORDER — DAPAGLIFLOZIN 10 MG/1
10 TABLET, FILM COATED ORAL EVERY MORNING
Qty: 90 TABLET | Refills: 0 | Status: SHIPPED | OUTPATIENT
Start: 2025-01-27

## 2025-01-27 SDOH — ECONOMIC STABILITY: FOOD INSECURITY: WITHIN THE PAST 12 MONTHS, YOU WORRIED THAT YOUR FOOD WOULD RUN OUT BEFORE YOU GOT MONEY TO BUY MORE.: NEVER TRUE

## 2025-01-27 SDOH — ECONOMIC STABILITY: FOOD INSECURITY: WITHIN THE PAST 12 MONTHS, THE FOOD YOU BOUGHT JUST DIDN'T LAST AND YOU DIDN'T HAVE MONEY TO GET MORE.: NEVER TRUE

## 2025-01-27 ASSESSMENT — PATIENT HEALTH QUESTIONNAIRE - PHQ9
1. LITTLE INTEREST OR PLEASURE IN DOING THINGS: NOT AT ALL
SUM OF ALL RESPONSES TO PHQ QUESTIONS 1-9: 0
2. FEELING DOWN, DEPRESSED OR HOPELESS: NOT AT ALL
SUM OF ALL RESPONSES TO PHQ9 QUESTIONS 1 & 2: 0
SUM OF ALL RESPONSES TO PHQ QUESTIONS 1-9: 0

## 2025-01-27 NOTE — PROGRESS NOTES
that Artificial Intelligence will be utilized during this visit to record, process the conversation to generate a clinical note, and support improvement of the AI technology. The patient (or guardian, if applicable) and other individuals in attendance at the appointment consented to the use of AI, including the recording.                    Electronically signed by Jess Vincent MD on 1/27/2025 at 1:33 PM

## 2025-01-27 NOTE — TELEPHONE ENCOUNTER
Aware. Will forward to Felicita Summers to see if she has thoughts. Paying a lot for GLP-1 too. Thanks.

## 2025-01-28 ENCOUNTER — CARE COORDINATION (OUTPATIENT)
Dept: CARE COORDINATION | Age: 75
End: 2025-01-28

## 2025-01-28 NOTE — TELEPHONE ENCOUNTER
If having pulse ox that is getting persistently less than 90-92%, should go to ER for further evaluation. If would have blue lips again that would not quickly recover, especially with lower pulse ox or SOB, go to ER. I'd like patient to also get a CXR. Order placed. They need to call [facility] when he comes for testing that he is currently getting ruled out for COVID prior to him coming in building for the test so he doesn't sit in waiting room for unnecessary time.       Call wife PA for pimecrolimus 1% cream APPROVED     Date(s) approved 12/29/2024-1/28/2026    Case # [id number]    Patient advised by          [][employer] Message  [x]Phone call   [x]LMOM  []L/M to call office as no active Communication consent on file  []Unable to leave detailed message as VM not approved on Communication consent       Pharmacy advised by    []Fax  [x]Phone call    Approval letter scanned into Media No

## 2025-01-28 NOTE — CARE COORDINATION
Spoke with Oskar and I am going to work on the application for assistance for his Farxiga. I will be mailing that to him soon, I was able to fax  her portion.      Felicita Summers [facility]  CC   Medication Assistance  [place] [facility]    (P) [phone]  (F) [phone]

## 2025-01-28 NOTE — TELEPHONE ENCOUNTER
I will give him a call and go over the PAP program for Farxiga and also send to my ACM to make sure there are no other needs to address.

## 2025-01-31 ENCOUNTER — CARE COORDINATION (OUTPATIENT)
Dept: CARE COORDINATION | Age: 75
End: 2025-01-31

## 2025-01-31 NOTE — CARE COORDINATION
I was able to mail the patient his portion of the application for his high cost medication.        Felicita Summers [facility]  CC   Medication Assistance  [place] [facility]    (P) [phone]  (F) [phone]

## 2025-02-05 DIAGNOSIS — E11.65 TYPE 2 DIABETES MELLITUS WITH HYPERGLYCEMIA, WITH LONG-TERM CURRENT USE OF INSULIN (HCC): ICD-10-CM

## 2025-02-05 DIAGNOSIS — Z79.4 TYPE 2 DIABETES MELLITUS WITH HYPERGLYCEMIA, WITH LONG-TERM CURRENT USE OF INSULIN (HCC): ICD-10-CM

## 2025-02-05 NOTE — TELEPHONE ENCOUNTER
Last visit- 11/12/2024  Next visit- 5/12/2025    Requested Prescriptions     Pending Prescriptions Disp Refills    metFORMIN (GLUCOPHAGE) 500 MG tablet [Pharmacy Med Name: metFORMIN HCl 500 MG Oral Tablet] 90 tablet 0     Sig: TAKE 1 TABLET BY MOUTH ONCE DAILY IN THE EVENING

## 2025-02-06 ENCOUNTER — CARE COORDINATION (OUTPATIENT)
Dept: CARE COORDINATION | Age: 75
End: 2025-02-06

## 2025-02-06 NOTE — CARE COORDINATION
Ambulatory Care Coordination Note     2/6/2025 9:49 AM     Patient outreach attempt by this [facility] today to offer care management services. [facility] was unable to reach the patient by telephone today;   left voice message requesting a return phone call to this [facility].     ACM: Terri Rashid RN     Care Summary Note: Initial attempt to reach patient for Care Coordination enrollment s/p recent referral for assistance with the management of his DM, Hypertension, healthcare and medication needs.  Patient was not available at the time of ACM call and generic voicemail message was left asking patient to please return call to [facility] direct number.  Will continue to work to f/u with patient in the future.     PCP/Specialist follow up:   Future Appointments         Provider Specialty Dept Phone    5/12/2025 10:00 AM Yasemin Bonilla MD Family Medicine [phone]    6/12/2025 9:40 AM Emelina De La Rosa APRN - [facility] Pulmonology [phone]    1/9/2026 11:00 AM Gennaro Hackett MD Cardiology [phone]            Follow Up:   Plan for next AC outreach in approximately 1-2 days  to complete:  - outreach attempt to offer care management services

## 2025-02-07 ENCOUNTER — CARE COORDINATION (OUTPATIENT)
Dept: CARE COORDINATION | Age: 75
End: 2025-02-07

## 2025-02-07 SDOH — HEALTH STABILITY: PHYSICAL HEALTH: ON AVERAGE, HOW MANY MINUTES DO YOU ENGAGE IN EXERCISE AT THIS LEVEL?: 0 MIN

## 2025-02-07 SDOH — ECONOMIC STABILITY: INCOME INSECURITY: HOW HARD IS IT FOR YOU TO PAY FOR THE VERY BASICS LIKE FOOD, HOUSING, MEDICAL CARE, AND HEATING?: NOT HARD AT ALL

## 2025-02-07 SDOH — SOCIAL STABILITY: SOCIAL NETWORK
DO YOU BELONG TO ANY CLUBS OR ORGANIZATIONS SUCH AS CHURCH GROUPS UNIONS, FRATERNAL OR ATHLETIC GROUPS, OR SCHOOL GROUPS?: NO

## 2025-02-07 SDOH — SOCIAL STABILITY: SOCIAL NETWORK: ARE YOU MARRIED, WIDOWED, DIVORCED, SEPARATED, NEVER MARRIED, OR LIVING WITH A PARTNER?: MARRIED

## 2025-02-07 SDOH — HEALTH STABILITY: MENTAL HEALTH: HOW MANY STANDARD DRINKS CONTAINING ALCOHOL DO YOU HAVE ON A TYPICAL DAY?: PATIENT DOES NOT DRINK

## 2025-02-07 SDOH — HEALTH STABILITY: MENTAL HEALTH: HOW OFTEN DO YOU HAVE A DRINK CONTAINING ALCOHOL?: NEVER

## 2025-02-07 SDOH — ECONOMIC STABILITY: FOOD INSECURITY: WITHIN THE PAST 12 MONTHS, YOU WORRIED THAT YOUR FOOD WOULD RUN OUT BEFORE YOU GOT MONEY TO BUY MORE.: NEVER TRUE

## 2025-02-07 SDOH — SOCIAL STABILITY: SOCIAL NETWORK: HOW OFTEN DO YOU GET TOGETHER WITH FRIENDS OR RELATIVES?: MORE THAN THREE TIMES A WEEK

## 2025-02-07 SDOH — ECONOMIC STABILITY: TRANSPORTATION INSECURITY
IN THE PAST 12 MONTHS, HAS LACK OF TRANSPORTATION KEPT YOU FROM MEETINGS, WORK, OR FROM GETTING THINGS NEEDED FOR DAILY LIVING?: NO

## 2025-02-07 SDOH — HEALTH STABILITY: PHYSICAL HEALTH: ON AVERAGE, HOW MANY DAYS PER WEEK DO YOU ENGAGE IN MODERATE TO STRENUOUS EXERCISE (LIKE A BRISK WALK)?: 0 DAYS

## 2025-02-07 SDOH — SOCIAL STABILITY: SOCIAL NETWORK: HOW OFTEN DO YOU ATTENT MEETINGS OF THE CLUB OR ORGANIZATION YOU BELONG TO?: NEVER

## 2025-02-07 SDOH — SOCIAL STABILITY: SOCIAL NETWORK
IN A TYPICAL WEEK, HOW MANY TIMES DO YOU TALK ON THE PHONE WITH FAMILY, FRIENDS, OR NEIGHBORS?: MORE THAN THREE TIMES A WEEK

## 2025-02-07 SDOH — HEALTH STABILITY: MENTAL HEALTH
STRESS IS WHEN SOMEONE FEELS TENSE, NERVOUS, ANXIOUS, OR CAN'T SLEEP AT NIGHT BECAUSE THEIR MIND IS TROUBLED. HOW STRESSED ARE YOU?: ONLY A LITTLE

## 2025-02-07 SDOH — SOCIAL STABILITY: SOCIAL NETWORK: HOW OFTEN DO YOU ATTEND CHURCH OR RELIGIOUS SERVICES?: MORE THAN 4 TIMES PER YEAR

## 2025-02-07 SDOH — ECONOMIC STABILITY: FOOD INSECURITY: WITHIN THE PAST 12 MONTHS, THE FOOD YOU BOUGHT JUST DIDN'T LAST AND YOU DIDN'T HAVE MONEY TO GET MORE.: NEVER TRUE

## 2025-02-07 SDOH — ECONOMIC STABILITY: INCOME INSECURITY: IN THE LAST 12 MONTHS, WAS THERE A TIME WHEN YOU WERE NOT ABLE TO PAY THE MORTGAGE OR RENT ON TIME?: NO

## 2025-02-10 ENCOUNTER — CARE COORDINATION (OUTPATIENT)
Dept: CARE COORDINATION | Age: 75
End: 2025-02-10

## 2025-02-10 NOTE — CARE COORDINATION
Copy of the DM zone handout and a BS log sheet as well as the Right Care Right Time handout mailed to patient.

## 2025-02-12 NOTE — CARE COORDINATION
I agree with the Care Coordinator's Plan of Care.    
w/Device KIT 1 kit by Does not apply route daily  1 kit 0    BiPAP Machine MISC by Does not apply route       Spacer/Aero-Holding Chambers (E-Z SPACER) SARA Use with albuterol inhaler  1 Device 0    ONE TOUCH ULTRA TEST STRP  7/16/2015: Received from: External Pharmacy       No current facility-administered medications for this visit.       Advance Care Planning:   Not reviewed during this call     Care Planning:   Not completed during this call    PCP/Specialist follow up:   Future Appointments         Provider Specialty Dept Phone    5/12/2025 10:00 AM Yasemin Bonilla MD Family Medicine [phone]    6/12/2025 9:40 AM Emelina De La Rosa APRN - [facility] Pulmonology [phone]    1/9/2026 11:00 AM Gennaro Hackett MD Cardiology [phone]            Follow Up:   Plan for next ACM outreach in approximately 1 week to complete:  - Disease specific assessments - DM and Hypertension   - Advance care planning - Review Pending  - F/u on recent medication concerns   - Monitor for ongoing medication assistance (Farxiga)  - Goal progression  - Education   - RPM - Pt stable at this time  - Monitor for additional needs  - Monitor for readiness to discharge from Care Coordination     Patient  is agreeable to this plan.

## 2025-02-13 ENCOUNTER — CARE COORDINATION (OUTPATIENT)
Dept: CARE COORDINATION | Age: 75
End: 2025-02-13

## 2025-02-13 NOTE — CARE COORDINATION
Ambulatory Care Coordination Note     2025 2:29 PM     Patient Current Location:  [place]     ACM contacted the patient by telephone. Verified name and  with patient as identifiers.         ACM: Terri Rashid RN     Challenges to be reviewed by the provider   Additional needs identified to be addressed with provider Yes    Pt shared he started Tenex/Guanfacine as directed, but does not feel this is working as well, and he has not had any change in the mucous in his throat since making the change (this medication also has higher OOP cost).  Pt is asking if would be appropriate to return to his Cardesartan 32 mg daily?                                                                                                                             Pt reported he is also still taking Glimpiride 4mg (1/2 tab in the AM and 1 tab in the PM) as he is awaiting approval for Farxiga assistance.                                  Please advise.  Thank you!         Method of communication with provider: chart routing.    Utilization: Patient has not had any utilization since our last call.     Care Summary Note:  Patient was called for continued Care Coordination follow up and education re: the management of his DM, Hypertension, and healthcare needs.  Patient shared he has been doing well and his finger continues to heal with splint in place.  Patient denied any c/o increased/worsening pain or swelling being present and he stated he is following ortho instructions as directed.  Patient denied any recent hypertension signs/symptoms being present.  Patient also denied any c/o hypoglycemia or hyperglycemia symptoms being present.  ACM educated patient on signs and symptoms he should report as well as the importance of early symptom recognition and follow up.  Patient was also educated on the importance of calling for an earlier appointment with any new, change, or worsening of his symptoms.  Hypoglycemia and hyperglycemia signs and

## 2025-02-13 NOTE — CARE COORDINATION
Patient called and updated of PCP instructions.  Patient verified he does have home BP cuff and ACM reviewed home BP monitoring instructions with patient and the need to keep a BP log for ongoing monitoring.  Patient verbalized understanding and denied any additional questions or concerns.

## 2025-02-17 ENCOUNTER — OFFICE VISIT (OUTPATIENT)
Dept: FAMILY MEDICINE CLINIC | Age: 75
End: 2025-02-17

## 2025-02-17 ENCOUNTER — CARE COORDINATION (OUTPATIENT)
Dept: CARE COORDINATION | Age: 75
End: 2025-02-17

## 2025-02-17 ENCOUNTER — TELEPHONE (OUTPATIENT)
Dept: FAMILY MEDICINE CLINIC | Age: 75
End: 2025-02-17

## 2025-02-17 VITALS
RESPIRATION RATE: 16 BRPM | HEART RATE: 56 BPM | DIASTOLIC BLOOD PRESSURE: 82 MMHG | OXYGEN SATURATION: 99 % | WEIGHT: 180.6 LBS | TEMPERATURE: 97.5 F | HEIGHT: 70 IN | BODY MASS INDEX: 25.86 KG/M2 | SYSTOLIC BLOOD PRESSURE: 156 MMHG

## 2025-02-17 DIAGNOSIS — N52.9 ERECTILE DYSFUNCTION, UNSPECIFIED ERECTILE DYSFUNCTION TYPE: ICD-10-CM

## 2025-02-17 DIAGNOSIS — M54.2 NECK PAIN: ICD-10-CM

## 2025-02-17 DIAGNOSIS — R53.1 WEAKNESS: Primary | ICD-10-CM

## 2025-02-17 DIAGNOSIS — E11.65 TYPE 2 DIABETES MELLITUS WITH HYPERGLYCEMIA, WITH LONG-TERM CURRENT USE OF INSULIN (HCC): ICD-10-CM

## 2025-02-17 DIAGNOSIS — Z79.4 TYPE 2 DIABETES MELLITUS WITH HYPERGLYCEMIA, WITH LONG-TERM CURRENT USE OF INSULIN (HCC): ICD-10-CM

## 2025-02-17 DIAGNOSIS — I10 ESSENTIAL HYPERTENSION: ICD-10-CM

## 2025-02-17 LAB
INFLUENZA VIRUS A RNA: NEGATIVE
INFLUENZA VIRUS B RNA: NEGATIVE

## 2025-02-17 RX ORDER — OMEPRAZOLE 40 MG/1
40 CAPSULE, DELAYED RELEASE ORAL 2 TIMES DAILY
COMMUNITY
Start: 2025-01-30 | End: 2025-02-20

## 2025-02-17 RX ORDER — GLIMEPIRIDE 4 MG/1
4 TABLET ORAL
COMMUNITY
End: 2025-02-17

## 2025-02-17 RX ORDER — CANDESARTAN 32 MG/1
32 TABLET ORAL DAILY
Qty: 90 TABLET | Refills: 1 | Status: SHIPPED | OUTPATIENT
Start: 2025-02-17 | End: 2025-02-17

## 2025-02-17 RX ORDER — CANDESARTAN 32 MG/1
32 TABLET ORAL DAILY
COMMUNITY

## 2025-02-17 NOTE — PROGRESS NOTES
Health Maintenance Due   Topic Date Due    Diabetic foot exam  08/16/2024    DTaP/Tdap/Td vaccine (2 - Td or Tdap) 08/26/2024    COVID-19 Vaccine (4 - 2024-25 season) 09/01/2024

## 2025-02-17 NOTE — CARE COORDINATION
Attempted to reach patient for continued Care Coordination follow up and education.  Patient was unavailable at the time of my call, and a generic voicemail message was left asking patient to return my call at [phone].

## 2025-02-17 NOTE — TELEPHONE ENCOUNTER
Can we confirm with gastroenterology that patient is supposed to be on both Pantoprazole 40 mg daily and Omeprazole 40 mg twice a day? Sees Dr. Agrawal. Thanks.  Also get records.  Please update patient about what he should be taking and let me know also. Confirm his neck pain is improving.

## 2025-02-17 NOTE — PROGRESS NOTES
[facility] PROFESSIONAL SERVS  [facility]  [address] [facility] [facility] [address]  Dept: [phone]  Dept Fax: [phone]  Loc: [phone]      Bahman Ruelas is a 74 y.o. male who presents todayfor his medical conditions/complaints as noted below.  Bahman Ruelas is c/o of Neck Pain (Patient reports left sided neck pain and swelling x 2 days. /He reported abdominal cramping on 01/13/2025. Denies fever, denies vomiting, denies sore throat but has some raspiness to his voice. Reports that he feels weak and shaky. /He did check BP the other day and it was 135/77. )      :     History of Present Illness  The patient presents for evaluation of abdominal pain, neck pain, hypertension, diabetes mellitus, erectile dysfunction, and medication management.    He experienced an episode of severe, intermittent abdominal pain accompanied by shaking. The pain was not associated with fever or high blood pressure. The pain started to subside after 2 days. He did not have any diarrhea or vomiting. He also reports a sensation of weakness following the episode. He has been managing his symptoms with Pepto-Bismol and Protonix. He is currently on famotidine, Protonix, and omeprazole for heartburn, as prescribed by his gastroenterologist. He recently underwent an upper endoscopy, during which biopsies were taken. He has a history of muscle relaxant use but has not used them recently. He has a supply of diclofenac gel at home but does not use it regularly.    He reports experiencing neck pain, which he attributes to a recent illness characterized by severe, intermittent abdominal pain. The pain is not constant and does not worsen with neck movement. He is not interested in taking any medications for the pain.    His blood pressure medication was changed due to his ENT visit. He was taken off candesartan and put on guanfacine. He has been on carvedilol and doxazosin for a long time. He wants to

## 2025-02-18 NOTE — TELEPHONE ENCOUNTER
Spoke with Desi at Dr. Agrawal's office.   Patient is to take Protonix 40 mg one po daily in the am before breakfast.   Omeprazole is not on his med list from last visit of 01/29/2025.    They will fax office notes from 01/13/2025 as it is completed and then the one from 01/29/2025 when is it signed.     Spoke with patient and he voiced understanding on the medication orders.   As for his neck, he said it is the same. It only hurts if he rubs it and he plans to continue to monitor it.

## 2025-02-20 NOTE — TELEPHONE ENCOUNTER
Aware. This is reasonable. He was instructed to let me know if pain worsens or fails to resolve.  Please have patient stop omeprazole.

## 2025-02-26 DIAGNOSIS — I10 ESSENTIAL HYPERTENSION: ICD-10-CM

## 2025-02-27 ENCOUNTER — CARE COORDINATION (OUTPATIENT)
Dept: CARE COORDINATION | Age: 75
End: 2025-02-27

## 2025-02-27 ENCOUNTER — TELEPHONE (OUTPATIENT)
Dept: FAMILY MEDICINE CLINIC | Age: 75
End: 2025-02-27

## 2025-02-27 RX ORDER — DOXAZOSIN 4 MG/1
4 TABLET ORAL NIGHTLY
Qty: 90 TABLET | Refills: 1 | Status: SHIPPED | OUTPATIENT
Start: 2025-02-27

## 2025-02-27 NOTE — TELEPHONE ENCOUNTER
Aware. Continue to monitor.  Continue to monitor vital signs. Encouraged supportive care with rest, hydration, honey for cough. Tylenol as needed.  Honey for cough.  Return promptly if worsening or in 1-2 weeks if symptom persist. Encouraged checking home pulse oximetry with call if persistently 94 or less or any reading under 90.

## 2025-02-27 NOTE — TELEPHONE ENCOUNTER
Patients wife here in office today and tested positive for covid- wife voiced that patient is home sick with same symptoms- wife was notified to check vitals at home and schedule VV.   Wife Marlin calling in and patient declines VV and paxlovid as he voiced that this made him feel worse in the past. BP- 110/61, pulse 74 pulse ox 94.

## 2025-02-27 NOTE — CARE COORDINATION
Ambulatory Care Coordination Note     2025 2:56 PM     Patient Current Location:  Home: [address]  [facility] [address]     ACM contacted the patient by telephone. Verified name and  with patient as identifiers.         ACM: Terri Rashid RN     Challenges to be reviewed by the provider   Additional needs identified to be addressed with provider No    none         Method of communication with provider: none.    Utilization: Patient has not had any utilization since our last call.     Care Summary Note: Patient was called for continued Care Coordination follow up and education re: the management of his DM, Hypertension, and healthcare needs.  Patient shared he has been doing \"ok\" but is not feeling well today as his wife was just diagnosed with COVID-19 and he is having similar symptoms.  COVID-19 education and zone information was reviewed and patient was educated on signs/symptoms he should report as well as the importance of early symptom recognition and follow up.  Patent shared his pulse ox has also remained stable with readings of 94%.  Pulse ox use and what to report/when to follow up also reviewed with patient and patient verbalized understanding.   Patient stated BS and BP  have remained stable and he denied any concerns.  Zone education and signs/symptoms patient should report was reviewed along with the importance of early symptom recognition and follow up.  Patient was also educated on the need to call for an earlier appointment with any new/worsening of his symptoms.  Patient acknowledged understanding.  Patient denied any questions re: his recent PCP visit.  Patient denied any other questions, concerns, or needs and he was encouraged to call with any that may develop.      Offered patient enrollment in the Remote Patient Monitoring (RPM) program for in-home monitoring: Yes, but did not enroll at this time: controlled chronic disease management and already monitoring with home equipment.

## 2025-03-07 ENCOUNTER — LAB (OUTPATIENT)
Dept: LAB | Age: 75
End: 2025-03-07

## 2025-03-07 DIAGNOSIS — M1A.9XX1 CHRONIC GOUT INVOLVING TOE OF LEFT FOOT WITH TOPHUS, UNSPECIFIED CAUSE: ICD-10-CM

## 2025-03-07 LAB
25(OH)D3 SERPL-MCNC: 64 NG/ML (ref 30–100)
ALBUMIN SERPL BCG-MCNC: 3.8 G/DL (ref 3.4–4.9)
ALP SERPL-CCNC: 62 U/L (ref 40–129)
ALT SERPL W/O P-5'-P-CCNC: 20 U/L (ref 10–50)
ANION GAP SERPL CALC-SCNC: 8 MEQ/L (ref 8–16)
AST SERPL-CCNC: 21 U/L (ref 10–50)
BILIRUB SERPL-MCNC: 0.3 MG/DL (ref 0.3–1.2)
BUN SERPL-MCNC: 17 MG/DL (ref 8–23)
CALCIUM SERPL-MCNC: 10.8 MG/DL (ref 8.8–10.2)
CHLORIDE SERPL-SCNC: 104 MEQ/L (ref 98–111)
CHOLEST SERPL-MCNC: 128 MG/DL (ref 100–199)
CO2 SERPL-SCNC: 30 MEQ/L (ref 22–29)
CREAT SERPL-MCNC: 1.2 MG/DL (ref 0.7–1.2)
CREAT UR-MCNC: 184 MG/DL
DEPRECATED MEAN GLUCOSE BLD GHB EST-ACNC: 177 MG/DL (ref 70–126)
GFR SERPL CREATININE-BSD FRML MDRD: 63 ML/MIN/1.73M2
GLUCOSE SERPL-MCNC: 162 MG/DL (ref 74–109)
HBA1C MFR BLD HPLC: 7.9 % (ref 4–6)
HDLC SERPL-MCNC: 31 MG/DL
LDLC SERPL CALC-MCNC: 76 MG/DL
MICROALBUMIN UR-MCNC: < 2 MG/DL
MICROALBUMIN/CREAT RATIO PNL UR: 11 MG/G (ref 0–30)
PHOSPHATE SERPL-MCNC: 3.4 MG/DL (ref 2.5–4.5)
POTASSIUM SERPL-SCNC: 4.8 MEQ/L (ref 3.5–5.2)
PROT SERPL-MCNC: 6.3 G/DL (ref 6.4–8.3)
SODIUM SERPL-SCNC: 142 MEQ/L (ref 135–145)
TRIGL SERPL-MCNC: 104 MG/DL (ref 0–199)
URATE SERPL-MCNC: 8.7 MG/DL (ref 3.7–7)

## 2025-03-08 ENCOUNTER — PATIENT MESSAGE (OUTPATIENT)
Dept: FAMILY MEDICINE CLINIC | Age: 75
End: 2025-03-08

## 2025-03-08 DIAGNOSIS — R09.89 THROAT CLEARING: ICD-10-CM

## 2025-03-08 DIAGNOSIS — K21.9 GASTROESOPHAGEAL REFLUX DISEASE, UNSPECIFIED WHETHER ESOPHAGITIS PRESENT: ICD-10-CM

## 2025-03-10 ENCOUNTER — CARE COORDINATION (OUTPATIENT)
Dept: CARE COORDINATION | Age: 75
End: 2025-03-10

## 2025-03-10 ENCOUNTER — RESULTS FOLLOW-UP (OUTPATIENT)
Dept: FAMILY MEDICINE CLINIC | Age: 75
End: 2025-03-10

## 2025-03-10 RX ORDER — PANTOPRAZOLE SODIUM 40 MG/1
40 TABLET, DELAYED RELEASE ORAL DAILY
Qty: 90 TABLET | Refills: 0 | Status: SHIPPED | OUTPATIENT
Start: 2025-03-10

## 2025-03-10 NOTE — CARE COORDINATION
Ambulatory Care Coordination Note     3/10/2025 11:44 AM     Patient Current Location:  [place]     ACM contacted the patient by telephone. Verified name and  with patient as identifiers.         ACM: Terri Rashid RN     Challenges to be reviewed by the provider   Additional needs identified to be addressed with provider No    none         Method of communication with provider: none.    Utilization: Patient has not had any utilization since our last call.     Care Summary Note: Patient was called for continued Care Coordination follow up and education re: the management of his DM, Hypertension, and healthcare needs.  Patient shared he has been doing well and is feeling better.  Patient reported his wife was recently diagnosed with COVID-19 and he feels he probably also had virus d/t similar symptoms.  Patient denied any current concerns.  Patient shared he is taking his medications and he was instructed to bring updated med list to his upcoming appointment.  Patient verbalized understanding.  ACM reviewed signs and symptoms patient should report and the importance of early symptom recognition and follow up.  Patient acknowledged understanding.  Upcoming appointment information was reviewed and verified with patient and patient was encouraged to bring any questions/concerns with him to his appointment.  Patient verbalized understanding.  Healthcare Decision Maker information was reviewed and education provided.  Emergency Contact information reviewed and verified.  ACP note completed.  Patient denied any other questions, concerns, or needs and he was encouraged to call with any that may develop.     Offered patient enrollment in the Remote Patient Monitoring (RPM) program for in-home monitoring: Yes, but did not enroll at this time: controlled chronic disease management.     Assessments Completed:   No changes since last call    Medications Reviewed:   Patient denies any changes with medications and reports taking

## 2025-03-10 NOTE — ACP (ADVANCE CARE PLANNING)
Advance Care Planning   Healthcare Decision Maker:    Today we discussed Healthcare Decision Makers. The patient is considering options.     Healthcare Decision Maker information and education was reviewed with patient.  Patient verbalized understanding.  Emergency Contact information was reviewed and verified with patient.

## 2025-03-17 ENCOUNTER — OFFICE VISIT (OUTPATIENT)
Dept: FAMILY MEDICINE CLINIC | Age: 75
End: 2025-03-17
Payer: MEDICARE

## 2025-03-17 ENCOUNTER — TELEPHONE (OUTPATIENT)
Dept: FAMILY MEDICINE CLINIC | Age: 75
End: 2025-03-17

## 2025-03-17 VITALS
BODY MASS INDEX: 26.4 KG/M2 | HEART RATE: 78 BPM | WEIGHT: 184 LBS | OXYGEN SATURATION: 95 % | DIASTOLIC BLOOD PRESSURE: 78 MMHG | SYSTOLIC BLOOD PRESSURE: 134 MMHG | RESPIRATION RATE: 18 BRPM

## 2025-03-17 DIAGNOSIS — R09.89 THROAT CLEARING: ICD-10-CM

## 2025-03-17 DIAGNOSIS — E11.69 TYPE 2 DIABETES MELLITUS WITH OTHER SPECIFIED COMPLICATION, WITHOUT LONG-TERM CURRENT USE OF INSULIN: ICD-10-CM

## 2025-03-17 DIAGNOSIS — E78.00 PURE HYPERCHOLESTEROLEMIA: ICD-10-CM

## 2025-03-17 DIAGNOSIS — I10 ESSENTIAL HYPERTENSION: ICD-10-CM

## 2025-03-17 DIAGNOSIS — M20.012 MALLET FINGER, LEFT: ICD-10-CM

## 2025-03-17 DIAGNOSIS — E83.52 HYPERCALCEMIA: ICD-10-CM

## 2025-03-17 DIAGNOSIS — M1A.9XX1 CHRONIC GOUT INVOLVING TOE OF LEFT FOOT WITH TOPHUS, UNSPECIFIED CAUSE: ICD-10-CM

## 2025-03-17 DIAGNOSIS — K21.9 GASTROESOPHAGEAL REFLUX DISEASE, UNSPECIFIED WHETHER ESOPHAGITIS PRESENT: Primary | ICD-10-CM

## 2025-03-17 PROCEDURE — 3075F SYST BP GE 130 - 139MM HG: CPT | Performed by: FAMILY MEDICINE

## 2025-03-17 PROCEDURE — 1159F MED LIST DOCD IN RCRD: CPT | Performed by: FAMILY MEDICINE

## 2025-03-17 PROCEDURE — 3017F COLORECTAL CA SCREEN DOC REV: CPT | Performed by: FAMILY MEDICINE

## 2025-03-17 PROCEDURE — 3051F HG A1C>EQUAL 7.0%<8.0%: CPT | Performed by: FAMILY MEDICINE

## 2025-03-17 PROCEDURE — 1123F ACP DISCUSS/DSCN MKR DOCD: CPT | Performed by: FAMILY MEDICINE

## 2025-03-17 PROCEDURE — 99214 OFFICE O/P EST MOD 30 MIN: CPT | Performed by: FAMILY MEDICINE

## 2025-03-17 PROCEDURE — G8417 CALC BMI ABV UP PARAM F/U: HCPCS | Performed by: FAMILY MEDICINE

## 2025-03-17 PROCEDURE — 3078F DIAST BP <80 MM HG: CPT | Performed by: FAMILY MEDICINE

## 2025-03-17 PROCEDURE — G8427 DOCREV CUR MEDS BY ELIG CLIN: HCPCS | Performed by: FAMILY MEDICINE

## 2025-03-17 PROCEDURE — 1036F TOBACCO NON-USER: CPT | Performed by: FAMILY MEDICINE

## 2025-03-17 PROCEDURE — 2022F DILAT RTA XM EVC RTNOPTHY: CPT | Performed by: FAMILY MEDICINE

## 2025-03-17 PROCEDURE — G2211 COMPLEX E/M VISIT ADD ON: HCPCS | Performed by: FAMILY MEDICINE

## 2025-03-17 NOTE — TELEPHONE ENCOUNTER
Spoke with patient- states that he saw ENT and they believed that it was acid reflex and they wanted him to see GI- saw GI and had a scope- placed on Protonix. ENT states that if Protonix did not help that he may need to see an allergist- patient reports that the protonix has not seemed to help with the throat clearing yet.

## 2025-03-17 NOTE — PROGRESS NOTES
given/sent  Orders Placed This Encounter   Medications    Tirzepatide 12.5 MG/0.5ML SOAJ     Sig: Inject 12.5 mg into the skin every 7 days     Dispense:  2 mL     Refill:  0    Tirzepatide 15 MG/0.5ML SOAJ     Sig: Inject 15 mg into the skin every 7 days     Dispense:  6 mL     Refill:  0       Patient given educational materials - see patient instructions.  Discussed use, benefit, and side effects of recommended medications.  All patient questions answered.  Pt voiced understanding.  Reviewed health maintenance.            The patient (or guardian, if applicable) and other individuals in attendance with the patient were advised that Artificial Intelligence will be utilized during this visit to record, process the conversation to generate a clinical note, and support improvement of the AI technology. The patient (or guardian, if applicable) and other individuals in attendance at the appointment consented to the use of AI, including the recording.          Long term patient for whom continuity impacted care.              Electronically signed by Jess Vincent MD on 3/17/2025 at 1:57 PM

## 2025-03-20 LAB — DIABETIC RETINOPATHY: NEGATIVE

## 2025-03-20 NOTE — TELEPHONE ENCOUNTER
Aware. Okay to give a bit of time on Protonix to see if this works. If not better in 6 weeks, let me know and will refer to allergist. Thanks.

## 2025-03-26 ENCOUNTER — CARE COORDINATION (OUTPATIENT)
Dept: CARE COORDINATION | Age: 75
End: 2025-03-26

## 2025-03-26 NOTE — CARE COORDINATION
Ambulatory Care Coordination Note  3/26/2025    Patient Current Location:  Home: [address]  [facility] [facility] [address]     KRISTIN PERALTA contacted the patient by telephone. Verified name and  with patient as identifiers. Provided introduction to self, and explanation of the KRISTIN PERALTA role.     Challenges to be reviewed by the provider   Additional needs identified to be addressed with provider: No  none               Method of communication with provider: none.    I spoke with the patient for continued Care Coordination follow up and education.  Patient states overall he is doing good.  Admits to a sore throat today, but hopeful its due to the weather/outdoors.  Patient continues taking Protonix.  Patient is up update PCP if no better in a few weeks and she will to refer to allergist.  Educated on how to identify sx's that are worse than the baseline and the importance of early symptom recognition and reporting to prevent exacerbation which may lead to ED visits and hospital admissions.  I advised patient to contact PCP office if needed.  No further needs at this time.       Lab Results       None            Care Coordination Interventions    Referral from Primary Care Provider: No  Suggested Interventions and Community Resources  Diabetes Education: Completed (Comment: 2025)  Disease Specific Clinic: Not Started  Medication Assistance Program: Completed (Comment: Felicita following for Farxiga - 2025)  Medi Set or Pill Pack: Declined (Comment: 2025)  Pharmacist: Declined (Comment: 2025)  Registered Dietician: Not Started  Social Work: Not Started  Transportation Support: Declined (Comment: Denied any need - 2025)  Zone Management Tools: Completed (Comment: DM - 2025)  Other Services or Interventions: [facility] Cardiology, [facility], and ENT  [facility] Endo, [facility] - Dr. Agrawal, and [facility]          Goals Addressed    None         Future Appointments   Date Time Provider Department Center   2025

## 2025-04-09 ENCOUNTER — CARE COORDINATION (OUTPATIENT)
Dept: CARE COORDINATION | Age: 75
End: 2025-04-09

## 2025-04-09 NOTE — CARE COORDINATION
I sent the patient a [employer] message seeing if he was planning on returning the information I had sent him for assistance on Farxiga back in January.        Felicita Summers [facility]  CC   Medication Assistance  [place] [employer]    (P) [phone]  (F) [phone]

## 2025-04-10 ENCOUNTER — CARE COORDINATION (OUTPATIENT)
Dept: CARE COORDINATION | Age: 75
End: 2025-04-10

## 2025-04-10 NOTE — CARE COORDINATION
Monitoring   No change     Self-Monitored Blood Glucose - I will check my blood sugar random blood sugars    MyChart Blood Glucose No flowsheet data found.    Barriers: none  Plan for overcoming my barriers: N/A  Confidence: 10/10  Anticipated Goal Completion Date: 2-26-19                 PCP/Specialist follow up:   Future Appointments         Provider Specialty Dept Phone    6/12/2025 9:40 AM Emelina De La Rosa APRN - [facility] Pulmonology [phone]    7/10/2025 10:00 AM Jess Vincent MD Family Medicine [phone]    1/9/2026 11:00 AM Gennaro Hackett MD Cardiology [phone]            Follow Up:   Plan for next ACM outreach in approximately 2 weeks to complete:  - Disease specific assessments - DM and Hypertension   - Advance care planning - Review Completed   - Monitor for ongoing medication assistance (Farxiga) - Pt is declining   - Goal progression  - Education   - RPM - Pt stable at this time  - Monitor for additional needs  - Monitor for readiness to discharge from Care Coordination     Patient  is agreeable to this plan.

## 2025-04-11 DIAGNOSIS — Z79.4 TYPE 2 DIABETES MELLITUS WITH HYPERGLYCEMIA, WITH LONG-TERM CURRENT USE OF INSULIN (HCC): ICD-10-CM

## 2025-04-11 DIAGNOSIS — E11.65 TYPE 2 DIABETES MELLITUS WITH HYPERGLYCEMIA, WITH LONG-TERM CURRENT USE OF INSULIN (HCC): ICD-10-CM

## 2025-04-17 RX ORDER — TIRZEPATIDE 10 MG/.5ML
INJECTION, SOLUTION SUBCUTANEOUS
Qty: 4 ML | Refills: 0 | OUTPATIENT
Start: 2025-04-17

## 2025-04-22 DIAGNOSIS — K21.9 GASTROESOPHAGEAL REFLUX DISEASE, UNSPECIFIED WHETHER ESOPHAGITIS PRESENT: ICD-10-CM

## 2025-04-23 ENCOUNTER — CARE COORDINATION (OUTPATIENT)
Dept: CARE COORDINATION | Age: 75
End: 2025-04-23

## 2025-04-23 ENCOUNTER — TELEPHONE (OUTPATIENT)
Dept: FAMILY MEDICINE CLINIC | Age: 75
End: 2025-04-23

## 2025-04-23 NOTE — CARE COORDINATION
Ambulatory Care Coordination Note     2025 11:08 AM     Patient Current Location:  [place]     ACM contacted the patient by telephone. Verified name and  with patient as identifiers.         ACM: Terri Rashid RN     Challenges to be reviewed by the provider   Additional needs identified to be addressed with provider Yes    Pt with c/o recent increased fasting BS readings (today 191).  Pt stated he is taking his metformin as prescribed and is scheduled to start 12.5 mg Mounjaro on .  Pt also with c/o increased fatigue.  Pt/wife shared he has been more active recently d/t improved weather.  Pt asked to send My Chart message with recent BS readings.          Method of communication with provider: chart routing.    Utilization: Patient has not had any utilization since our last call.     Care Summary Note: Patient was called for continued Care Coordination follow up and education re: the management of his DM, Hypertension, and healthcare needs.  Patient shared he is currently driving and unable to talk and asked that ACM speak with his wife/HIPAA contact, Marlin.  Marlin and patient shared his BS have been more elevated in the AM recently, even though he has been more active during the day and he continues to take his medications as prescribed.  Patient and wife denied any recent change in his diet.  Patient stated BS was 191 this AM and no other readings available at this time.  ACM requested patient to send recent BS readings to PCP for review via My Chart message, and wife and patient verbalized understanding.  DM zone education and importance of routine monitoring as well as need to keep BS controlled and DM managed briefly reviewed.  ACM reviewed importance of routine activity as well as diet education and importance of ongoing diet adherence to assist with BS control.  Patient and wife acknowledged understanding.  Patient and wife denied any other questions, concerns, or needs and they were encouraged

## 2025-04-23 NOTE — TELEPHONE ENCOUNTER
Patient and wife are requesting a refill of patient's Famotidine.  They would like this sent to [facility] in [place].     Last OV: 3/17/2025  Next OV: 7/10/2025

## 2025-04-24 DIAGNOSIS — K21.9 GASTROESOPHAGEAL REFLUX DISEASE, UNSPECIFIED WHETHER ESOPHAGITIS PRESENT: ICD-10-CM

## 2025-04-24 RX ORDER — FAMOTIDINE 20 MG/1
20 TABLET, FILM COATED ORAL 2 TIMES DAILY
Qty: 180 TABLET | Refills: 0 | OUTPATIENT
Start: 2025-04-24

## 2025-04-24 RX ORDER — FAMOTIDINE 20 MG/1
20 TABLET, FILM COATED ORAL 2 TIMES DAILY
Qty: 180 TABLET | Refills: 1 | Status: SHIPPED | OUTPATIENT
Start: 2025-04-24

## 2025-04-25 NOTE — CARE COORDINATION
I agree with the Care Coordinator's Plan of Care  Aware. Probably would not make changes based on that information but happy to look at sugar log.  Can schedule sooner appointment if symptoms not improving. Would be interested to know what postprandial readings are.

## 2025-05-05 ENCOUNTER — CARE COORDINATION (OUTPATIENT)
Dept: CARE COORDINATION | Age: 75
End: 2025-05-05

## 2025-05-05 NOTE — CARE COORDINATION
Ambulatory Care Coordination Note     5/5/2025 4:47 PM     Patient outreach attempt by this ACM today to perform care management follow up . ACM was unable to reach the patient by telephone today;   left voice message requesting a return phone call to this ACM.     ACM: Terri Rashid RN     Care Summary Note: Attempted to reach patient for continued Care Coordination follow up and education re: the management of his DM, Hypertension, and healthcare needs.  Will continue to work to f/u with patient in the future.     PCP/Specialist follow up:   Future Appointments         Provider Specialty Dept Phone    6/12/2025 9:40 AM Emelina De La Rosa APRN - [facility] [phone]    7/10/2025 10:00 AM Jess Vincent MD Family Medicine [phone]    1/9/2026 11:00 AM Gennaro Hackett MD Cardiology [phone]            Follow Up:   Plan for next AC outreach in approximately 1 week to complete:  - Disease specific assessments - DM and Hypertension   - Advance care planning - Review Completed    - Goal progression  - Education   - RPM - Pt stable at this time  - Monitor for additional needs  - If BS/DM improved and pt stable will discharge from Care Coordination in the near future

## 2025-05-06 DIAGNOSIS — E11.65 TYPE 2 DIABETES MELLITUS WITH HYPERGLYCEMIA, WITH LONG-TERM CURRENT USE OF INSULIN (HCC): ICD-10-CM

## 2025-05-06 DIAGNOSIS — Z79.4 TYPE 2 DIABETES MELLITUS WITH HYPERGLYCEMIA, WITH LONG-TERM CURRENT USE OF INSULIN (HCC): ICD-10-CM

## 2025-05-06 DIAGNOSIS — I10 ESSENTIAL HYPERTENSION: ICD-10-CM

## 2025-05-08 RX ORDER — CARVEDILOL 25 MG/1
25 TABLET ORAL 2 TIMES DAILY
Qty: 180 TABLET | Refills: 1 | Status: SHIPPED | OUTPATIENT
Start: 2025-05-08

## 2025-05-09 ENCOUNTER — CARE COORDINATION (OUTPATIENT)
Dept: CARE COORDINATION | Age: 75
End: 2025-05-09

## 2025-05-09 NOTE — CARE COORDINATION
Ambulatory Care Coordination Note     2025 3:07 PM     Patient Current Location:  [place]     AC contacted the patient by telephone. Verified name and  with patient as identifiers.         ACM: Terri Rashid RN     Challenges to be reviewed by the provider   Additional needs identified to be addressed with provider Yes    Patient shared he continues to have c/o increased Fasting BS readings as recent readings have been in the 200-230 range.  Patient denies any change in diet, concerns of increased stress, and/or signs/symptoms of infection being present.  Patient shared he has also been more active recently.  Pt reported he has stopped Glimepiride recently as directed and he is currently taking Metformin, Cinnamon, and Mounjaro 12.5 mg as directed.  Pt also had recent c/o drop in BP with activity and intermittent c/o \"lightheadedness.\"     Patient would like appointment to discuss recent concerns. Please advise and assist with scheduling.  Thank you!         Method of communication with provider: chart routing.    Utilization: Patient has not had any utilization since our last call.     Care Summary Note: Patient was called for continued Care Coordination follow up and education re: the management of his DM, Hypertension, and healthcare needs.  Patient shared he feels he needs to schedule an appointment as he is concerned with ongoing increased BS reading concerns as well as intermittent lightheadedness and recent drop in BP while working outside.  PCP updated as [facility] unable to schedule appointment with PCP in the near future.  Patient shared fasting BS have been in the 200 range recently with highest readings in the 200-230 range.  Patient denies any change in activity, lifestyle, or diet and admits to increased activity so unsure of the reason for increased BS readings.  Patient reported he stopped Glimepiride as recently instructed and he is unsure if increased BS started prior to that time or since

## 2025-05-12 ENCOUNTER — OFFICE VISIT (OUTPATIENT)
Dept: FAMILY MEDICINE CLINIC | Age: 75
End: 2025-05-12
Payer: MEDICARE

## 2025-05-12 VITALS
SYSTOLIC BLOOD PRESSURE: 136 MMHG | OXYGEN SATURATION: 94 % | HEART RATE: 65 BPM | RESPIRATION RATE: 18 BRPM | DIASTOLIC BLOOD PRESSURE: 62 MMHG | WEIGHT: 181.6 LBS | BODY MASS INDEX: 26.06 KG/M2

## 2025-05-12 DIAGNOSIS — E11.65 TYPE 2 DIABETES MELLITUS WITH HYPERGLYCEMIA, WITH LONG-TERM CURRENT USE OF INSULIN (HCC): ICD-10-CM

## 2025-05-12 DIAGNOSIS — I95.9 HYPOTENSION, UNSPECIFIED HYPOTENSION TYPE: ICD-10-CM

## 2025-05-12 DIAGNOSIS — I10 ESSENTIAL HYPERTENSION: ICD-10-CM

## 2025-05-12 DIAGNOSIS — Z79.4 TYPE 2 DIABETES MELLITUS WITH HYPERGLYCEMIA, WITH LONG-TERM CURRENT USE OF INSULIN (HCC): ICD-10-CM

## 2025-05-12 PROCEDURE — G8417 CALC BMI ABV UP PARAM F/U: HCPCS | Performed by: FAMILY MEDICINE

## 2025-05-12 PROCEDURE — 2022F DILAT RTA XM EVC RTNOPTHY: CPT | Performed by: FAMILY MEDICINE

## 2025-05-12 PROCEDURE — 1123F ACP DISCUSS/DSCN MKR DOCD: CPT | Performed by: FAMILY MEDICINE

## 2025-05-12 PROCEDURE — 3017F COLORECTAL CA SCREEN DOC REV: CPT | Performed by: FAMILY MEDICINE

## 2025-05-12 PROCEDURE — 3051F HG A1C>EQUAL 7.0%<8.0%: CPT | Performed by: FAMILY MEDICINE

## 2025-05-12 PROCEDURE — G2211 COMPLEX E/M VISIT ADD ON: HCPCS | Performed by: FAMILY MEDICINE

## 2025-05-12 PROCEDURE — 1036F TOBACCO NON-USER: CPT | Performed by: FAMILY MEDICINE

## 2025-05-12 PROCEDURE — 99214 OFFICE O/P EST MOD 30 MIN: CPT | Performed by: FAMILY MEDICINE

## 2025-05-12 PROCEDURE — 3078F DIAST BP <80 MM HG: CPT | Performed by: FAMILY MEDICINE

## 2025-05-12 PROCEDURE — G8427 DOCREV CUR MEDS BY ELIG CLIN: HCPCS | Performed by: FAMILY MEDICINE

## 2025-05-12 PROCEDURE — 3075F SYST BP GE 130 - 139MM HG: CPT | Performed by: FAMILY MEDICINE

## 2025-05-12 PROCEDURE — 1159F MED LIST DOCD IN RCRD: CPT | Performed by: FAMILY MEDICINE

## 2025-05-12 ASSESSMENT — ENCOUNTER SYMPTOMS
ABDOMINAL PAIN: 0
ABDOMINAL DISTENTION: 0

## 2025-05-12 NOTE — PROGRESS NOTES
Bahman Ruelas is a 75 y.o. male who presents today for:  Chief Complaint   Patient presents with    Blood Sugar Problem     Been high in the morning     Blood Pressure Check     Has been dropping low, when outside working. For a couple weeks, not everyday      HPI:   Bahman Ruelas is 75 y.o. who presents today for acute care visit today.    Patient c/o drop in BP with activity as well as intermittent lightheadedness.    HTN:  on Carvedilol, Doxazosin, Candesartan and Bumex PRN (takes every other  day for leg swelling as needed)  Reports BP dropping with activity in the 80s/60s x 2-3 weeks while doing gardening or other construction activities around the house.  Associated with lightheadedness only.  Denies any blurry vision, HA, CP, SOB, worsening leg swelling (wears compression stockings), syncopal/pre-syncopal episodes.  Blood glu ranging WNL during these episodes.  No low episodes noted during this time.    Drinking 5-6 x 500mL throughout the day.  Has lost ~4-5 lbs since increasing dose of Mounjaro.    DM:  on Metformin 2500 QD, Mounjaro 15mg/week  Last HbA1c done on 3/7 was 7.9%blood glu ranging in 200 in the AM.  Does not eat food after 8pm.    No other acute concerns or complaints noted.    Objective:     Vitals:    05/12/25 1435   BP: 136/62   BP Site: Right Upper Arm   Patient Position: Sitting   Pulse: 65   Resp: 18   SpO2: 94%   Weight: 82.4 kg (181 lb 9.6 oz)       Wt Readings from Last 3 Encounters:   05/12/25 82.4 kg (181 lb 9.6 oz)   03/17/25 83.5 kg (184 lb)   02/17/25 81.9 kg (180 lb 9.6 oz)     Wt Readings from Last 10 Encounters:   05/12/25 82.4 kg (181 lb 9.6 oz)   03/17/25 83.5 kg (184 lb)   02/17/25 81.9 kg (180 lb 9.6 oz)   01/27/25 82.6 kg (182 lb)   01/03/25 82.6 kg (182 lb)   12/19/24 83.6 kg (184 lb 6.4 oz)   11/12/24 84.8 kg (187 lb)   10/11/24 84.8 kg (186 lb 14.4 oz)   09/18/24 85 kg (187 lb 6.4 oz)   06/26/24 84.5 kg (186 lb 3.2 oz)       BP Readings from Last 3 Encounters:

## 2025-05-12 NOTE — PROGRESS NOTES
Attending attestation:  I personally performed and participated key or critical portions of the evaluation and management including personally performing the exam and medical decision making.  I verify the accuracy of the documentation by the resident with the following addition or changes:    History of Present Illness  The patient is a 75-year-old male who presents for evaluation of hypotension and diabetes.    He reports experiencing a decrease in blood pressure during physical activities such as gardening or house construction, accompanied by intermittent lightheadedness. His blood pressure readings have been consistently low, averaging in the 80s over 60s. He does not experience chest pain, headaches, syncope, presyncope, or any other symptoms. He also reports no changes in vision. He maintains adequate hydration throughout the day. He reports no urinary issues or nocturia. He experiences significant fluid retention in his legs, which he manages with Lasix and compression stockings. He reports no chest pain, shortness of breath, or nausea, but does report fatigue and dizziness. He is on carvedilol, doxazosin, candesartan, and Bumex for his hypertension. He takes doxazosin for his blood pressure. He uses Bumex every 2 days as needed and also uses compression stockings. He has no history of heart disease or heart failure. His last echocardiogram and stress test, conducted within the past 3 to 4 years, were normal.    His blood glucose levels range from 160s to 200s. He has experienced a weight loss of approximately 4 to 5 pounds since his last visit, which coincided with an increase in his Mounjaro dosage. He reports no unintentional weight loss. His morning blood sugar levels are elevated, reaching up to 219. His last A1c was 7.9. He is on metformin 2500 mg and Mounjaro 15 mg, which was increased from 7 mg.     Physical Exam  Respiratory: Clear to auscultation, no wheezing, rales or rhonchi   Physical

## 2025-05-12 NOTE — CARE COORDINATION
I agree with the Care Coordinator's Plan of Care  Patient is scheduled to be seen by me today.

## 2025-05-15 ENCOUNTER — TELEPHONE (OUTPATIENT)
Dept: FAMILY MEDICINE CLINIC | Age: 75
End: 2025-05-15

## 2025-05-15 ENCOUNTER — CARE COORDINATION (OUTPATIENT)
Dept: CARE COORDINATION | Age: 75
End: 2025-05-15

## 2025-05-15 DIAGNOSIS — E11.69 TYPE 2 DIABETES MELLITUS WITH OTHER SPECIFIED COMPLICATION, WITHOUT LONG-TERM CURRENT USE OF INSULIN (HCC): ICD-10-CM

## 2025-05-15 RX ORDER — CANDESARTAN 32 MG/1
32 TABLET ORAL DAILY
Qty: 90 TABLET | Refills: 1 | Status: SHIPPED | OUTPATIENT
Start: 2025-05-15

## 2025-05-15 NOTE — TELEPHONE ENCOUNTER
Are there any other symptoms?  If focal symptoms suggesting infection or other cause, should be seen.  If just low blood pressure and otherwise feeling okay except for fatigue could cut Atacand in half. Sometimes we do need to back off on blood pressure medications as people lose weight on Mounjaro.  Keep me updated and come in for visit if worsening or focal symptoms or if blood pressure not coming back up to over 100 for the top number.

## 2025-05-15 NOTE — CARE COORDINATION
Ambulatory Care Coordination Note     5/15/2025 11:10 AM     Patient Current Location:  [place]     ACM contacted the patient by telephone. Verified name and  with patient as identifiers.         ACM: Terri Rashid RN     Challenges to be reviewed by the provider   Additional needs identified to be addressed with provider No    none         Method of communication with provider: none.    Utilization: Patient has not had any utilization since our last call.     Care Summary Note: Patient was called for continued Care Coordination follow up and education re: the management of his DM, Hypertension, and healthcare needs.  Patient shared he is doing \"ok\" and he denied any questions re: recent PCP visit.  Patient shared he continues to monitor BS approx 2 x a day and BS is slowly improving over the last couple of days.  BS was 173 his AM and 143 last PM.  Patient denied any recent c/o hypoglycemia or hyperglycemia symptoms being present.  Importance of routine monitoring as well as what to report and when to follow up was reviewed with patient and patient acknowledged understanding.  DM diet education was also briefly reviewed with patient and he verbalized understanding.  Patient shared he is aware of Endo f/u next month and ACM reviewed the importance of bringing BS log with him to appointment and writing down questions/concerns.  Patient verbalized understanding.  Patient shared recent hypotension concerns have also slightly improved and ACM reviewed importance of pushing fluids as directed, ramesh in warmer weather, as well as need to wear compression socks/hose as directed to help prevent hypotension with activity.  Patient verbalized understanding.  Patient denied any other questions, concerns, or needs and he was encouraged to call with any that may develop.      Offered patient enrollment in the Remote Patient Monitoring (RPM) program for in-home monitoring: Yes, but did not enroll at this time: controlled chronic

## 2025-05-15 NOTE — TELEPHONE ENCOUNTER
Is mounjaro causing low blood pressure? Since upping the mounjaro BP is running low- not feeling well -    Today 91/51.     Declined visit as was just here on Monday

## 2025-05-15 NOTE — TELEPHONE ENCOUNTER
Patient's last appointment was : 5/12/2025  Patient's next appointment is : 7/10/2025  Last refilled: have not filled from you

## 2025-05-16 NOTE — TELEPHONE ENCOUNTER
Aware.  Sent in rx.  Let me know if wants to back off on Mounjaro also. Weight loss is not really our goal so I would be okay with a lower dose if this is better tolerated.

## 2025-05-16 NOTE — TELEPHONE ENCOUNTER
Patient notified - states just feel like \"crap\" when BP drops- voiced that is does come back up over 100 but drops throughout day . Would liek to stay on the moujaro 12.5 mg - will need another script called in-   Will keep us updated

## 2025-05-19 ENCOUNTER — TELEPHONE (OUTPATIENT)
Dept: FAMILY MEDICINE CLINIC | Age: 75
End: 2025-05-19

## 2025-05-19 NOTE — TELEPHONE ENCOUNTER
Spoke with patient- he has NOT cut his atacand in half- watching his BP   BP today was 116/61   Last night 141 70. He did not take his last dose of mounjaro 12.5 mg. Would like you to send in a new script of mounjaro 10 mg. Believes all of this started when the mounjaro was upped.

## 2025-05-19 NOTE — TELEPHONE ENCOUNTER
Per wife, regarding Mounjaro, dosage was increased and his BP was dropping.  One BP med was stopped and one was cut in half.  Patient is not feeling well on the increased dosage and is requesting it be dropped back to 10. Patient was feeling well/normal at the dose of 10.  Is that ok? If ok patient needs a new prescription for the 10 sent to [facility], [place].     Please advise.  Patient would like a call back.  Thank you.

## 2025-05-19 NOTE — TELEPHONE ENCOUNTER
Aware.  This is reasonable. Sent in rx for Mounjaro 10 mg. Keep me updated. We can back off further if needed. Thanks.

## 2025-05-27 ENCOUNTER — CARE COORDINATION (OUTPATIENT)
Dept: CARE COORDINATION | Age: 75
End: 2025-05-27

## 2025-06-04 DIAGNOSIS — R09.89 THROAT CLEARING: ICD-10-CM

## 2025-06-04 DIAGNOSIS — K21.9 GASTROESOPHAGEAL REFLUX DISEASE, UNSPECIFIED WHETHER ESOPHAGITIS PRESENT: ICD-10-CM

## 2025-06-04 NOTE — TELEPHONE ENCOUNTER
Patient's last appointment was : 5/12/2025  Patient's next appointment is : 7/10/2025  Last refilled:    03/10/2025 Protonix 40 mg, 90 tabs with 0 refills.

## 2025-06-05 RX ORDER — PANTOPRAZOLE SODIUM 40 MG/1
40 TABLET, DELAYED RELEASE ORAL DAILY
Qty: 90 TABLET | Refills: 0 | Status: SHIPPED | OUTPATIENT
Start: 2025-06-05

## 2025-06-09 ENCOUNTER — CARE COORDINATION (OUTPATIENT)
Dept: CARE COORDINATION | Age: 75
End: 2025-06-09

## 2025-06-10 NOTE — CARE COORDINATION
I agree with the Care Coordinator's Plan of Care  Reasonable to have re-started glimepiride.      
weeks  to complete:  - Disease specific assessments - DM and Hypertension   - Advance care planning - Review Completed    - Goal progression  - Education   - RPM - Pt monitoring on his own  - Monitor for possible dietician referral  - Monitor for additional needs  - If BS/DM improve and pt stable will discharge from Care Coordination in the near future     Patient  is agreeable to this plan.

## 2025-06-12 ENCOUNTER — OFFICE VISIT (OUTPATIENT)
Age: 75
End: 2025-06-12
Payer: MEDICARE

## 2025-06-12 VITALS
HEIGHT: 70 IN | SYSTOLIC BLOOD PRESSURE: 138 MMHG | BODY MASS INDEX: 26.2 KG/M2 | TEMPERATURE: 97.7 F | WEIGHT: 183 LBS | HEART RATE: 60 BPM | DIASTOLIC BLOOD PRESSURE: 80 MMHG | OXYGEN SATURATION: 97 %

## 2025-06-12 DIAGNOSIS — G47.33 OSA TREATED WITH BIPAP: Primary | ICD-10-CM

## 2025-06-12 DIAGNOSIS — R07.9 CHEST PAIN, UNSPECIFIED TYPE: ICD-10-CM

## 2025-06-12 DIAGNOSIS — R51.9 FREQUENT HEADACHES: ICD-10-CM

## 2025-06-12 PROCEDURE — 1123F ACP DISCUSS/DSCN MKR DOCD: CPT | Performed by: NURSE PRACTITIONER

## 2025-06-12 PROCEDURE — 1160F RVW MEDS BY RX/DR IN RCRD: CPT | Performed by: NURSE PRACTITIONER

## 2025-06-12 PROCEDURE — 3075F SYST BP GE 130 - 139MM HG: CPT | Performed by: NURSE PRACTITIONER

## 2025-06-12 PROCEDURE — G8427 DOCREV CUR MEDS BY ELIG CLIN: HCPCS | Performed by: NURSE PRACTITIONER

## 2025-06-12 PROCEDURE — 99214 OFFICE O/P EST MOD 30 MIN: CPT | Performed by: NURSE PRACTITIONER

## 2025-06-12 PROCEDURE — 3017F COLORECTAL CA SCREEN DOC REV: CPT | Performed by: NURSE PRACTITIONER

## 2025-06-12 PROCEDURE — G8417 CALC BMI ABV UP PARAM F/U: HCPCS | Performed by: NURSE PRACTITIONER

## 2025-06-12 PROCEDURE — 1036F TOBACCO NON-USER: CPT | Performed by: NURSE PRACTITIONER

## 2025-06-12 PROCEDURE — 3079F DIAST BP 80-89 MM HG: CPT | Performed by: NURSE PRACTITIONER

## 2025-06-12 PROCEDURE — 1159F MED LIST DOCD IN RCRD: CPT | Performed by: NURSE PRACTITIONER

## 2025-06-12 NOTE — PROGRESS NOTES
[facility]       Bahman Ruelas         [id number]  6/12/2025   Chief Complaint   Patient presents with    Follow-up     1 year LEW with download         Pt of Dr. Betsy ORLANDO Download:   Original or initial AHI: 28.7     Date of initial study: 8.19.2015      Compliant  100%     Noncompliant 0 %     PAP Type Spont Level  19/15 cm H2O   Avg Hrs/Day 6hrs 55m  AHI: 0.4   Leaks : 95 th percentile: 25.1   Recorded compliance dates , 5.12.25  to 6.10.25   Machine/Mfg:   [x] ResMed    [] Respironics/Dreamstation   Interface:   [] Nasal    [] Nasal pillows   [x] FFM      Provider:      [x] [facility]     [][facility]     [] [employer]    [] [facility]    [] [facility]               [] [facility]      [] [employer]    [] [place]:      [] Other    Neck Size: 16  Mallampati 4  ESS:  5  SAQLI: 83    Here is a scan of the most recent download:                    Results  - Testing:    - EKG (January): Normal  '  Presentation:   History of Present Illness  The patient is a 75-year-old male who presents for a 1-year obstructive sleep apnea follow-up.    Chest Discomfort  He reports experiencing chest discomfort upon awakening, characterized by rapid, shallow breathing. This discomfort is described as a dull, achy sensation across the anterior chest, which occasionally disrupts his sleep. The pain typically subsides after approximately 30 minutes of wakefulness. He notes that these episodes were more frequent during the winter and spring seasons but have since decreased in frequency. He also mentions an increase in physical activity recently. He recalls a diagnosis of COVID-19 during the winter but reports no instances of pneumonia or influenza. He admits to a mild cough and persistent phlegm in the posterior pharynx. He has sought consultation from an ENT specialist and a gastroenterologist, both of whom found no abnormalities from the stomach upwards. He is under the care of a cardiologist who has advised him to return

## 2025-06-19 ENCOUNTER — HOSPITAL ENCOUNTER (OUTPATIENT)
Dept: RESPIRATORY THERAPY | Age: 75
Discharge: HOME | End: 2025-06-19
Payer: MEDICARE

## 2025-06-19 DIAGNOSIS — G47.33 OSA TREATED WITH BIPAP: ICD-10-CM

## 2025-06-19 PROCEDURE — 94762 N-INVAS EAR/PLS OXIMTRY CONT: CPT

## 2025-06-19 NOTE — PROGRESS NOTES
Instructions were given for an overnight nocturnal pulse oximetry study. The assigned GBA number of the pulse oximetry was [id number] [id number].  A log sheet was completed. Patient was instructed on documenting any events that occurred throughout the night on the log sheet. The procedure was explained to the learner(s). Patient understanding of the procedure was excellent.     Patient does have a mean of transportation to bring back the study the next day. A patient task was placed in the patient’s chart for the  to download the nocturnal study and fax the results to the ordering provider for interpretation. The pulse oximetry’s memory was cleared. Patient had no questions and was sent home with the pulse oximeter.

## 2025-06-24 ENCOUNTER — RESULTS FOLLOW-UP (OUTPATIENT)
Age: 75
End: 2025-06-24

## 2025-06-26 RX ORDER — GLIMEPIRIDE 4 MG/1
4 TABLET ORAL 2 TIMES DAILY
Qty: 180 TABLET | Refills: 1 | Status: SHIPPED | OUTPATIENT
Start: 2025-06-26

## 2025-06-26 NOTE — TELEPHONE ENCOUNTER
Patient's last appointment was : 5/12/2025  Patient's next appointment is : 7/10/2025  Last refilled: 11/7/24

## 2025-07-02 ENCOUNTER — CARE COORDINATION (OUTPATIENT)
Dept: CARE COORDINATION | Age: 75
End: 2025-07-02

## 2025-07-02 NOTE — CARE COORDINATION
Ambulatory Care Coordination Note     2025 2:18 PM     Patient Current Location:  Home: [address] [id number]     ACM contacted the patient by telephone. Verified name and  with patient as identifiers.     Patient graduated from the High Risk Care Management program on 2025.  Patient verbalizes confidence in the ability to self-manage at this time..  Care management goals have been completed. No further Ambulatory Care Manager follow up scheduled.      ACM: Terri Rashid RN     Challenges to be reviewed by the provider   Additional needs identified to be addressed with provider No    None          Method of communication with provider: none.    Utilization: Patient has not had any utilization since our last call.     Care Summary Note: Patient was called for continued Care Coordination follow up and education re: the management of his DM, Hypertension, and healthcare needs.  Patient shared he has been doing well and he denied any new or worsening of previous symptoms.  Patient shared he is monitoring his BS and they have remained stable.  Patient denied any recent c/o hypoglycemia or hyperglycemia symptoms being present.  Patient shared BP has also been stable with recent readings in the 130/70 range.  Importance of ongoing monitoring as well as the need to share recent readings with providers was reviewed with patient and he verbalized understanding.  Zone education and importance of ongoing monitoring as well as what to report and when to follow up was reviewed with patient and he verbalized understanding.  Patient was also educated on the need to call for earlier appointments with any new or worsening of his symptoms.  Patient again verbalized understanding.  ACM reviewed the importance of routine eye and foot exams and patient verbalized understanding.  Upcoming appointment information was also reviewed with patient and patient was encouraged to write down any questions/concerns and

## 2025-07-05 ENCOUNTER — HOSPITAL ENCOUNTER (OUTPATIENT)
Age: 75
Discharge: HOME | End: 2025-07-05
Payer: MEDICARE

## 2025-07-05 LAB
ALT SERPL W/O P-5'-P-CCNC: 25 U/L (ref 10–50)
ANION GAP SERPL CALC-SCNC: 9 MEQ/L (ref 8–16)
BUN SERPL-MCNC: 19 MG/DL (ref 8–23)
CALCIUM SERPL-MCNC: 11.2 MG/DL (ref 8.8–10.2)
CHLORIDE SERPL-SCNC: 106 MEQ/L (ref 98–111)
CO2 SERPL-SCNC: 27 MEQ/L (ref 22–29)
CREAT SERPL-MCNC: 1.1 MG/DL (ref 0.7–1.2)
DEPRECATED MEAN GLUCOSE BLD GHB EST-ACNC: 189 MG/DL (ref 70–126)
GFR SERPL CREATININE-BSD FRML MDRD: 70 ML/MIN/1.73M2
GLUCOSE SERPL-MCNC: 167 MG/DL (ref 74–109)
HBA1C MFR BLD HPLC: 8.3 % (ref 4–6)
POTASSIUM SERPL-SCNC: 5.2 MEQ/L (ref 3.5–5.2)
SODIUM SERPL-SCNC: 142 MEQ/L (ref 135–145)

## 2025-07-05 PROCEDURE — 83036 HEMOGLOBIN GLYCOSYLATED A1C: CPT

## 2025-07-05 PROCEDURE — 80048 BASIC METABOLIC PNL TOTAL CA: CPT

## 2025-07-05 PROCEDURE — 36415 COLL VENOUS BLD VENIPUNCTURE: CPT

## 2025-07-05 PROCEDURE — 84460 ALANINE AMINO (ALT) (SGPT): CPT

## 2025-07-10 ENCOUNTER — OFFICE VISIT (OUTPATIENT)
Dept: FAMILY MEDICINE CLINIC | Age: 75
End: 2025-07-10

## 2025-07-10 ENCOUNTER — TELEPHONE (OUTPATIENT)
Dept: FAMILY MEDICINE CLINIC | Age: 75
End: 2025-07-10

## 2025-07-10 VITALS
BODY MASS INDEX: 26.4 KG/M2 | OXYGEN SATURATION: 96 % | DIASTOLIC BLOOD PRESSURE: 72 MMHG | WEIGHT: 184 LBS | SYSTOLIC BLOOD PRESSURE: 132 MMHG | HEART RATE: 57 BPM | RESPIRATION RATE: 14 BRPM

## 2025-07-10 DIAGNOSIS — I10 PRIMARY HYPERTENSION: ICD-10-CM

## 2025-07-10 DIAGNOSIS — E11.65 TYPE 2 DIABETES MELLITUS WITH HYPERGLYCEMIA, WITH LONG-TERM CURRENT USE OF INSULIN (HCC): ICD-10-CM

## 2025-07-10 DIAGNOSIS — E78.5 HYPERLIPIDEMIA, UNSPECIFIED HYPERLIPIDEMIA TYPE: ICD-10-CM

## 2025-07-10 DIAGNOSIS — E83.52 HYPERCALCEMIA: ICD-10-CM

## 2025-07-10 DIAGNOSIS — G47.33 OSA TREATED WITH BIPAP: ICD-10-CM

## 2025-07-10 DIAGNOSIS — R07.89 CHEST HEAVINESS: Primary | ICD-10-CM

## 2025-07-10 DIAGNOSIS — Z79.4 TYPE 2 DIABETES MELLITUS WITH HYPERGLYCEMIA, WITH LONG-TERM CURRENT USE OF INSULIN (HCC): ICD-10-CM

## 2025-07-10 DIAGNOSIS — R60.9 EDEMA, UNSPECIFIED TYPE: ICD-10-CM

## 2025-07-10 DIAGNOSIS — R07.89 CHEST HEAVINESS: ICD-10-CM

## 2025-07-10 RX ORDER — ASPIRIN 81 MG/1
81 TABLET, CHEWABLE ORAL DAILY
COMMUNITY

## 2025-07-10 ASSESSMENT — ENCOUNTER SYMPTOMS
COUGH: 0
VOMITING: 0
BACK PAIN: 0
NAUSEA: 0
SORE THROAT: 0
EYE REDNESS: 0
DIARRHEA: 0
CONSTIPATION: 0
ABDOMINAL PAIN: 0
EYE DISCHARGE: 0
SHORTNESS OF BREATH: 0

## 2025-07-10 NOTE — PROGRESS NOTES
Cholecalciferol (VITAMIN D-3 PO), Take by mouth daily, Disp: , Rfl:     bumetanide (BUMEX) 0.5 MG tablet, TAKE 1 TABLET BY MOUTH ONCE DAILY AS NEEDED FOR SWELLING, Disp: 90 tablet, Rfl: 3    pravastatin (PRAVACHOL) 20 MG tablet, Take 1 tablet by mouth once daily, Disp: 90 tablet, Rfl: 3    blood glucose test strips (ASCENSIA AUTODISC VI;ONE TOUCH ULTRA TEST VI) strip, Use with associated glucose meter to test twice daily., Disp: 100 strip, Rfl: 5    Blood Glucose Monitoring Suppl (ONE TOUCH ULTRA 2) w/Device KIT, 1 kit by Does not apply route daily, Disp: 1 kit, Rfl: 0    vitamin B-12 (CYANOCOBALAMIN) 1000 MCG tablet, Take 1 tablet by mouth daily, Disp: , Rfl:     BiPAP Machine MISC, by Does not apply route, Disp: , Rfl:     Multiple Vitamins-Minerals (THERAPEUTIC MULTIVITAMIN-MINERALS) tablet, Take 1 tablet by mouth daily, Disp: , Rfl:     ONE TOUCH ULTRA TEST STRP,   , Disp: , Rfl:     Cinnamon 500 MG TABS, Take 1,000 mg by mouth daily , Disp: , Rfl:     Subjective:      Review of Systems  As per HPI.  No other symptoms reported.     Objective:     Vitals:    07/10/25 0949   BP: 132/72   Pulse: 57   Resp: 14   SpO2: 96%   Weight: 83.5 kg (184 lb)       Physical Exam  Extremities: Mild swelling in feet, pulses are good, intact sensation.  Physical Exam  Vitals reviewed.   Constitutional:       General: He is not in acute distress.     Appearance: He is well-developed. He is not ill-appearing or toxic-appearing.   HENT:      Head: Normocephalic and atraumatic.   Eyes:      Conjunctiva/sclera: Conjunctivae normal.   Cardiovascular:      Rate and Rhythm: Normal rate and regular rhythm.      Heart sounds: Normal heart sounds. No murmur heard.  Pulmonary:      Effort: Pulmonary effort is normal. No respiratory distress.      Breath sounds: Normal breath sounds. No stridor. No wheezing, rhonchi or rales.   Abdominal:      Palpations: Abdomen is soft.      Tenderness: There is no abdominal tenderness.   Musculoskeletal:

## 2025-07-10 NOTE — TELEPHONE ENCOUNTER
Patient is reporting intermittent chest heaviness; notices blood pressure is high at these times but controlled otherwise.  Not happening with exertion. Usually happening at rest. No pain but heaviness.  Happening most nights.  No current symptoms.  Appreciate input.  Thanks.

## 2025-07-10 NOTE — TELEPHONE ENCOUNTER
Could consider getting something like Coronary CTA to evaluate for these symptoms.   His workup in the past was negative.

## 2025-07-10 NOTE — PROGRESS NOTES
(Diabetes, CKD 3-4, OR last GFR 15-59)  07/05/2026    Colorectal Cancer Screen  10/25/2026    Diabetic retinal exam  03/20/2027    Shingles vaccine  Completed    Pneumococcal 50+ years Vaccine  Completed    Respiratory Syncytial Virus (RSV) Pregnant or age 60 yrs+  Completed    AAA screen  Completed    Hepatitis C screen  Completed    Hepatitis A vaccine  Aged Out    Hepatitis B vaccine  Aged Out    Hib vaccine  Aged Out    Polio vaccine  Aged Out    Meningococcal (ACWY) vaccine  Aged Out    Meningococcal B vaccine  Aged Out     The ASCVD Risk score (Axel SOL, et al., 2019) failed to calculate for the following reasons:    The valid total cholesterol range is 130 to 320 mg/dL        1/27/2025    12:42 PM 9/18/2024    10:01 AM 1/10/2024    11:55 AM 8/16/2023     8:18 AM 2/22/2023     9:15 AM 7/12/2022     9:24 AM 4/18/2022    11:56 AM   PHQ Scores   PHQ2 Score 0 0 0 0 0 0 0   PHQ9 Score 0 0 0 0 0 0 0     Interpretation of Total Score Depression Severity: 1-4 = Minimal depression, 5-9 = Mild depression, 10-14 = Moderate depression, 15-19 = Moderately severe depression, 20-27 = Severe depression   Medications     Current Outpatient Medications   Medication Sig Dispense Refill    aspirin 81 MG chewable tablet Take 1 tablet by mouth daily      glimepiride (AMARYL) 4 MG tablet Take 1 tablet by mouth in the morning and at bedtime 180 tablet 1    pantoprazole (PROTONIX) 40 MG tablet Take 1 tablet by mouth daily 90 tablet 0    candesartan (ATACAND) 32 MG tablet Take 1 tablet by mouth daily 90 tablet 1    metFORMIN (GLUCOPHAGE) 1000 MG tablet TAKE 1 TABLET BY MOUTH TWICE DAILY WITH MEALS 180 tablet 1    carvedilol (COREG) 25 MG tablet Take 1 tablet by mouth twice daily 180 tablet 1    metFORMIN (GLUCOPHAGE) 500 MG tablet TAKE 1 TABLET BY MOUTH ONCE DAILY IN THE EVENING 90 tablet 1    famotidine (PEPCID) 20 MG tablet Take 1 tablet by mouth 2 times daily 180 tablet 1    Cholecalciferol (VITAMIN D-3 PO) Take by mouth daily

## 2025-07-15 ENCOUNTER — TRANSCRIBE ORDERS (OUTPATIENT)
Dept: ADMINISTRATIVE | Age: 75
End: 2025-07-15

## 2025-07-15 DIAGNOSIS — R07.89 CHEST HEAVINESS: Primary | ICD-10-CM

## 2025-07-18 ENCOUNTER — TELEPHONE (OUTPATIENT)
Dept: FAMILY MEDICINE CLINIC | Age: 75
End: 2025-07-18

## 2025-07-18 ENCOUNTER — HOSPITAL ENCOUNTER (OUTPATIENT)
Dept: CT IMAGING | Age: 75
Discharge: HOME | End: 2025-07-18
Attending: FAMILY MEDICINE
Payer: MEDICARE

## 2025-07-18 DIAGNOSIS — R07.89 CHEST HEAVINESS: ICD-10-CM

## 2025-07-18 DIAGNOSIS — R07.89 CHEST HEAVINESS: Primary | ICD-10-CM

## 2025-07-18 PROCEDURE — 75571 CT HRT W/O DYE W/CA TEST: CPT

## 2025-07-18 NOTE — TELEPHONE ENCOUNTER
[facility] care [place] calling to update order. Patient needs a CT Cardiac Calcium scoring ordered. New order placed

## 2025-07-24 ENCOUNTER — HOSPITAL ENCOUNTER (OUTPATIENT)
Dept: CT IMAGING | Age: 75
Discharge: HOME | End: 2025-07-24
Attending: FAMILY MEDICINE
Payer: MEDICARE

## 2025-07-24 DIAGNOSIS — R07.89 CHEST HEAVINESS: ICD-10-CM

## 2025-07-24 PROCEDURE — 75571 CT HRT W/O DYE W/CA TEST: CPT

## 2025-08-28 ENCOUNTER — OFFICE VISIT (OUTPATIENT)
Dept: FAMILY MEDICINE CLINIC | Age: 75
End: 2025-08-28

## 2025-08-28 ENCOUNTER — HOSPITAL ENCOUNTER (OUTPATIENT)
Dept: GENERAL RADIOLOGY | Age: 75
Discharge: HOME | End: 2025-08-28
Payer: MEDICARE

## 2025-08-28 VITALS
HEART RATE: 60 BPM | BODY MASS INDEX: 26.75 KG/M2 | SYSTOLIC BLOOD PRESSURE: 180 MMHG | WEIGHT: 186.4 LBS | RESPIRATION RATE: 16 BRPM | OXYGEN SATURATION: 96 % | DIASTOLIC BLOOD PRESSURE: 80 MMHG

## 2025-08-28 DIAGNOSIS — M79.641 PAIN IN BOTH HANDS: ICD-10-CM

## 2025-08-28 DIAGNOSIS — M79.642 PAIN IN BOTH HANDS: ICD-10-CM

## 2025-08-28 DIAGNOSIS — E11.65 TYPE 2 DIABETES MELLITUS WITH HYPERGLYCEMIA, WITH LONG-TERM CURRENT USE OF INSULIN (HCC): ICD-10-CM

## 2025-08-28 DIAGNOSIS — R51.9 NONINTRACTABLE EPISODIC HEADACHE, UNSPECIFIED HEADACHE TYPE: ICD-10-CM

## 2025-08-28 DIAGNOSIS — Z79.4 TYPE 2 DIABETES MELLITUS WITH HYPERGLYCEMIA, WITH LONG-TERM CURRENT USE OF INSULIN (HCC): ICD-10-CM

## 2025-08-28 DIAGNOSIS — K21.9 GASTROESOPHAGEAL REFLUX DISEASE, UNSPECIFIED WHETHER ESOPHAGITIS PRESENT: ICD-10-CM

## 2025-08-28 DIAGNOSIS — E83.52 HYPERCALCEMIA: ICD-10-CM

## 2025-08-28 DIAGNOSIS — I10 PRIMARY HYPERTENSION: ICD-10-CM

## 2025-08-28 DIAGNOSIS — M65.30 TRIGGER FINGER, UNSPECIFIED FINGER, UNSPECIFIED LATERALITY: ICD-10-CM

## 2025-08-28 DIAGNOSIS — R09.89 THROAT CLEARING: ICD-10-CM

## 2025-08-28 LAB
ALBUMIN SERPL BCG-MCNC: 4.1 G/DL (ref 3.4–4.9)
ALP SERPL-CCNC: 59 U/L (ref 40–129)
ALT SERPL W/O P-5'-P-CCNC: 18 U/L (ref 10–50)
ANION GAP SERPL CALC-SCNC: 9 MEQ/L (ref 8–16)
AST SERPL-CCNC: 20 U/L (ref 10–50)
BASOPHILS ABSOLUTE: 0.1 THOU/MM3 (ref 0–0.1)
BASOPHILS NFR BLD AUTO: 0.9 %
BILIRUB SERPL-MCNC: 0.2 MG/DL (ref 0.3–1.2)
BUN SERPL-MCNC: 17 MG/DL (ref 8–23)
CALCIUM IONIZED: 1.41 MMOL/L (ref 1.12–1.32)
CALCIUM SERPL-MCNC: 10.8 MG/DL (ref 8.5–10.5)
CHLORIDE SERPL-SCNC: 108 MEQ/L (ref 98–111)
CO2 SERPL-SCNC: 29 MEQ/L (ref 22–29)
CREAT SERPL-MCNC: 1.1 MG/DL (ref 0.7–1.2)
CRP SERPL-MCNC: < 0.3 MG/DL (ref 0–0.5)
DEPRECATED RDW RBC AUTO: 44.7 FL (ref 35–45)
EOSINOPHIL NFR BLD AUTO: 1.8 %
EOSINOPHILS ABSOLUTE: 0.1 THOU/MM3 (ref 0–0.4)
ERYTHROCYTE [DISTWIDTH] IN BLOOD BY AUTOMATED COUNT: 13.5 % (ref 11.5–14.5)
ERYTHROCYTE [SEDIMENTATION RATE] IN BLOOD BY WESTERGREN METHOD: 7 MM/HR (ref 0–10)
GFR SERPL CREATININE-BSD FRML MDRD: 70 ML/MIN/1.73M2
GLUCOSE SERPL-MCNC: 106 MG/DL (ref 74–109)
HCT VFR BLD AUTO: 38.4 % (ref 42–52)
HGB BLD-MCNC: 11.9 GM/DL (ref 14–18)
IMM GRANULOCYTES # BLD AUTO: 0.02 THOU/MM3 (ref 0–0.07)
IMM GRANULOCYTES NFR BLD AUTO: 0.3 %
LYMPHOCYTES ABSOLUTE: 2.8 THOU/MM3 (ref 1–4.8)
LYMPHOCYTES NFR BLD AUTO: 37.4 %
MCH RBC QN AUTO: 28.2 PG (ref 26–33)
MCHC RBC AUTO-ENTMCNC: 31 GM/DL (ref 32.2–35.5)
MCV RBC AUTO: 91 FL (ref 80–94)
MONOCYTES ABSOLUTE: 0.7 THOU/MM3 (ref 0.4–1.3)
MONOCYTES NFR BLD AUTO: 9.2 %
NEUTROPHILS ABSOLUTE: 3.8 THOU/MM3 (ref 1.8–7.7)
NEUTROPHILS NFR BLD AUTO: 50.4 %
NRBC BLD AUTO-RTO: 0 /100 WBC
PLATELET # BLD AUTO: 260 THOU/MM3 (ref 130–400)
PMV BLD AUTO: 10.9 FL (ref 9.4–12.4)
POTASSIUM SERPL-SCNC: 4.9 MEQ/L (ref 3.5–5.2)
PROT SERPL-MCNC: 6.6 G/DL (ref 6.4–8.3)
RBC # BLD AUTO: 4.22 MILL/MM3 (ref 4.7–6.1)
RHEUMATOID FACT SERPL-ACNC: < 10 IU/ML (ref 0–13)
SODIUM SERPL-SCNC: 146 MEQ/L (ref 135–145)
WBC # BLD AUTO: 7.6 THOU/MM3 (ref 4.8–10.8)

## 2025-08-28 PROCEDURE — 86140 C-REACTIVE PROTEIN: CPT

## 2025-08-28 PROCEDURE — 80053 COMPREHEN METABOLIC PANEL: CPT

## 2025-08-28 PROCEDURE — 86430 RHEUMATOID FACTOR TEST QUAL: CPT

## 2025-08-28 PROCEDURE — 36415 COLL VENOUS BLD VENIPUNCTURE: CPT

## 2025-08-28 PROCEDURE — 85651 RBC SED RATE NONAUTOMATED: CPT

## 2025-08-28 PROCEDURE — 85025 COMPLETE CBC W/AUTO DIFF WBC: CPT

## 2025-08-28 PROCEDURE — 86038 ANTINUCLEAR ANTIBODIES: CPT

## 2025-08-28 PROCEDURE — 86200 CCP ANTIBODY: CPT

## 2025-08-28 PROCEDURE — 82330 ASSAY OF CALCIUM: CPT

## 2025-08-28 RX ORDER — PANTOPRAZOLE SODIUM 40 MG/1
40 TABLET, DELAYED RELEASE ORAL DAILY
Qty: 90 TABLET | Refills: 0 | Status: SHIPPED | OUTPATIENT
Start: 2025-08-28

## 2025-08-28 RX ORDER — DOXAZOSIN 4 MG/1
4 TABLET ORAL NIGHTLY
COMMUNITY
End: 2025-08-28

## 2025-08-28 RX ORDER — DOXAZOSIN 2 MG/1
2-4 TABLET ORAL DAILY
Qty: 60 TABLET | Refills: 0 | Status: SHIPPED | OUTPATIENT
Start: 2025-08-28

## 2025-08-30 LAB — MISC. #1 REFERENCE GROUP TEST: NORMAL

## 2025-08-31 LAB — NUCLEAR IGG SER QL IA: NORMAL
